# Patient Record
Sex: MALE | Race: WHITE | NOT HISPANIC OR LATINO
[De-identification: names, ages, dates, MRNs, and addresses within clinical notes are randomized per-mention and may not be internally consistent; named-entity substitution may affect disease eponyms.]

---

## 2014-07-01 RX ORDER — LISINOPRIL 2.5 MG/1
1 TABLET ORAL
Qty: 0 | Refills: 0 | COMMUNITY
Start: 2014-07-01

## 2014-07-01 RX ORDER — CARVEDILOL PHOSPHATE 80 MG/1
1 CAPSULE, EXTENDED RELEASE ORAL
Qty: 0 | Refills: 0 | DISCHARGE
Start: 2014-07-01

## 2014-07-01 RX ORDER — CARVEDILOL PHOSPHATE 80 MG/1
1.5 CAPSULE, EXTENDED RELEASE ORAL
Qty: 0 | Refills: 0 | DISCHARGE
Start: 2014-07-01

## 2014-07-01 RX ORDER — POTASSIUM CHLORIDE 20 MEQ
1 PACKET (EA) ORAL
Qty: 0 | Refills: 0 | COMMUNITY
Start: 2014-07-01

## 2017-01-09 ENCOUNTER — APPOINTMENT (OUTPATIENT)
Dept: VASCULAR SURGERY | Facility: CLINIC | Age: 82
End: 2017-01-09

## 2017-01-09 VITALS
RESPIRATION RATE: 16 BRPM | DIASTOLIC BLOOD PRESSURE: 63 MMHG | WEIGHT: 190 LBS | BODY MASS INDEX: 25.73 KG/M2 | HEIGHT: 72 IN | TEMPERATURE: 98.1 F | HEART RATE: 71 BPM | SYSTOLIC BLOOD PRESSURE: 138 MMHG

## 2017-01-09 DIAGNOSIS — I73.9 PERIPHERAL VASCULAR DISEASE, UNSPECIFIED: ICD-10-CM

## 2017-03-28 ENCOUNTER — APPOINTMENT (OUTPATIENT)
Dept: NUCLEAR MEDICINE | Facility: IMAGING CENTER | Age: 82
End: 2017-03-28

## 2017-03-28 ENCOUNTER — APPOINTMENT (OUTPATIENT)
Dept: CT IMAGING | Facility: IMAGING CENTER | Age: 82
End: 2017-03-28

## 2017-03-28 ENCOUNTER — OUTPATIENT (OUTPATIENT)
Dept: OUTPATIENT SERVICES | Facility: HOSPITAL | Age: 82
LOS: 1 days | End: 2017-03-28
Payer: COMMERCIAL

## 2017-03-28 DIAGNOSIS — Z90.49 ACQUIRED ABSENCE OF OTHER SPECIFIED PARTS OF DIGESTIVE TRACT: Chronic | ICD-10-CM

## 2017-03-28 DIAGNOSIS — C61 MALIGNANT NEOPLASM OF PROSTATE: ICD-10-CM

## 2017-03-28 DIAGNOSIS — Z98.89 OTHER SPECIFIED POSTPROCEDURAL STATES: Chronic | ICD-10-CM

## 2017-03-28 PROCEDURE — 78306 BONE IMAGING WHOLE BODY: CPT

## 2017-03-28 PROCEDURE — A9561: CPT

## 2017-03-28 PROCEDURE — 78999 UNLISTED MISC PX DX NUC MED: CPT

## 2017-03-28 PROCEDURE — 74176 CT ABD & PELVIS W/O CONTRAST: CPT

## 2017-04-10 ENCOUNTER — APPOINTMENT (OUTPATIENT)
Dept: VASCULAR SURGERY | Facility: CLINIC | Age: 82
End: 2017-04-10

## 2017-04-17 ENCOUNTER — APPOINTMENT (OUTPATIENT)
Dept: VASCULAR SURGERY | Facility: CLINIC | Age: 82
End: 2017-04-17

## 2017-05-15 ENCOUNTER — APPOINTMENT (OUTPATIENT)
Dept: VASCULAR SURGERY | Facility: CLINIC | Age: 82
End: 2017-05-15

## 2017-05-31 ENCOUNTER — INPATIENT (INPATIENT)
Facility: HOSPITAL | Age: 82
LOS: 1 days | Discharge: HOME | DRG: 690 | End: 2017-06-02
Attending: INTERNAL MEDICINE | Admitting: HOSPITALIST
Payer: COMMERCIAL

## 2017-05-31 VITALS
HEART RATE: 64 BPM | DIASTOLIC BLOOD PRESSURE: 63 MMHG | OXYGEN SATURATION: 99 % | TEMPERATURE: 98 F | WEIGHT: 195.11 LBS | SYSTOLIC BLOOD PRESSURE: 126 MMHG | RESPIRATION RATE: 16 BRPM

## 2017-05-31 DIAGNOSIS — I25.10 ATHEROSCLEROTIC HEART DISEASE OF NATIVE CORONARY ARTERY WITHOUT ANGINA PECTORIS: ICD-10-CM

## 2017-05-31 DIAGNOSIS — N39.0 URINARY TRACT INFECTION, SITE NOT SPECIFIED: ICD-10-CM

## 2017-05-31 DIAGNOSIS — R39.9 UNSPECIFIED SYMPTOMS AND SIGNS INVOLVING THE GENITOURINARY SYSTEM: ICD-10-CM

## 2017-05-31 DIAGNOSIS — I50.9 HEART FAILURE, UNSPECIFIED: ICD-10-CM

## 2017-05-31 DIAGNOSIS — G47.33 OBSTRUCTIVE SLEEP APNEA (ADULT) (PEDIATRIC): ICD-10-CM

## 2017-05-31 DIAGNOSIS — I73.9 PERIPHERAL VASCULAR DISEASE, UNSPECIFIED: ICD-10-CM

## 2017-05-31 DIAGNOSIS — I10 ESSENTIAL (PRIMARY) HYPERTENSION: ICD-10-CM

## 2017-05-31 DIAGNOSIS — R33.9 RETENTION OF URINE, UNSPECIFIED: ICD-10-CM

## 2017-05-31 DIAGNOSIS — Z98.89 OTHER SPECIFIED POSTPROCEDURAL STATES: Chronic | ICD-10-CM

## 2017-05-31 DIAGNOSIS — Z90.49 ACQUIRED ABSENCE OF OTHER SPECIFIED PARTS OF DIGESTIVE TRACT: Chronic | ICD-10-CM

## 2017-05-31 DIAGNOSIS — J44.9 CHRONIC OBSTRUCTIVE PULMONARY DISEASE, UNSPECIFIED: ICD-10-CM

## 2017-05-31 DIAGNOSIS — N13.30 UNSPECIFIED HYDRONEPHROSIS: ICD-10-CM

## 2017-05-31 DIAGNOSIS — I48.91 UNSPECIFIED ATRIAL FIBRILLATION: ICD-10-CM

## 2017-05-31 DIAGNOSIS — Z43.3 ENCOUNTER FOR ATTENTION TO COLOSTOMY: Chronic | ICD-10-CM

## 2017-05-31 DIAGNOSIS — N17.9 ACUTE KIDNEY FAILURE, UNSPECIFIED: ICD-10-CM

## 2017-05-31 DIAGNOSIS — C61 MALIGNANT NEOPLASM OF PROSTATE: ICD-10-CM

## 2017-05-31 DIAGNOSIS — Z29.9 ENCOUNTER FOR PROPHYLACTIC MEASURES, UNSPECIFIED: ICD-10-CM

## 2017-05-31 LAB
ALBUMIN SERPL ELPH-MCNC: 3.5 G/DL — SIGNIFICANT CHANGE UP (ref 3.3–5)
ALP SERPL-CCNC: 53 U/L — SIGNIFICANT CHANGE UP (ref 40–120)
ALT FLD-CCNC: 15 U/L — SIGNIFICANT CHANGE UP (ref 12–78)
ANION GAP SERPL CALC-SCNC: 9 MMOL/L — SIGNIFICANT CHANGE UP (ref 5–17)
APPEARANCE UR: CLEAR — SIGNIFICANT CHANGE UP
APTT BLD: 41.5 SEC — HIGH (ref 27.5–37.4)
AST SERPL-CCNC: 20 U/L — SIGNIFICANT CHANGE UP (ref 15–37)
BASOPHILS # BLD AUTO: 0.1 K/UL — SIGNIFICANT CHANGE UP (ref 0–0.2)
BASOPHILS NFR BLD AUTO: 1 % — SIGNIFICANT CHANGE UP (ref 0–2)
BILIRUB SERPL-MCNC: 0.5 MG/DL — SIGNIFICANT CHANGE UP (ref 0.2–1.2)
BILIRUB UR-MCNC: NEGATIVE — SIGNIFICANT CHANGE UP
BUN SERPL-MCNC: 18 MG/DL — SIGNIFICANT CHANGE UP (ref 7–23)
CALCIUM SERPL-MCNC: 9.7 MG/DL — SIGNIFICANT CHANGE UP (ref 8.5–10.1)
CHLORIDE SERPL-SCNC: 98 MMOL/L — SIGNIFICANT CHANGE UP (ref 96–108)
CO2 SERPL-SCNC: 28 MMOL/L — SIGNIFICANT CHANGE UP (ref 22–31)
COLOR SPEC: YELLOW — SIGNIFICANT CHANGE UP
CREAT SERPL-MCNC: 1.5 MG/DL — HIGH (ref 0.5–1.3)
DIFF PNL FLD: NEGATIVE — SIGNIFICANT CHANGE UP
EOSINOPHIL # BLD AUTO: 0.7 K/UL — HIGH (ref 0–0.5)
EOSINOPHIL NFR BLD AUTO: 7.5 % — HIGH (ref 0–6)
GLUCOSE SERPL-MCNC: 106 MG/DL — HIGH (ref 70–99)
GLUCOSE UR QL: NEGATIVE — SIGNIFICANT CHANGE UP
HCT VFR BLD CALC: 36.2 % — LOW (ref 39–50)
HGB BLD-MCNC: 11.5 G/DL — LOW (ref 13–17)
INR BLD: 1.87 RATIO — HIGH (ref 0.88–1.16)
KETONES UR-MCNC: NEGATIVE — SIGNIFICANT CHANGE UP
LACTATE SERPL-SCNC: 1.5 MMOL/L — SIGNIFICANT CHANGE UP (ref 0.7–2)
LEUKOCYTE ESTERASE UR-ACNC: NEGATIVE — SIGNIFICANT CHANGE UP
LIDOCAIN IGE QN: 147 U/L — SIGNIFICANT CHANGE UP (ref 73–393)
LYMPHOCYTES # BLD AUTO: 1.4 K/UL — SIGNIFICANT CHANGE UP (ref 1–3.3)
LYMPHOCYTES # BLD AUTO: 15.9 % — SIGNIFICANT CHANGE UP (ref 13–44)
MCHC RBC-ENTMCNC: 31.2 PG — SIGNIFICANT CHANGE UP (ref 27–34)
MCHC RBC-ENTMCNC: 31.8 GM/DL — LOW (ref 32–36)
MCV RBC AUTO: 98.1 FL — SIGNIFICANT CHANGE UP (ref 80–100)
MONOCYTES # BLD AUTO: 1 K/UL — HIGH (ref 0–0.9)
MONOCYTES NFR BLD AUTO: 11.2 % — HIGH (ref 1–9)
NEUTROPHILS # BLD AUTO: 5.8 K/UL — SIGNIFICANT CHANGE UP (ref 1.8–7.4)
NEUTROPHILS NFR BLD AUTO: 64.5 % — SIGNIFICANT CHANGE UP (ref 43–77)
NITRITE UR-MCNC: NEGATIVE — SIGNIFICANT CHANGE UP
PH UR: 5 — SIGNIFICANT CHANGE UP (ref 5–8)
PLATELET # BLD AUTO: 195 K/UL — SIGNIFICANT CHANGE UP (ref 150–400)
POTASSIUM SERPL-MCNC: 4.2 MMOL/L — SIGNIFICANT CHANGE UP (ref 3.5–5.3)
POTASSIUM SERPL-SCNC: 4.2 MMOL/L — SIGNIFICANT CHANGE UP (ref 3.5–5.3)
PROT SERPL-MCNC: 7 G/DL — SIGNIFICANT CHANGE UP (ref 6–8.3)
PROT UR-MCNC: NEGATIVE — SIGNIFICANT CHANGE UP
PROTHROM AB SERPL-ACNC: 20.6 SEC — HIGH (ref 9.8–12.7)
RBC # BLD: 3.69 M/UL — LOW (ref 4.2–5.8)
RBC # FLD: 14.9 % — HIGH (ref 10.3–14.5)
SODIUM SERPL-SCNC: 135 MMOL/L — SIGNIFICANT CHANGE UP (ref 135–145)
SP GR SPEC: 1 — LOW (ref 1.01–1.02)
UROBILINOGEN FLD QL: NEGATIVE — SIGNIFICANT CHANGE UP
WBC # BLD: 9 K/UL — SIGNIFICANT CHANGE UP (ref 3.8–10.5)
WBC # FLD AUTO: 9 K/UL — SIGNIFICANT CHANGE UP (ref 3.8–10.5)

## 2017-05-31 PROCEDURE — 99223 1ST HOSP IP/OBS HIGH 75: CPT | Mod: AI,GC

## 2017-05-31 PROCEDURE — 71010: CPT | Mod: 26

## 2017-05-31 PROCEDURE — 93010 ELECTROCARDIOGRAM REPORT: CPT

## 2017-05-31 PROCEDURE — 99285 EMERGENCY DEPT VISIT HI MDM: CPT

## 2017-05-31 RX ORDER — AZTREONAM 2 G
1000 VIAL (EA) INJECTION ONCE
Qty: 0 | Refills: 0 | Status: COMPLETED | OUTPATIENT
Start: 2017-05-31 | End: 2017-05-31

## 2017-05-31 RX ORDER — ALBUTEROL 90 UG/1
2 AEROSOL, METERED ORAL EVERY 6 HOURS
Qty: 0 | Refills: 0 | Status: DISCONTINUED | OUTPATIENT
Start: 2017-05-31 | End: 2017-06-02

## 2017-05-31 RX ORDER — SODIUM CHLORIDE 9 MG/ML
1000 INJECTION INTRAMUSCULAR; INTRAVENOUS; SUBCUTANEOUS
Qty: 0 | Refills: 0 | Status: DISCONTINUED | OUTPATIENT
Start: 2017-05-31 | End: 2017-06-01

## 2017-05-31 RX ORDER — TAMSULOSIN HYDROCHLORIDE 0.4 MG/1
0.4 CAPSULE ORAL AT BEDTIME
Qty: 0 | Refills: 0 | Status: DISCONTINUED | OUTPATIENT
Start: 2017-05-31 | End: 2017-06-02

## 2017-05-31 RX ORDER — MOMETASONE FUROATE 220 UG/1
1 INHALANT RESPIRATORY (INHALATION) DAILY
Qty: 0 | Refills: 0 | Status: DISCONTINUED | OUTPATIENT
Start: 2017-05-31 | End: 2017-06-02

## 2017-05-31 RX ORDER — GABAPENTIN 400 MG/1
100 CAPSULE ORAL THREE TIMES A DAY
Qty: 0 | Refills: 0 | Status: DISCONTINUED | OUTPATIENT
Start: 2017-05-31 | End: 2017-06-02

## 2017-05-31 RX ORDER — FENOFIBRATE,MICRONIZED 130 MG
12 CAPSULE ORAL DAILY
Qty: 0 | Refills: 0 | Status: DISCONTINUED | OUTPATIENT
Start: 2017-05-31 | End: 2017-06-02

## 2017-05-31 RX ORDER — POTASSIUM CHLORIDE 20 MEQ
10 PACKET (EA) ORAL DAILY
Qty: 0 | Refills: 0 | Status: DISCONTINUED | OUTPATIENT
Start: 2017-05-31 | End: 2017-06-01

## 2017-05-31 RX ORDER — TEMAZEPAM 15 MG/1
15 CAPSULE ORAL AT BEDTIME
Qty: 0 | Refills: 0 | Status: DISCONTINUED | OUTPATIENT
Start: 2017-05-31 | End: 2017-06-02

## 2017-05-31 RX ORDER — RIVAROXABAN 15 MG-20MG
15 KIT ORAL DAILY
Qty: 0 | Refills: 0 | Status: DISCONTINUED | OUTPATIENT
Start: 2017-05-31 | End: 2017-06-02

## 2017-05-31 RX ORDER — ASPIRIN/CALCIUM CARB/MAGNESIUM 324 MG
81 TABLET ORAL DAILY
Qty: 0 | Refills: 0 | Status: DISCONTINUED | OUTPATIENT
Start: 2017-05-31 | End: 2017-06-02

## 2017-05-31 RX ORDER — ATORVASTATIN CALCIUM 80 MG/1
20 TABLET, FILM COATED ORAL AT BEDTIME
Qty: 0 | Refills: 0 | Status: DISCONTINUED | OUTPATIENT
Start: 2017-05-31 | End: 2017-06-02

## 2017-05-31 RX ORDER — PANTOPRAZOLE SODIUM 20 MG/1
40 TABLET, DELAYED RELEASE ORAL
Qty: 0 | Refills: 0 | Status: DISCONTINUED | OUTPATIENT
Start: 2017-05-31 | End: 2017-06-02

## 2017-05-31 RX ORDER — LISINOPRIL 2.5 MG/1
10 TABLET ORAL DAILY
Qty: 0 | Refills: 0 | Status: DISCONTINUED | OUTPATIENT
Start: 2017-05-31 | End: 2017-06-02

## 2017-05-31 RX ORDER — CAPSAICIN 0.025 %
1 CREAM (GRAM) TOPICAL THREE TIMES A DAY
Qty: 0 | Refills: 0 | Status: DISCONTINUED | OUTPATIENT
Start: 2017-05-31 | End: 2017-06-02

## 2017-05-31 RX ORDER — TRAMADOL HYDROCHLORIDE 50 MG/1
50 TABLET ORAL DAILY
Qty: 0 | Refills: 0 | Status: DISCONTINUED | OUTPATIENT
Start: 2017-05-31 | End: 2017-06-02

## 2017-05-31 RX ORDER — CARVEDILOL PHOSPHATE 80 MG/1
12.5 CAPSULE, EXTENDED RELEASE ORAL EVERY 12 HOURS
Qty: 0 | Refills: 0 | Status: DISCONTINUED | OUTPATIENT
Start: 2017-05-31 | End: 2017-06-02

## 2017-05-31 RX ORDER — ISOSORBIDE MONONITRATE 60 MG/1
60 TABLET, EXTENDED RELEASE ORAL DAILY
Qty: 0 | Refills: 0 | Status: DISCONTINUED | OUTPATIENT
Start: 2017-05-31 | End: 2017-06-02

## 2017-05-31 RX ADMIN — TRAMADOL HYDROCHLORIDE 50 MILLIGRAM(S): 50 TABLET ORAL at 23:20

## 2017-05-31 RX ADMIN — ATORVASTATIN CALCIUM 20 MILLIGRAM(S): 80 TABLET, FILM COATED ORAL at 22:44

## 2017-05-31 RX ADMIN — GABAPENTIN 100 MILLIGRAM(S): 400 CAPSULE ORAL at 22:44

## 2017-05-31 RX ADMIN — TAMSULOSIN HYDROCHLORIDE 0.4 MILLIGRAM(S): 0.4 CAPSULE ORAL at 22:44

## 2017-05-31 RX ADMIN — SODIUM CHLORIDE 50 MILLILITER(S): 9 INJECTION INTRAMUSCULAR; INTRAVENOUS; SUBCUTANEOUS at 21:00

## 2017-05-31 RX ADMIN — Medication 50 MILLIGRAM(S): at 14:32

## 2017-05-31 RX ADMIN — TRAMADOL HYDROCHLORIDE 50 MILLIGRAM(S): 50 TABLET ORAL at 22:44

## 2017-05-31 RX ADMIN — CARVEDILOL PHOSPHATE 12.5 MILLIGRAM(S): 80 CAPSULE, EXTENDED RELEASE ORAL at 18:11

## 2017-05-31 NOTE — ED PROVIDER NOTE - PMH
Acute MI    Afib    Afib    Anemia, vitamin B12 deficiency    Automatic implantable cardioverter-defibrillator in situ    CAD (coronary artery disease)    CKD (chronic kidney disease)    Colon cancer    Colon cancer    Congestive heart failure    COPD (chronic obstructive pulmonary disease)    HTN (hypertension)    HTN (hypertension)    Hypertension    Insomnia    Ischemic colitis, enteritis, or enterocolitis    MI (myocardial infarction)    PAD (peripheral artery disease)    Peripheral Neuropathy    Prostate cancer    Vitamin B 12 deficiency

## 2017-05-31 NOTE — CONSULT NOTE ADULT - PROBLEM SELECTOR RECOMMENDATION 3
on self catheterization 4x/day.  Neville placed due to hydro and difficulty for patient to perform self cath in the hospital

## 2017-05-31 NOTE — H&P ADULT - PROBLEM SELECTOR PLAN 9
S/P radiation.   Continue with daily self-catheterization S/P radiation.   Continue with daily self-catheterization  Continue Tamsulosin

## 2017-05-31 NOTE — H&P ADULT - NSHPREVIEWOFSYSTEMS_GEN_ALL_CORE
REVIEW OF SYSTEMS:  CONSTITUTIONAL: No fever, weight loss, or fatigue  EYES: No eye pain, visual disturbances, or discharge  ENMT:  No difficulty hearing, tinnitus, vertigo; No sinus or throat pain  NECK: No pain or stiffness  RESPIRATORY: No cough, wheezing, chills or hemoptysis; No shortness of breath  CARDIOVASCULAR: No chest pain, palpitations, dizziness, or leg swelling  GASTROINTESTINAL: No abdominal or epigastric pain. No nausea, vomiting, or hematemesis; No diarrhea or constipation. No melena or hematochezia.  GENITOURINARY: No frequency, hematuria. Admits to dysuria and urinary retention  NEUROLOGICAL: No headaches, memory loss, loss of strength, numbness, or tremors  SKIN: No itching, burning, rashes, or lesions   LYMPH NODES: No enlarged glands  ENDOCRINE: No heat or cold intolerance; No hair loss  MUSCULOSKELETAL: No joint pain or swelling; chronic LE neuropathy  PSYCHIATRIC: No depression, anxiety, mood swings, or difficulty sleeping  HEME/LYMPH: No easy bruising, or bleeding gums  ALLERGY AND IMMUNOLOGIC: No hives or eczema

## 2017-05-31 NOTE — H&P ADULT - HISTORY OF PRESENT ILLNESS
82 yo M with PMHx of recurrent UTIs, CHF, MI s/p CABG, prostate CA s/p radiation, colon CA s/p chemo and resection with colostomy, HTN, HLD, A-fib on Xarelto, TIA, PVD, AICD presented to the ED with MDR UTI with culture performed outpatient with Dr. Remy. The patient has a history of recurrent UTIs and follows with urology, Dr. Remy. The patient had taken a course of Keflex and Cipro a few weeks ago. Yesterday, the patient was prescribed Bactrim, but he did not take any of the medication. Patient admits to burning with urination in the morning. Denies increased urinary frequency, gross hematuria, abdominal pain, N/V/D/C, fever, chills. The patient self catheterizes 4 times per day. 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 Remy. The patient has a history of recurrent UTIs and follows with urology, Dr. Remy. The patient had taken a course of Keflex and Cipro a few weeks ago. Yesterday, the patient was prescribed Bactrim, but he did not take any of the medication. Patient admits to burning with urination in the morning. Denies increased urinary frequency, gross hematuria, abdominal pain, N/V/D/C, fever, chills. The patient self catheterizes 4 times per day s/p prostate cancer tx.      In the ED, T 98.1, HR 64, /63, RR 16, SPO2 99% on RA. WBC 9, Hgb 11.5, Hct 36.2, platelets 195, PT 20.6, INR 1.87, PTT 41.5, Cr 1.5, Glucose 106, lactate 1.5, lipase 147, UA: neg  CXR: Cardiomegaly. No active infiltrates. Small right pleural effusion. 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 Remy. The patient has a history of recurrent UTIs and follows with urology, Dr. Remy. The patient had taken a course of Keflex and Cipro a few weeks ago. Yesterday, the patient was prescribed Bactrim, but he did not take any of the medication. Patient admits to burning with urination in the morning. Denies increased urinary frequency, gross hematuria, abdominal pain, N/V/D/C, fever, chills. The patient self catheterizes 4 times per day s/p prostate cancer tx.      In the ED, T 98.1, HR 64, /63, RR 16, SPO2 99% on RA. WBC 9, Hgb 11.5, Hct 36.2, platelets 195, PT 20.6, INR 1.87, PTT 41.5, Cr 1.5, Glucose 106, lactate 1.5, lipase 147, UA: neg  CXR: Cardiomegaly. No active infiltrates. Small right pleural effusion.  Outpatient UCx: Pseudomonas aeruginosa >100,000 cfu/ml, Elizabethkingia meningoseptica >100,000 cfu/ml

## 2017-05-31 NOTE — ED ADULT NURSE REASSESSMENT NOTE - NS ED NURSE REASSESS COMMENT FT1
Received patient from Noemi PERSON. Patient alert and oriented. Vital signs as documented. Patient denies pain. Awaiting to give report to floor and transport. Safety maintained.

## 2017-05-31 NOTE — ED ADULT NURSE REASSESSMENT NOTE - NS ED NURSE REASSESS COMMENT FT1
Pt states he has COPD and is oxygen dependent at home at hour of sleep. Call placed to resident to obtain orders for oxygen.  Spo2 here in ER 98% on RA.

## 2017-05-31 NOTE — H&P ADULT - NSHPOUTPATIENTPROVIDERS_GEN_ALL_CORE
PMD: Dr. Del Castillo  Urology: Dr. Remy  Cardiology: Dr. Moore  Podiatrist: Dr. Sumner  Vascular: Dr. Rojas

## 2017-05-31 NOTE — H&P ADULT - PROBLEM SELECTOR PLAN 4
S/P AICD  Continue home meds: torsemide and lisinopril with hold parameters  Strict I+Os  Daily weights

## 2017-05-31 NOTE — PROCEDURE NOTE - NSURITECHNIQUE_GU_A_CORE
The collection bag is below the level of the patient and urinary bladder/Sterile gloves were worn for the duration of the procedure/The urinary drainage system is closed at the end of the procedure/The site was cleaned with soap/water and sterile solution (betadine)/The catheter was secured with a securement device (e.g. StatLock)/All applicable medical record documentation is completed/A sterile drape was used to cover all adjacent areas/Proper hand hygiene was performed

## 2017-05-31 NOTE — H&P ADULT - FAMILY HISTORY
Father  Still living? No  Family history of MI (myocardial infarction), Age at diagnosis: Age Unknown  Family history of colon cancer, Age at diagnosis: Age Unknown

## 2017-05-31 NOTE — ED PROVIDER NOTE - OBJECTIVE STATEMENT
82 yo male c/o frequency/dysuria x 2 weeks, has been on cipro x 1 week and bactrim x 1 week.  No fever/chills.  No back pain.  Sent from [facility] center Dr. Remy (PMD) for +urine cultures showing multi drug resistance (>100K Pseudomonas and >100K Elizabethkingia meningoseptica).  Sent here for IV abx, PICC line/admission.

## 2017-05-31 NOTE — H&P ADULT - NSHPSOCIALHISTORY_GEN_ALL_CORE
Social History:    Marital Status:  ( X )    (   ) Single    (   )    (  )   Lives with: (  ) alone  (  ) children   ( X ) spouse   (  ) parents  (  ) other    Substance Use (street drugs): ( X ) never used  (  ) other:  Tobacco Usage:  ( X  ) never smoked   (   ) former smoker   (   ) current smoker; quit 50 years ago, smoked 1 ppd/15 years    Health Management  Ambulates with walker and cane.    Immunization Hx:   ( X ) flu shot                               (     ) date   ( X ) pneumonia shot               (     ) date  ( X ) tetanus                               (     ) date

## 2017-05-31 NOTE — H&P ADULT - ATTENDING COMMENTS
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 Remy. Plan as above discussed at length.

## 2017-05-31 NOTE — H&P ADULT - RS GEN PE MLT RESP DETAILS PC
no rhonchi/no rales/clear to auscultation bilaterally/breath sounds equal/good air movement/no chest wall tenderness/airway patent/respirations non-labored/no wheezes

## 2017-05-31 NOTE — PROCEDURE NOTE - NSSITEPREP_SKIN_A_CORE
Adherence to aseptic technique: hand hygiene prior to donning barriers (gown, gloves), don cap and mask, sterile drape over patient/povidone iodine (if allergic to chlorhexidine)

## 2017-05-31 NOTE — ED ADULT NURSE NOTE - PMH
Acute MI    Afib    Afib    Anemia, vitamin B12 deficiency    Automatic implantable cardioverter-defibrillator in situ    CAD (coronary artery disease)    CKD (chronic kidney disease)    Colon cancer    Colon cancer    Congestive heart failure    COPD (chronic obstructive pulmonary disease)    HTN (hypertension)    HTN (hypertension)    Hypertension    Insomnia    Ischemic colitis, enteritis, or enterocolitis    MI (myocardial infarction)    PAD (peripheral artery disease)    Peripheral Neuropathy    Prostate cancer    Vitamin B 12 deficiency Acute MI    Afib    Afib    Anemia, vitamin B12 deficiency    Automatic implantable cardioverter-defibrillator in situ    CAD (coronary artery disease)    CKD (chronic kidney disease)    Colon cancer    Colon cancer    Congestive heart failure    COPD (chronic obstructive pulmonary disease)    HTN (hypertension)    HTN (hypertension)    Hypertension    Insomnia    Ischemic bowel disease    Ischemic colitis, enteritis, or enterocolitis    MI (myocardial infarction)    PAD (peripheral artery disease)    Peripheral Neuropathy    Prostate cancer    Vitamin B 12 deficiency Acute MI    Afib    Anemia, vitamin B12 deficiency    Automatic implantable cardioverter-defibrillator in situ    CAD (coronary artery disease)    CKD (chronic kidney disease)    Colon cancer    Congestive heart failure    COPD (chronic obstructive pulmonary disease)    COPD (chronic obstructive pulmonary disease)    HTN (hypertension)    Hypertension    Insomnia    Ischemic bowel disease    Ischemic colitis, enteritis, or enterocolitis    MI (myocardial infarction)    Oxygen dependent  wears 2LNC at HS  PAD (peripheral artery disease)    Peripheral Neuropathy    Prostate cancer    Vitamin B 12 deficiency

## 2017-05-31 NOTE — H&P ADULT - PROBLEM SELECTOR PLAN 1
Admit to [facility]  Consult ID- Dr. Parrish  Consult urology- Dr. Everett  Start IV antibiotics as per ID  Continue urinary cath 4x/day  Continue Flomax  Monitor vitals and AM labs  Follow up repeat BCx, UA/UCx and sensitivity Admit to [facility]  Outpatient UCx: Pseudomonas aeruginosa >100,000 cfu/ml, Elizabethkingia meningoseptica >100,000 cfu/ml  Consult ID- Dr. Parrish  Consult urology- Dr. Everett  Start IV antibiotics as per ID  Continue urinary cath 4x/day  Continue Flomax  Monitor vitals and AM labs  Follow up repeat BCx, UA/UCx and sensitivity

## 2017-05-31 NOTE — ED ADULT TRIAGE NOTE - CHIEF COMPLAINT QUOTE
c/o persistent burning feeling during urination. s/e by Urologist diagnosed with UTI with positive urine culture.

## 2017-05-31 NOTE — H&P ADULT - ASSESSMENT
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 Remy. 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 Remy.

## 2017-05-31 NOTE — H&P ADULT - PMH
Acute MI    Afib    Anemia, vitamin B12 deficiency    Automatic implantable cardioverter-defibrillator in situ    CAD (coronary artery disease)    CKD (chronic kidney disease)    Colon cancer    Congestive heart failure    COPD (chronic obstructive pulmonary disease)    HTN (hypertension)    Hypertension    Insomnia    Ischemic bowel disease    Ischemic colitis, enteritis, or enterocolitis    MI (myocardial infarction)    PAD (peripheral artery disease)    Peripheral Neuropathy    Prostate cancer    Vitamin B 12 deficiency

## 2017-05-31 NOTE — ED ADULT NURSE NOTE - OBJECTIVE STATEMENT
Presents to ER w c/o burning urination.  Pt states he was on Cipro a couple of weeks ago for a UTI and states "antibiotics didn't work so my doctor put me on Bactrim yesterday, but I didn't take it b/c I just figured i'd come to the hospital today" Pt report straight cathing himself 4x's per day.  Stage 1 noted to sacrum.

## 2017-05-31 NOTE — ED PROVIDER NOTE - PSH
Cataract extraction status of eye, right    S/P amputation  1-3 digits of Right foot  S/P bypass graft of extremity  Left to right femoral-femoral artery bypass graft 10 years ago  S/P CABG x 3    S/P cardiac pacemaker procedure  AICD  S/P cholecystectomy    S/P cholecystectomy    S/P colon resection    S/P colon resection    S/P colon resection    S/P colostomy    S/P colostomy    S/P small bowel resection

## 2017-05-31 NOTE — H&P ADULT - PSH
Cataract extraction status of eye, right    Colostomy care    S/P amputation  1-3 digits of Right foot  S/P bypass graft of extremity  Left to right femoral-femoral artery bypass graft 10 years ago  S/P CABG x 3    S/P cardiac pacemaker procedure  AICD  S/P cholecystectomy    S/P colon resection    S/P colostomy    S/P small bowel resection

## 2017-06-01 LAB
ALBUMIN SERPL ELPH-MCNC: 3.2 G/DL — LOW (ref 3.3–5)
ALP SERPL-CCNC: 48 U/L — SIGNIFICANT CHANGE UP (ref 40–120)
ALT FLD-CCNC: 11 U/L — LOW (ref 12–78)
ANION GAP SERPL CALC-SCNC: 7 MMOL/L — SIGNIFICANT CHANGE UP (ref 5–17)
AST SERPL-CCNC: 16 U/L — SIGNIFICANT CHANGE UP (ref 15–37)
BASOPHILS # BLD AUTO: 0.1 K/UL — SIGNIFICANT CHANGE UP (ref 0–0.2)
BASOPHILS NFR BLD AUTO: 0.8 % — SIGNIFICANT CHANGE UP (ref 0–2)
BILIRUB SERPL-MCNC: 0.7 MG/DL — SIGNIFICANT CHANGE UP (ref 0.2–1.2)
BUN SERPL-MCNC: 17 MG/DL — SIGNIFICANT CHANGE UP (ref 7–23)
CALCIUM SERPL-MCNC: 9.6 MG/DL — SIGNIFICANT CHANGE UP (ref 8.5–10.1)
CHLORIDE SERPL-SCNC: 100 MMOL/L — SIGNIFICANT CHANGE UP (ref 96–108)
CO2 SERPL-SCNC: 30 MMOL/L — SIGNIFICANT CHANGE UP (ref 22–31)
CREAT SERPL-MCNC: 1.4 MG/DL — HIGH (ref 0.5–1.3)
CULTURE RESULTS: NO GROWTH — SIGNIFICANT CHANGE UP
EOSINOPHIL # BLD AUTO: 0.6 K/UL — HIGH (ref 0–0.5)
EOSINOPHIL NFR BLD AUTO: 6.9 % — HIGH (ref 0–6)
GLUCOSE SERPL-MCNC: 83 MG/DL — SIGNIFICANT CHANGE UP (ref 70–99)
HCT VFR BLD CALC: 34.4 % — LOW (ref 39–50)
HGB BLD-MCNC: 10.8 G/DL — LOW (ref 13–17)
LYMPHOCYTES # BLD AUTO: 1.4 K/UL — SIGNIFICANT CHANGE UP (ref 1–3.3)
LYMPHOCYTES # BLD AUTO: 15.1 % — SIGNIFICANT CHANGE UP (ref 13–44)
MCHC RBC-ENTMCNC: 30.7 PG — SIGNIFICANT CHANGE UP (ref 27–34)
MCHC RBC-ENTMCNC: 31.4 GM/DL — LOW (ref 32–36)
MCV RBC AUTO: 97.7 FL — SIGNIFICANT CHANGE UP (ref 80–100)
MONOCYTES # BLD AUTO: 1.2 K/UL — HIGH (ref 0–0.9)
MONOCYTES NFR BLD AUTO: 12.9 % — HIGH (ref 1–9)
NEUTROPHILS # BLD AUTO: 5.9 K/UL — SIGNIFICANT CHANGE UP (ref 1.8–7.4)
NEUTROPHILS NFR BLD AUTO: 64.3 % — SIGNIFICANT CHANGE UP (ref 43–77)
PLATELET # BLD AUTO: 194 K/UL — SIGNIFICANT CHANGE UP (ref 150–400)
POTASSIUM SERPL-MCNC: 4.4 MMOL/L — SIGNIFICANT CHANGE UP (ref 3.5–5.3)
POTASSIUM SERPL-SCNC: 4.4 MMOL/L — SIGNIFICANT CHANGE UP (ref 3.5–5.3)
PROT SERPL-MCNC: 6.4 G/DL — SIGNIFICANT CHANGE UP (ref 6–8.3)
RBC # BLD: 3.52 M/UL — LOW (ref 4.2–5.8)
RBC # FLD: 14.9 % — HIGH (ref 10.3–14.5)
SODIUM SERPL-SCNC: 137 MMOL/L — SIGNIFICANT CHANGE UP (ref 135–145)
SPECIMEN SOURCE: SIGNIFICANT CHANGE UP
WBC # BLD: 9.2 K/UL — SIGNIFICANT CHANGE UP (ref 3.8–10.5)
WBC # FLD AUTO: 9.2 K/UL — SIGNIFICANT CHANGE UP (ref 3.8–10.5)

## 2017-06-01 PROCEDURE — 99233 SBSQ HOSP IP/OBS HIGH 50: CPT | Mod: GC

## 2017-06-01 RX ORDER — ACETAMINOPHEN 500 MG
650 TABLET ORAL EVERY 6 HOURS
Qty: 0 | Refills: 0 | Status: DISCONTINUED | OUTPATIENT
Start: 2017-06-01 | End: 2017-06-02

## 2017-06-01 RX ORDER — GLYCERIN 1 %
1 DROPS OPHTHALMIC (EYE)
Qty: 0 | Refills: 0 | Status: DISCONTINUED | OUTPATIENT
Start: 2017-06-01 | End: 2017-06-01

## 2017-06-01 RX ADMIN — ISOSORBIDE MONONITRATE 60 MILLIGRAM(S): 60 TABLET, EXTENDED RELEASE ORAL at 13:25

## 2017-06-01 RX ADMIN — Medication 81 MILLIGRAM(S): at 13:25

## 2017-06-01 RX ADMIN — Medication 12 MILLIGRAM(S): at 13:25

## 2017-06-01 RX ADMIN — ATORVASTATIN CALCIUM 20 MILLIGRAM(S): 80 TABLET, FILM COATED ORAL at 22:02

## 2017-06-01 RX ADMIN — Medication 1 DROP(S): at 18:00

## 2017-06-01 RX ADMIN — PANTOPRAZOLE SODIUM 40 MILLIGRAM(S): 20 TABLET, DELAYED RELEASE ORAL at 06:36

## 2017-06-01 RX ADMIN — TAMSULOSIN HYDROCHLORIDE 0.4 MILLIGRAM(S): 0.4 CAPSULE ORAL at 22:02

## 2017-06-01 RX ADMIN — LISINOPRIL 10 MILLIGRAM(S): 2.5 TABLET ORAL at 06:36

## 2017-06-01 RX ADMIN — Medication 650 MILLIGRAM(S): at 10:54

## 2017-06-01 RX ADMIN — Medication 10 MILLIEQUIVALENT(S): at 13:24

## 2017-06-01 RX ADMIN — CARVEDILOL PHOSPHATE 12.5 MILLIGRAM(S): 80 CAPSULE, EXTENDED RELEASE ORAL at 19:52

## 2017-06-01 RX ADMIN — Medication 650 MILLIGRAM(S): at 11:00

## 2017-06-01 RX ADMIN — MOMETASONE FUROATE 1 PUFF(S): 220 INHALANT RESPIRATORY (INHALATION) at 18:00

## 2017-06-01 RX ADMIN — CARVEDILOL PHOSPHATE 12.5 MILLIGRAM(S): 80 CAPSULE, EXTENDED RELEASE ORAL at 06:36

## 2017-06-01 RX ADMIN — TEMAZEPAM 15 MILLIGRAM(S): 15 CAPSULE ORAL at 22:08

## 2017-06-01 RX ADMIN — GABAPENTIN 100 MILLIGRAM(S): 400 CAPSULE ORAL at 22:02

## 2017-06-01 RX ADMIN — GABAPENTIN 100 MILLIGRAM(S): 400 CAPSULE ORAL at 13:25

## 2017-06-01 RX ADMIN — RIVAROXABAN 15 MILLIGRAM(S): KIT at 19:52

## 2017-06-01 RX ADMIN — TRAMADOL HYDROCHLORIDE 50 MILLIGRAM(S): 50 TABLET ORAL at 22:02

## 2017-06-01 RX ADMIN — GABAPENTIN 100 MILLIGRAM(S): 400 CAPSULE ORAL at 06:36

## 2017-06-01 NOTE — PROGRESS NOTE ADULT - PROBLEM SELECTOR PLAN 2
likely CKD, creatinine slightly improved to 1.4 today  Start IVF hydration, NS 50 cc/hr for 12 hrs likely CKD, creatinine slightly improved to 1.4 today which appears to be the baseline based on records  hold off on further IVF

## 2017-06-01 NOTE — PROGRESS NOTE ADULT - SUBJECTIVE AND OBJECTIVE BOX
PAST MEDICAL & SURGICAL HISTORY:  Oxygen dependent: wears 2LNC at HS  COPD (chronic obstructive pulmonary disease)  Ischemic bowel disease  Colon cancer  Automatic implantable cardioverter-defibrillator in situ  Acute MI  Afib  Hypertension  Congestive heart failure  Insomnia  Anemia, vitamin B12 deficiency  CAD (coronary artery disease)  HTN (hypertension)  Ischemic colitis, enteritis, or enterocolitis  Vitamin B 12 deficiency  Prostate cancer  Colon cancer  Afib  CKD (chronic kidney disease)  MI (myocardial infarction)  PAD (peripheral artery disease)  CAD (coronary artery disease)  Peripheral Neuropathy  HTN (hypertension)  COPD (chronic obstructive pulmonary disease)  Colostomy care  Cataract extraction status of eye, right  S/P colostomy  S/P colon resection  S/P cholecystectomy  S/P colostomy  S/P colon resection  S/P cardiac pacemaker procedure: AICD  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  S/P small bowel resection  S/P colon resection  S/P cholecystectomy  S/P bypass graft of extremity: Left to right femoral-femoral artery bypass graft 10 years ago  S/P CABG x 3      REVIEW OF SYSTEMS:    MEDICATIONS  (STANDING):  aspirin enteric coated 81milliGRAM(s) Oral daily  traMADol 50milliGRAM(s) Oral daily  lisinopril 10milliGRAM(s) Oral daily  tamsulosin 0.4milliGRAM(s) Oral at bedtime  isosorbide   mononitrate ER Tablet (IMDUR) 60milliGRAM(s) Oral daily  rivaroxaban 15milliGRAM(s) Oral daily  gabapentin 100milliGRAM(s) Oral three times a day  atorvastatin 20milliGRAM(s) Oral at bedtime  fenofibrate Tablet 12milliGRAM(s) Oral daily  carvedilol 12.5milliGRAM(s) Oral every 12 hours  pantoprazole    Tablet 40milliGRAM(s) Oral before breakfast  mometasone 220 MICROgram(s) Inhaler 1Puff(s) Inhalation daily  artificial  tears Solution 1Drop(s) Both EYES every 12 hours  torsemide 20milliGRAM(s) Oral daily    MEDICATIONS  (PRN):  temazepam 15milliGRAM(s) Oral at bedtime PRN Insomnia  capsaicin 0.025% Cream 1Application(s) Topical three times a day PRN Pain  ALBUTerol    90 MICROgram(s) HFA Inhaler 2Puff(s) Inhalation every 6 hours PRN Shortness of Breath and/or Wheezing  acetaminophen   Tablet. 650milliGRAM(s) Oral every 6 hours PRN Mild Pain (1 - 3)      PE: Neville per urethra, urine grossly clear    Allergies    No Known Allergies    Intolerances        FAMILY HISTORY:  Family history of colon cancer (Father)  Family history of MI (myocardial infarction) (Father)  No pertinent family history in first degree relatives      Vital Signs Last 24 Hrs  T(C): 36.8, Max: 36.9 ( @ 19:30)  T(F): 98.2, Max: 98.4 ( @ 19:30)  HR: 59 (59 - 72)  BP: 111/65 (111/65 - 148/80)  BP(mean): --  RR: 59 (15 - 59)  SpO2: 95% (95% - 99%)    PHYSICAL EXAM:    LABS:                        10.8   9.2   )-----------( 194      ( 2017 09:00 )             34.4     06-01    137  |  100  |  17  ----------------------------<  83  4.4   |  30  |  1.40<H>    Ca    9.6      2017 09:00    TPro  6.4  /  Alb  3.2<L>  /  TBili  0.7  /  DBili  x   /  AST  16  /  ALT  11<L>  /  AlkPhos  48  06-01    PT/INR - ( 31 May 2017 13:29 )   PT: 20.6 sec;   INR: 1.87 ratio         PTT - ( 31 May 2017 13:29 )  PTT:41.5 sec  Urinalysis Basic - ( 31 May 2017 14:32 )    Color: Yellow / Appearance: Clear / S.005 / pH: x  Gluc: x / Ketone: Negative  / Bili: Negative / Urobili: Negative   Blood: x / Protein: Negative / Nitrite: Negative   Leuk Esterase: Negative / RBC: x / WBC x   Sq Epi: x / Non Sq Epi: x / Bacteria: x      Urine Culture:     RADIOLOGY & ADDITIONAL STUDIES:

## 2017-06-01 NOTE — DISCHARGE NOTE ADULT - PATIENT PORTAL LINK FT
“You can access the [facility] Patient Portal, offered by [facility], by registering with the following website: [email or web address]”

## 2017-06-01 NOTE — PROGRESS NOTE ADULT - PROBLEM SELECTOR PLAN 7
Continue lisinopril, carvedilol, torsemide and KCl  Monitor vitals Continue lisinopril, carvedilol, torsemide wit hold parameters   Monitor vitals

## 2017-06-01 NOTE — DISCHARGE NOTE ADULT - HOSPITAL COURSE
84 yo M with PMHx of recurrent UTIs, CHF (LVEF 30% in Aug 2016), MI s/p CABG, AICD, A-fib on Xarelto,  prostate CA s/p radiation (2004), colon CA s/p chemo (2004) and resection with colostomy, HTN, HLD, TIA, PVD s/p right foot amputation of toes 1-3, COPD and left hip fx presented to the ED with MDR UTI with culture performed outpatient with Dr. Remy. The patient has a history of recurrent UTIs and follows with urology, Dr. Remy. The patient had taken a course of Keflex and Cipro a few weeks prior to admission. Day before admission, the patient was prescribed Bactrim, but he did not take any of the medication. Patient admitted to burning with urination in the morning. Denied increased urinary frequency, gross hematuria, abdominal pain, N/V/D/C, fever, chills. The patient self catheterizes 4 times per day s/p prostate cancer tx.      In the ED, T 98.1, HR 64, /63, RR 16, SPO2 99% on RA. WBC 9, Hgb 11.5, Hct 36.2, platelets 195, PT 20.6, INR 1.87, PTT 41.5, Cr 1.5, Glucose 106, lactate 1.5, lipase 147, UA: neg. CXR: Cardiomegaly. No active infiltrates. Small right pleural effusion. Outpatient UCx: Pseudomonas aeruginosa >100,000 cfu/ml, Elizabethkingia meningoseptica >100,000 cfu/ml.    Admitted to general medical floor with UTI, positive outpatient blood cultures, continued 82 yo M with PMHx of recurrent UTIs, CHF (LVEF 30% in Aug 2016), MI s/p CABG, AICD, A-fib on Xarelto,  prostate CA s/p radiation (2004), colon CA s/p chemo (2004) and resection with colostomy, HTN, HLD, TIA, PVD s/p right foot amputation of toes 1-3, COPD and left hip fx presented to the ED with MDR UTI with culture performed outpatient with Dr. Remy. The patient has a history of recurrent UTIs and follows with urology, Dr. Remy. The patient had taken a course of Keflex and Cipro a few weeks prior to admission. Day before admission, the patient was prescribed Bactrim, but he did not take any of the medication. Patient admitted to burning with urination in the morning. Denied increased urinary frequency, gross hematuria, abdominal pain, N/V/D/C, fever, chills. The patient self catheterizes 4 times per day s/p prostate cancer tx.      In the ED, T 98.1, HR 64, /63, RR 16, SPO2 99% on RA. WBC 9, Hgb 11.5, Hct 36.2, platelets 195, PT 20.6, INR 1.87, PTT 41.5, Cr 1.5, Glucose 106, lactate 1.5, lipase 147, UA: neg. CXR: Cardiomegaly. No active infiltrates. Small right pleural effusion. Outpatient UCx: Pseudomonas aeruginosa >100,000 cfu/ml, Elizabethkingia meningoseptica >100,000 cfu/ml.    Admitted to general medical floor with UTI, positive outpatient blood cultures, given one dose of azactam in ED. Evaluated by urology (Dr Be) who inserted alvarado catheter in ED. Per  and ID, likely not acute UTI, antibiotics not continued. Renal ultrasound done showing.... 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 Remy. The patient has a history of recurrent UTIs and follows with urology, Dr. Remy. The patient had taken a course of Keflex and Cipro a few weeks prior to admission. Day before admission, the patient was prescribed Bactrim, but he did not take any of the medication. Patient admitted to burning with urination in the morning. Denied increased urinary frequency, gross hematuria, abdominal pain, N/V/D/C, fever, chills. The patient self catheterizes 4 times per day s/p prostate cancer tx.      In the ED, T 98.1, HR 64, /63, RR 16, SPO2 99% on RA. WBC 9, Hgb 11.5, Hct 36.2, platelets 195, PT 20.6, INR 1.87, PTT 41.5, Cr 1.5, Glucose 106, lactate 1.5, lipase 147, UA: neg. CXR: Cardiomegaly. No active infiltrates. Small right pleural effusion. Outpatient UCx: Pseudomonas aeruginosa >100,000 cfu/ml, Elizabethkingia meningoseptica >100,000 cfu/ml.    Admitted to general medical floor with UTI, positive outpatient blood cultures, given one dose of azactam in ED. Evaluated by urology (Dr Be) who inserted alvarado catheter in ED. Per  and ID, likely not acute UTI, antibiotics not continued. Left eye erythematous with some blood pooling, patient states this happens to him intermittently at home and he suffers from dry eyes. He was given wetting eye drops, no acute changes in vision or extraocular movements. Renal ultrasound done showing no hydronephrosis. Per patient, he is at his baseline creatinine (ranges 1.4-1.6). Alvarado discontinued, patient may continue CIC at home. Patient stable for discharge home, follow up with urology (Dr Remy) in 2 weeks and PCP (Dr Del Castillo) upon discharge. 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 Remy. The patient has a history of recurrent UTIs and follows with urology, Dr. Remy. The patient had taken a course of Keflex and Cipro a few weeks prior to admission. Day before admission, the patient was prescribed Bactrim, but he did not take any of the medication. Patient admitted to burning with urination in the morning. Denied increased urinary frequency, gross hematuria, abdominal pain, N/V/D/C, fever, chills. The patient self catheterizes 4 times per day s/p prostate cancer tx.      In the ED, T 98.1, HR 64, /63, RR 16, SPO2 99% on RA. WBC 9, Hgb 11.5, Hct 36.2, platelets 195, PT 20.6, INR 1.87, PTT 41.5, Cr 1.5, Glucose 106, lactate 1.5, lipase 147, UA: neg. CXR: Cardiomegaly. No active infiltrates. Small right pleural effusion. Outpatient UCx: Pseudomonas aeruginosa >100,000 cfu/ml, Elizabethkingia meningoseptica >100,000 cfu/ml.    Admitted to general medical floor with UTI, positive outpatient blood cultures, given one dose of azactam in ED. Evaluated by urology (Dr Be) who inserted alvarado catheter in ED. Per  and ID, likely not acute UTI, antibiotics not continued. Left eye erythematous with some blood pooling, patient states this happens to him intermittently at home and he suffers from dry eyes. He was given wetting eye drops, no acute changes in vision or extraocular movements. Renal ultrasound done showing no hydronephrosis. Per patient, he is at his baseline creatinine (ranges 1.4-1.6). Alvarado discontinued, patient may continue CIC at home. Patient stable for discharge home, follow up with urology (Dr Remy) in 2 weeks and PCP (Dr Del Castillo) upon discharge.    PE on day of discharge:  Vitals- T: 36.4, HR: 60, BP: 121/63, RR: 17, SpO2: 97% on RA  Gen- no acute distress, resting comfortably in bed  HEENT- left eye erythematous with slight blood pooling, pupils reactive, EOMI, no surrounding tenderness  Neuro- awake alert and oriented x3, CN II-XII intact  Cardio- regular rate and rhythm, (+) S1/S2  Resp- clear to auscultation bilaterally, no wheezes/rales/rhonchi  Abd- soft, non tender, non distended, colostomy present  - alvarado in place draining clear yellow urine  Ext- no cyanosis, clubbing, edema 82 yo M with PMHx of recurrent UTIs, CHF (LVEF 30% in Aug 2016), MI s/p CABG, AICD, A-fib on Xarelto,  prostate CA s/p radiation (2004), colon CA s/p chemo (2004) and resection with colostomy, HTN, HLD, TIA, PVD s/p right foot amputation of toes 1-3, COPD and left hip fx presented to the ED with MDR UTI with culture performed outpatient with Dr. Remy. The patient has a history of recurrent UTIs and follows with urology, Dr. Remy. The patient had taken a course of Keflex and Cipro a few weeks prior to admission. Day before admission, the patient was prescribed Bactrim, but he did not take any of the medication. Patient admitted to burning with urination in the morning. Denied increased urinary frequency, gross hematuria, abdominal pain, N/V/D/C, fever, chills. The patient self catheterizes 4 times per day s/p prostate cancer tx.      In the ED, T 98.1, HR 64, /63, RR 16, SPO2 99% on RA. WBC 9, Hgb 11.5, Hct 36.2, platelets 195, PT 20.6, INR 1.87, PTT 41.5, Cr 1.5, Glucose 106, lactate 1.5, lipase 147, UA: neg. CXR: Cardiomegaly. No active infiltrates. Small right pleural effusion. Outpatient UCx: Pseudomonas aeruginosa >100,000 cfu/ml, Elizabethkingia meningoseptica >100,000 cfu/ml.    Admitted to general medical floor with UTI, positive outpatient blood cultures, given one dose of azactam in ED. Evaluated by urology (Dr Be) who inserted alvarado catheter in ED. Per  and ID, likely not acute UTI, antibiotics not continued. Repeat blood cultures and urine culture were negative. Also pulm consulted for sleep apnea, but patient refused cpap. Left eye erythematous with some blood pooling, patient states this happens to him intermittently at home and he suffers from dry eyes. He was given wetting eye drops, no acute changes in vision or extraocular movements. He will follow up with opthamologist as outpatient. Renal ultrasound done showing no hydronephrosis. Per patient, he is at his baseline creatinine (ranges 1.4-1.6). Alvarado discontinued, patient may continue CIC at home. Cleared for discharge by .  Patient stable for discharge home, follow up with urology (Dr Remy) and PCP (Dr Del Castillo) upon discharge.    PE on day of discharge:  Vitals- T: 36.4, HR: 60, BP: 121/63, RR: 17, SpO2: 97% on RA  Gen- no acute distress, resting comfortably in bed  HEENT- left eye erythematous with slight blood pooling, pupils reactive, EOMI, no surrounding tenderness, no changes in vision   Neuro- awake alert and oriented x3, CN II-XII intact  Cardio- (+) S1/S2, no murmur appreciated   Resp- clear to auscultation bilaterally, no wheezes/rales/rhonchi  Abd- soft, non tender, non distended, colostomy present  Ext- no cyanosis, clubbing, edema

## 2017-06-01 NOTE — CONSULT NOTE ADULT - SUBJECTIVE AND OBJECTIVE BOX
[facility], Division of Infectious Diseases  CASTRO Wolfe A. Lee  [phone]    LEANDRO BEAL  83y, Male  [id number]    HPI:  84 yo M with PMHx of recurrent UTIs, CHF (LVEF 30% in Aug 2016), MI s/p CABG, AICD, A-fib on Xarelto,  prostate CA s/p radiation (2004), colon CA s/p chemo (2004) and resection with colostomy, HTN, HLD, TIA, PVD s/p right foot amputation of toes 1-3, COPD uses oxygen at night and left hip fx presented to the ED with MDR UTI with culture performed outpatient with Dr. Remy. The patient has a history of recurrent UTIs and follows with urology, Dr. Remy. The patient had taken a course of Keflex and Cipro a few weeks ago. Yesterday, the patient was prescribed Bactrim, but he did not take any of the medication. Patient admits to burning in the morning.  He is not able to empty bladder fully and has to straight cath himself 4 times daily.  Denies associated fever or chills.  No nausea, vomiting, diarrhea or abdominal pain.  No sick contacts and no recent travel.    Outpatient UCx: Pseudomonas aeruginosa >100,000 cfu/ml, Elizabethkingia meningoseptica >100,000 cfu/ml (31 May 2017 14:43)      PMH/PSH--  Oxygen dependent  COPD (chronic obstructive pulmonary disease)  Ischemic bowel disease  Colon cancer  Automatic implantable cardioverter-defibrillator in situ  Acute MI  Afib  Hypertension  Congestive heart failure  Insomnia  Anemia, vitamin B12 deficiency  CAD (coronary artery disease)  HTN (hypertension)  Ischemic colitis, enteritis, or enterocolitis  Vitamin B 12 deficiency  Prostate cancer  Colon cancer  Afib  CKD (chronic kidney disease)  MI (myocardial infarction)  PAD (peripheral artery disease)  CAD (coronary artery disease)  Peripheral Neuropathy  HTN (hypertension)  COPD (chronic obstructive pulmonary disease)  Colostomy care  Cataract extraction status of eye, right  S/P colostomy  S/P colon resection  S/P cholecystectomy  S/P colostomy  S/P colon resection  S/P cardiac pacemaker procedure  S/P amputation  S/P small bowel resection  S/P colon resection  S/P cholecystectomy  S/P bypass graft of extremity  S/P CABG x 3      Allergies--NKDA      Medications--  Antibiotics:   Immunologic:   Other: aspirin enteric coated  traMADol  lisinopril  tamsulosin  isosorbide   mononitrate ER Tablet (IMDUR)  rivaroxaban  gabapentin  atorvastatin  fenofibrate Tablet  temazepam PRN  carvedilol  capsaicin 0.025% Cream PRN  potassium chloride    Tablet ER  pantoprazole    Tablet  mometasone 220 MICROgram(s) Inhaler  sodium chloride 0.9%.  ALBUTerol    90 MICROgram(s) HFA Inhaler PRN      Social History--  EtOH: denies ***  Tobacco:former  Drug Use: denies ***    Family/Marital History--  Family history of colon cancer (Father)  Family history of MI (myocardial infarction) (Father)  No pertinent family history in first degree relatives  Lives with wife    Travel/Environmental/Occupational History:  retired    Review of Systems:  A >=10-point review of systems was obtained.     Pertinent positives and negatives--  Constitutional: No fevers. No Chills. No Rigors.   Eyes:  ENMT:  Cardiovascular: No chest pain. No palpitations.  Respiratory: No shortness of breath. No cough.  Gastrointestinal: No nausea or vomiting. No diarrhea or constipation.   Genitourinary: dysuruia, no frequency  Musculoskeletal: leg pains  Skin:no rash  Neurologic:no headache  Psychiatric: Pleasant. Appropriate affect.    Review of systems otherwise negative except as previously noted.    Physical Exam--  General: Nontoxic-appearing Male in no acute distress.  Vital Signs: T(F): 97.7, Max: 98.4 (05-31 @ 19:30)  HR: 62  BP: 125/68  RR: 17  SpO2: 98%  Wt(kg): --  HEENT: AT/NC. left eye conjuctival hemorrhage . Oropharynx clear. Dentition fair.  Neck: Not rigid. No sense of mass.  Nodes: None palpable.  Lungs: Clear bilaterally without rales, wheezing or rhonchi  decreased bs  Heart: Regular rate and rhythm. No Murmur. No rub. + scar  Abdomen: Bowel sounds present and normoactive. Soft. Nondistended. Nontender.   Extremities: No cyanosis or clubbing. No edema. fungal nails.  right lower ext metatarsal amputation  Skin: Warm. Dry. Good turgor. No rash. No vasculitic stigmata.  Psychiatric: Appropriate affect and mood for situation.     Laboratory & Imaging Data--  CBC                        11.5   9.0   )-----------( 195      ( 31 May 2017 13:29 )             36.2       Chemistries  05-31    135  |  98  |  18  ----------------------------<  106<H>  4.2   |  28  |  1.50<H>    Ca    9.7      31 May 2017 13:29    TPro  7.0  /  Alb  3.5  /  TBili  0.5  /  DBili  x   /  AST  20  /  ALT  15  /  AlkPhos  53  05-31      Culture Data    Urinalysis (05.31.17 @ 14:32)    Glucose Qualitative, Urine: Negative    pH Urine: 5.0    Blood, Urine: Negative    Color: Yellow    Urine Appearance: Clear    Bilirubin: Negative    Ketone - Urine: Negative    Specific Gravity: 1.005    Protein, Urine: Negative    Urobilinogen: Negative    Nitrite: Negative    Leukocyte Esterase Concentration: Negative
CHIEF COMPLAINT:Recurrent UTI    HISTORY OF PRESENT ILLNESS:    The patient is an 83 year old male with a recurrent UTI.  He is followed by a urologist, Dr. Remy from [facility].  He was told that he had a UTI secondary to a multidrug resistant organism and told to go to the ER.  He has no fever or chills.  He is on self catheterization 4x/day but voids small amounts and has noted dysuria.  He has a h/o recurrent prostate ca s/p RT in .  His last PSA 1 month ago was 12.    PAST MEDICAL & SURGICAL HISTORY:  Oxygen dependent: wears 2LNC at HS  COPD (chronic obstructive pulmonary disease)  Ischemic bowel disease  Colon cancer  Automatic implantable cardioverter-defibrillator in situ  Acute MI  Afib  Hypertension  Congestive heart failure  Insomnia  Anemia, vitamin B12 deficiency  CAD (coronary artery disease)  HTN (hypertension)  Ischemic colitis, enteritis, or enterocolitis  Vitamin B 12 deficiency  Prostate cancer  Colon cancer  Afib  CKD (chronic kidney disease)  MI (myocardial infarction)  PAD (peripheral artery disease)  CAD (coronary artery disease)  Peripheral Neuropathy  HTN (hypertension)  COPD (chronic obstructive pulmonary disease)  Colostomy care  Cataract extraction status of eye, right  S/P colostomy  S/P colon resection  S/P cholecystectomy  S/P colostomy  S/P colon resection  S/P cardiac pacemaker procedure: AICD  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  S/P small bowel resection  S/P colon resection  S/P cholecystectomy  S/P bypass graft of extremity: Left to right femoral-femoral artery bypass graft 10 years ago  S/P CABG x 3      REVIEW OF SYSTEMS:    CONSTITUTIONAL: No weakness, fevers or chills  EYES/ENT: No visual changes;  No vertigo or throat pain   NECK: No pain or stiffness  RESPIRATORY: No cough, wheezing, hemoptysis; No shortness of breath  CARDIOVASCULAR: No chest pain or palpitations  GASTROINTESTINAL: No abdominal or epigastric pain. No nausea, vomiting, or hematemesis; No diarrhea or constipation. No melena or hematochezia.  NEUROLOGICAL: No numbness or weakness  SKIN: No itching, burning, rashes, or lesions   All other review of systems is negative unless indicated above.    MEDICATIONS  (STANDING):  aspirin enteric coated 81milliGRAM(s) Oral daily  traMADol 50milliGRAM(s) Oral daily  lisinopril 10milliGRAM(s) Oral daily  tamsulosin 0.4milliGRAM(s) Oral at bedtime  isosorbide   mononitrate ER Tablet (IMDUR) 60milliGRAM(s) Oral daily  rivaroxaban 15milliGRAM(s) Oral daily  gabapentin 100milliGRAM(s) Oral three times a day  atorvastatin 20milliGRAM(s) Oral at bedtime  fenofibrate Tablet 12milliGRAM(s) Oral daily  carvedilol 12.5milliGRAM(s) Oral every 12 hours  torsemide 20milliGRAM(s) Oral daily  potassium chloride    Tablet ER 10milliEquivalent(s) Oral daily  pantoprazole    Tablet 40milliGRAM(s) Oral before breakfast  mometasone 220 MICROgram(s) Inhaler 1Puff(s) Inhalation daily  sodium chloride 0.9%. 1000milliLiter(s) IV Continuous <Continuous>    MEDICATIONS  (PRN):  temazepam 15milliGRAM(s) Oral at bedtime PRN Insomnia  capsaicin 0.025% Cream 1Application(s) Topical three times a day PRN Pain      Allergies    No Known Allergies            SOCIAL HISTORY:    FAMILY HISTORY:  Family history of colon cancer (Father)  Family history of MI (myocardial infarction) (Father)  No pertinent family history in first degree relatives      Vital Signs Last 24 Hrs  T(C): 36.9, Max: 36.9 (05-31 @ 19:30)  T(F): 98.4, Max: 98.4 (05-31 @ 19:30)  HR: 72 (64 - 72)  BP: 127/72 (126/63 - 136/69)  BP(mean): --  RR: 15 (15 - 16)  SpO2: 99% (98% - 99%)    PHYSICAL EXAM:    Constitutional: NAD, well-developed  HEENT: RAMBO, EOMI, Normal Hearing, MMM  Neck: No LAD, No JVD  Back: Normal spine flexure, No CVA tenderness  Respiratory: CTAB   Cardiovascular: S1 and S2, RRR, no M/G/R  Abd:soft, NT/ND, No CVAT.  Colostomy noted  : Normal phallus,open meatus,penile prosthesis in place bilateral descended testes, no masses  Extremities: No peripheral edema  Vascular: 2+ peripheral pulses  Neurological: A/O x 3, no focal deficits  Psychiatric: Normal mood, normal affect  Musculoskeletal: 5/5 strength b/l upper and lower extremities  Skin: No rashes    LABS:                        11.5   9.0   )-----------( 195      ( 31 May 2017 13:29 )             36.2         135  |  98  |  18  ----------------------------<  106<H>  4.2   |  28  |  1.50<H>    Ca    9.7      31 May 2017 13:29    TPro  7.0  /  Alb  3.5  /  TBili  0.5  /  DBili  x   /  AST  20  /  ALT  15  /  AlkPhos  53      PT/INR - ( 31 May 2017 13:29 )   PT: 20.6 sec;   INR: 1.87 ratio         PTT - ( 31 May 2017 13:29 )  PTT:41.5 sec  Urinalysis Basic - ( 31 May 2017 14:32 )    Color: Yellow / Appearance: Clear / S.005 / pH: x  Gluc: x / Ketone: Negative  / Bili: Negative / Urobili: Negative   Blood: x / Protein: Negative / Nitrite: Negative   Leuk Esterase: Negative / RBC: x / WBC x   Sq Epi: x / Non Sq Epi: x / Bacteria: x      Urine Culture:     RADIOLOGY & ADDITIONAL STUDIES:      CT Abd/Pelvis 17    IMPRESSION: Small bilateral pleural effusions, unchanged.    Mild bilateral hydroureteronephrosis with dilatation of the ureters to   the level of the urinary bladder.    No significant lymphadenopathy.    Parastomal hernia containing nonobstructed bowel.IMPRESSION: Small bilateral pleural effusions, unchanged.    Mild bilateral hydroureteronephrosis with dilatation of the ureters to   the level of the urinary bladder.    No significant lymphadenopathy.    Parastomal hernia containing nonobstructed bowel.
Date/Time Patient Seen:  		  Referring MD:   Data Reviewed	       82 yo M with PMHx of recurrent UTIs, CHF (LVEF 30% in Aug 2016), MI s/p CABG, AICD, A-fib on Xarelto,  prostate CA s/p radiation (2004), colon CA s/p chemo (2004) and resection with colostomy, HTN, HLD, TIA, PVD s/p right foot amputation of toes 1-3, COPD and left hip fx presented to the ED with MDR UTI with culture performed outpatient with Dr. Remy. The patient has a history of recurrent UTIs and follows with urology, Dr. Remy. The patient had taken a course of Keflex and Cipro a few weeks ago. Yesterday, the patient was prescribed Bactrim, but he did not take any of the medication. Patient admits to burning with urination in the morning. Denies increased urinary frequency, gross hematuria, abdominal pain, N/V/D/C, fever, chills. The patient self catheterizes 4 times per day s/p prostate cancer tx.      In the ED, T 98.1, HR 64, /63, RR 16, SPO2 99% on RA. WBC 9, Hgb 11.5, Hct 36.2, platelets 195, PT 20.6, INR 1.87, PTT 41.5, Cr 1.5, Glucose 106, lactate 1.5, lipase 147, UA: neg  CXR: Cardiomegaly. No active infiltrates. Small right pleural effusion.  Outpatient UCx: Pseudomonas aeruginosa >100,000 cfu/ml, Elizabethkingia meningoseptica >100,000 cfu/ml    pulm eval for SHALA and pleural eff        Subjective/HPI     PAST MEDICAL & SURGICAL HISTORY:  Oxygen dependent: wears 2LNC at HS  COPD (chronic obstructive pulmonary disease)  Ischemic bowel disease  Colon cancer  Automatic implantable cardioverter-defibrillator in situ  Acute MI  Afib  Hypertension  Congestive heart failure  Insomnia  Anemia, vitamin B12 deficiency  CAD (coronary artery disease)  HTN (hypertension)  Ischemic colitis, enteritis, or enterocolitis  Vitamin B 12 deficiency  Prostate cancer  Colon cancer  Afib  CKD (chronic kidney disease)  MI (myocardial infarction)  PAD (peripheral artery disease)  CAD (coronary artery disease)  Peripheral Neuropathy  HTN (hypertension)  COPD (chronic obstructive pulmonary disease)  Colostomy care  Cataract extraction status of eye, right  S/P colostomy  S/P colon resection  S/P cholecystectomy  S/P colostomy  S/P colon resection  S/P cardiac pacemaker procedure: AICD  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  S/P small bowel resection  S/P colon resection  S/P cholecystectomy  S/P bypass graft of extremity: Left to right femoral-femoral artery bypass graft 10 years ago  S/P CABG x 3        Medication list         MEDICATIONS  (STANDING):  aspirin enteric coated 81milliGRAM(s) Oral daily  traMADol 50milliGRAM(s) Oral daily  lisinopril 10milliGRAM(s) Oral daily  tamsulosin 0.4milliGRAM(s) Oral at bedtime  isosorbide   mononitrate ER Tablet (IMDUR) 60milliGRAM(s) Oral daily  rivaroxaban 15milliGRAM(s) Oral daily  gabapentin 100milliGRAM(s) Oral three times a day  atorvastatin 20milliGRAM(s) Oral at bedtime  fenofibrate Tablet 12milliGRAM(s) Oral daily  carvedilol 12.5milliGRAM(s) Oral every 12 hours  torsemide 20milliGRAM(s) Oral daily  potassium chloride    Tablet ER 10milliEquivalent(s) Oral daily  pantoprazole    Tablet 40milliGRAM(s) Oral before breakfast  mometasone 220 MICROgram(s) Inhaler 1Puff(s) Inhalation daily  sodium chloride 0.9%. 1000milliLiter(s) IV Continuous <Continuous>    MEDICATIONS  (PRN):  temazepam 15milliGRAM(s) Oral at bedtime PRN Insomnia  capsaicin 0.025% Cream 1Application(s) Topical three times a day PRN Pain         Vitals log        ICU Vital Signs Last 24 Hrs  T(C): 36.8, Max: 36.9 (05-31 @ 19:30)  T(F): 98.2, Max: 98.4 (05-31 @ 19:30)  HR: 62 (62 - 72)  BP: 148/80 (126/63 - 148/80)  BP(mean): --  ABP: --  ABP(mean): --  RR: 17 (15 - 17)  SpO2: 97% (97% - 99%)           Input and Output:  I&O's Detail      Lab Data                        11.5   9.0   )-----------( 195      ( 31 May 2017 13:29 )             36.2     05-31    135  |  98  |  18  ----------------------------<  106<H>  4.2   |  28  |  1.50<H>    Ca    9.7      31 May 2017 13:29    TPro  7.0  /  Alb  3.5  /  TBili  0.5  /  DBili  x   /  AST  20  /  ALT  15  /  AlkPhos  53  05-31      Family History:  Father  Still living? No  Family history of MI (myocardial infarction), Age at diagnosis: Age Unknown  Family history of colon cancer, Age at diagnosis: Age Unknown.    Social History:  Social History (marital status, living situation, occupation, tobacco use, alcohol and drug use, and sexual history): Social History:    Marital Status:  ( X )    (   ) Single    (   )    (  )   Lives with: (  ) alone  (  ) children   ( X ) spouse   (  ) parents  (  ) other    Substance Use (street drugs): ( X ) never used  (  ) other:  Tobacco Usage:  ( X  ) never smoked   (   ) former smoker   (   ) current smoker; quit 50 years ago, smoked 1 ppd/15 years    Health Management  Ambulates with walker and cane.    Immunization Hx:   ( X ) flu shot                               (     ) date   ( X ) pneumonia shot               (     ) date  ( X ) tetanus                               (     ) date        Review of Systems	      Objective     Physical Examination    heart - s1s2  lungs - dec BS  abd - soft      Pertinent Lab findings & Imaging      [place]:  NO   Adequate UO     I&O's Detail           Discussed with:     Cultures:	        Radiology      LOWER CHEST: Small bilateral pleural effusions, unchanged. Cardiomegaly.   A pacemaker is seen. A 9 mm right lower lobe nodule is unchanged as   compared to prior CTs dating back to 6/30/2014.

## 2017-06-01 NOTE — DISCHARGE NOTE ADULT - SECONDARY DIAGNOSIS.
BRIGETTE (acute kidney injury) Afib CAD (coronary artery disease) COPD (chronic obstructive pulmonary disease) Congestive heart failure HTN (hypertension) CKD (chronic kidney disease)

## 2017-06-01 NOTE — DISCHARGE NOTE ADULT - CARE PLAN
Principal Discharge DX:	Urinary tract infection, recurrent  Secondary Diagnosis:	BRIGETTE (acute kidney injury)  Secondary Diagnosis:	Afib  Secondary Diagnosis:	CAD (coronary artery disease)  Secondary Diagnosis:	COPD (chronic obstructive pulmonary disease)  Secondary Diagnosis:	Congestive heart failure  Secondary Diagnosis:	HTN (hypertension) Principal Discharge DX:	Urinary tract infection, recurrent  Goal:	resolution  Secondary Diagnosis:	BRIGETTE (acute kidney injury)  Secondary Diagnosis:	Afib  Instructions for follow-up, activity and diet:	rate controlled  continue anticoagulation with xarelto  Secondary Diagnosis:	CAD (coronary artery disease)  Secondary Diagnosis:	COPD (chronic obstructive pulmonary disease)  Secondary Diagnosis:	Congestive heart failure  Secondary Diagnosis:	HTN (hypertension) Principal Discharge DX:	UTI (urinary tract infection)  Goal:	resolution  Instructions for follow-up, activity and diet:	Likely not acute UTI, no need for antibiotics  -maintain adequate oral hydration   -follow up with  (Dr Remy) upon dishcarge  Secondary Diagnosis:	BRIGETTE (acute kidney injury)  Instructions for follow-up, activity and diet:	Likely secondary to  Secondary Diagnosis:	Afib  Instructions for follow-up, activity and diet:	rate controlled  continue anticoagulation with xarelto  Secondary Diagnosis:	CAD (coronary artery disease)  Secondary Diagnosis:	COPD (chronic obstructive pulmonary disease)  Secondary Diagnosis:	Congestive heart failure  Secondary Diagnosis:	HTN (hypertension) Principal Discharge DX:	UTI (urinary tract infection)  Goal:	resolution  Instructions for follow-up, activity and diet:	Likely not acute UTI, no need for antibiotics  -maintain adequate oral hydration   -follow up with  (Dr Remy) in 2 weeks  Secondary Diagnosis:	CKD (chronic kidney disease)  Instructions for follow-up, activity and diet:	stable, at baseline creatinine   -follow up with PCP (Dr Del Castillo) upon discharge  Secondary Diagnosis:	Afib  Instructions for follow-up, activity and diet:	rate controlled  continue anticoagulation with xarelto and carvedilol  Secondary Diagnosis:	CAD (coronary artery disease)  Instructions for follow-up, activity and diet:	continue aspirin, imdur  Secondary Diagnosis:	COPD (chronic obstructive pulmonary disease)  Instructions for follow-up, activity and diet:	continue mometasone  Secondary Diagnosis:	Congestive heart failure  Instructions for follow-up, activity and diet:	continue torsemide and lisinopril  Secondary Diagnosis:	HTN (hypertension)  Instructions for follow-up, activity and diet:	continue lisinopril and carvedilol Principal Discharge DX:	UTI (urinary tract infection)  Goal:	resolution  Instructions for follow-up, activity and diet:	urine culture negative,  no need for antibiotics  -maintain adequate oral hydration   -follow up with  (Dr Remy) in 3-5 days  Secondary Diagnosis:	CKD (chronic kidney disease)  Instructions for follow-up, activity and diet:	stable, at baseline creatinine   -follow up with PCP (Dr Del Castillo) upon discharge within 3-5 days  Secondary Diagnosis:	Afib  Instructions for follow-up, activity and diet:	rate controlled  continue home medications  Secondary Diagnosis:	CAD (coronary artery disease)  Instructions for follow-up, activity and diet:	continue home medications  Secondary Diagnosis:	COPD (chronic obstructive pulmonary disease)  Instructions for follow-up, activity and diet:	continue home medications  Secondary Diagnosis:	Congestive heart failure  Instructions for follow-up, activity and diet:	continue home medications  Secondary Diagnosis:	HTN (hypertension)  Instructions for follow-up, activity and diet:	continue home medications

## 2017-06-01 NOTE — DISCHARGE NOTE ADULT - CARE PROVIDERS DIRECT ADDRESSES
[email or web address]

## 2017-06-01 NOTE — PROGRESS NOTE ADULT - PROBLEM SELECTOR PLAN 9
S/P radiation.   Continue with daily self-catheterization  Continue Tamsulosin S/P radiation.   Continue alvarado catheter  Continue Tamsulosin  f/u with -

## 2017-06-01 NOTE — PROGRESS NOTE ADULT - ATTENDING COMMENTS
Anemia- h/h is stable, chronic    Patient was seen and examined with team. Agree with above. Continue current management. Follow up cultures. Case discussed with patient and .

## 2017-06-01 NOTE — DISCHARGE NOTE ADULT - MEDICATION SUMMARY - MEDICATIONS TO TAKE
I will START or STAY ON the medications listed below when I get home from the hospital:    Aspirin Enteric Coated 81 mg oral delayed release tablet  -- 1 tab(s) by mouth once a day  -- Indication: For CAD (coronary artery disease)    tramadol 50 mg oral tablet  -- 1 tab(s) by mouth once a day  -- Indication: For PAin    lisinopril 10 mg oral tablet  -- 1 tab(s) by mouth once a day  -- Indication: For HTN (hypertension)    tamsulosin 0.4 mg oral capsule  -- 1 cap(s) by mouth once a day  -- Indication: For Prostate cancer    isosorbide mononitrate 60 mg oral tablet, extended release  -- 1 tab(s) by mouth once a day  -- Indication: For CAD (coronary artery disease)    rivaroxaban 15 mg oral tablet  -- 1 tab(s) by mouth once a day (at bedtime)  -- Indication: For Afib    gabapentin 100 mg oral capsule  -- 1 cap(s) by mouth 3 times a day  -- Indication: For Neuropathy    fenofibrate 48 mg oral tablet  -- 0.25 tab(s) by mouth once a day  -- Indication: For HLD    atorvastatin 20 mg oral tablet  -- 1 tab(s) by mouth once a day  -- Indication: For HLD    temazepam 15 mg oral capsule  -- 1 cap(s) by mouth once a day (at bedtime), As needed, Insomnia  -- Indication: For Insomnia    carvedilol 12.5 mg oral tablet  -- 1 tab(s) by mouth every 12 hours  -- Indication: For HTN (hypertension)    capsaicin 0.025% topical cream  -- Apply on skin to affected area 3 times a day, As Needed  -- Indication: For topical    potassium chloride 10 mEq oral tablet, extended release  -- 1 tab(s) by mouth once a day  -- Indication: For vitamin    omeprazole 20 mg oral delayed release capsule  -- 1 cap(s) by mouth once a day  -- Indication: For gerd    mometasone 220 mcg/inh inhalation aerosol powder  -- 1 puff(s) inhaled once a day (in the evening)  -- Indication: For COPD (chronic obstructive pulmonary disease) I will START or STAY ON the medications listed below when I get home from the hospital:    Aspirin Enteric Coated 81 mg oral delayed release tablet  -- 1 tab(s) by mouth once a day  -- Indication: For CAD (coronary artery disease)    tramadol 50 mg oral tablet  -- 1 tab(s) by mouth once a day  -- Indication: For PAin    lisinopril 10 mg oral tablet  -- 1 tab(s) by mouth once a day  -- Indication: For HTN (hypertension)    tamsulosin 0.4 mg oral capsule  -- 1 cap(s) by mouth once a day  -- Indication: For Prostate cancer    isosorbide mononitrate 60 mg oral tablet, extended release  -- 1 tab(s) by mouth once a day  -- Indication: For CAD (coronary artery disease)    rivaroxaban 15 mg oral tablet  -- 1 tab(s) by mouth once a day (at bedtime)  -- Indication: For Afib    gabapentin 100 mg oral capsule  -- 1 cap(s) by mouth 3 times a day  -- Indication: For Neuropathy    fenofibrate 48 mg oral tablet  -- 0.25 tab(s) by mouth once a day  -- Indication: For HLD    atorvastatin 20 mg oral tablet  -- 1 tab(s) by mouth once a day  -- Indication: For HLD    temazepam 15 mg oral capsule  -- 1 cap(s) by mouth once a day (at bedtime), As needed, Insomnia  -- Indication: For Insomnia    carvedilol 12.5 mg oral tablet  -- 1 tab(s) by mouth every 12 hours  -- Indication: For HTN (hypertension)    capsaicin 0.025% topical cream  -- Apply on skin to affected area 3 times a day, As Needed  -- Indication: For topical    urea 20% topical cream  -- Apply on skin to affected area 2 times a day, As Needed  -- Indication: For topical     torsemide 20 mg oral tablet  -- 1 tab(s) by mouth once a day  -- Indication: For Congestive heart failure    potassium chloride 10 mEq oral tablet, extended release  -- 1 tab(s) by mouth once a day  -- Indication: For vitamin    omeprazole 20 mg oral delayed release capsule  -- 1 cap(s) by mouth once a day  -- Indication: For gerd    mometasone 220 mcg/inh inhalation aerosol powder  -- 1 puff(s) inhaled once a day (in the evening)  -- Indication: For COPD (chronic obstructive pulmonary disease)

## 2017-06-01 NOTE — DISCHARGE NOTE ADULT - ADDITIONAL INSTRUCTIONS
Follow up with urology (Dr Remy) upon discharge Follow up with urology (Dr Remy) in 2 weeks and PCP (Dr Del Castillo) upon discharge -Prostate cancer- continue self catherization, continue home meds. follow up with  within 3-5 days  -peripheral neuropathy- continue home medications  -L eye redness- please follow up with opthamologist within 1-2 days, return to ER if you have any changes in vision or pain or worsening of any symptoms  -Obstructive sleep apnea- recommend cpap use, follow up with primary care physician  -Peripheral vascular disease - continue home medications   -Anemia-chronic, monitor hemoglobin  -return to ER if you develop chest pain, shortness of breath, palpitations, abdominal pain, urinary retention, changes in vision or pain in the eye, weakness or worsening of any symptoms  -Follow up with primary care physician and urologist within 3-5 days

## 2017-06-01 NOTE — PROGRESS NOTE ADULT - SUBJECTIVE AND OBJECTIVE BOX
Date/Time Patient Seen:  		  Referring MD:   Data Reviewed	       Patient is a 83y old  Male who presents with a chief complaint of MDR UTI (31 May 2017 14:43)  in bed  seen and examined  vs and meds reviewed        Subjective/HPI     PAST MEDICAL & SURGICAL HISTORY:  Oxygen dependent: wears 2LNC at HS  COPD (chronic obstructive pulmonary disease)  Ischemic bowel disease  Colon cancer  Automatic implantable cardioverter-defibrillator in situ  Acute MI  Afib  Hypertension  Congestive heart failure  Insomnia  Anemia, vitamin B12 deficiency  CAD (coronary artery disease)  HTN (hypertension)  Ischemic colitis, enteritis, or enterocolitis  Vitamin B 12 deficiency  Prostate cancer  Colon cancer  Afib  CKD (chronic kidney disease)  MI (myocardial infarction)  PAD (peripheral artery disease)  CAD (coronary artery disease)  Peripheral Neuropathy  HTN (hypertension)  COPD (chronic obstructive pulmonary disease)  Colostomy care  Cataract extraction status of eye, right  S/P colostomy  S/P colon resection  S/P cholecystectomy  S/P colostomy  S/P colon resection  S/P cardiac pacemaker procedure: AICD  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  S/P small bowel resection  S/P colon resection  S/P cholecystectomy  S/P bypass graft of extremity: Left to right femoral-femoral artery bypass graft 10 years ago  S/P CABG x 3        Medication list         MEDICATIONS  (STANDING):  aspirin enteric coated 81milliGRAM(s) Oral daily  traMADol 50milliGRAM(s) Oral daily  lisinopril 10milliGRAM(s) Oral daily  tamsulosin 0.4milliGRAM(s) Oral at bedtime  isosorbide   mononitrate ER Tablet (IMDUR) 60milliGRAM(s) Oral daily  rivaroxaban 15milliGRAM(s) Oral daily  gabapentin 100milliGRAM(s) Oral three times a day  atorvastatin 20milliGRAM(s) Oral at bedtime  fenofibrate Tablet 12milliGRAM(s) Oral daily  carvedilol 12.5milliGRAM(s) Oral every 12 hours  potassium chloride    Tablet ER 10milliEquivalent(s) Oral daily  pantoprazole    Tablet 40milliGRAM(s) Oral before breakfast  mometasone 220 MICROgram(s) Inhaler 1Puff(s) Inhalation daily  sodium chloride 0.9%. 1000milliLiter(s) IV Continuous <Continuous>    MEDICATIONS  (PRN):  temazepam 15milliGRAM(s) Oral at bedtime PRN Insomnia  capsaicin 0.025% Cream 1Application(s) Topical three times a day PRN Pain  ALBUTerol    90 MICROgram(s) HFA Inhaler 2Puff(s) Inhalation every 6 hours PRN Shortness of Breath and/or Wheezing         Vitals log        ICU Vital Signs Last 24 Hrs  T(C): 36.5, Max: 36.9 (05-31 @ 19:30)  T(F): 97.7, Max: 98.4 (05-31 @ 19:30)  HR: 62 (62 - 72)  BP: 125/68 (125/68 - 148/80)  BP(mean): --  ABP: --  ABP(mean): --  RR: 17 (15 - 17)  SpO2: 98% (97% - 99%)           Input and Output:  I&O's Detail    I & Os for current day (as of 01 Jun 2017 06:18)  =============================================  IN:    sodium chloride 0.9%.: 350 ml    Total IN: 350 ml  ---------------------------------------------  OUT:    Indwelling Catheter - Urethral: 2000 ml    Colostomy: 50 ml    Total OUT: 2050 ml  ---------------------------------------------  Total NET: -1700 ml      Lab Data                        11.5   9.0   )-----------( 195      ( 31 May 2017 13:29 )             36.2     05-31    135  |  98  |  18  ----------------------------<  106<H>  4.2   |  28  |  1.50<H>    Ca    9.7      31 May 2017 13:29    TPro  7.0  /  Alb  3.5  /  TBili  0.5  /  DBili  x   /  AST  20  /  ALT  15  /  AlkPhos  53  05-31            Review of Systems	      Objective     Physical Examination  head at  heart - s1s2  lungs - dec BS  abd - soft  neck obese        Pertinent Lab findings & Imaging      Kelin:  NO   Adequate UO     I&O's Detail    I & Os for current day (as of 01 Jun 2017 06:18)  =============================================  IN:    sodium chloride 0.9%.: 350 ml    Total IN: 350 ml  ---------------------------------------------  OUT:    Indwelling Catheter - Urethral: 2000 ml    Colostomy: 50 ml    Total OUT: 2050 ml  ---------------------------------------------  Total NET: -1700 ml           Discussed with:     Cultures:	        Radiology

## 2017-06-01 NOTE — DISCHARGE NOTE ADULT - PROVIDER TOKENS
JUAN:'[id number]' TOKEN:'[id number]',FREE:[LAST:[Dr Del Castillo],PHONE:[(   )    -],FAX:[(   )    -]]

## 2017-06-01 NOTE — PROGRESS NOTE ADULT - PROBLEM SELECTOR PLAN 1
-UA done in ED negative, outpatient UCx: Pseudomonas aeruginosa >100,000 cfu/ml, Elizabethkingia meningoseptica >100,000 cfu/ml  -per  and ID, likely not active infection no need for antibiotics at this time  -follow up repeat urine cultures -UA done in ED negative, outpatient UCx: Pseudomonas aeruginosa >100,000 cfu/ml, Elizabethkingia meningoseptica >100,000 cfu/ml  -per  and ID, likely not active infection no need for antibiotics at this time  -repeat renal sono tomorrow and maintain alvarado catheter for now  -follow up repeat urine cultures -UA done in ED negative, outpatient UCx: Pseudomonas aeruginosa >100,000 cfu/ml, Elizabethkingia meningoseptica >100,000 cfu/ml  -per  and ID, likely not active infection no need for antibiotics at this time  -repeat renal sono tomorrow and maintain alvarado catheter for now  -follow up repeat urine cultures  ID-Dr.Lee MUÑOZ-

## 2017-06-01 NOTE — DISCHARGE NOTE ADULT - CARE PROVIDER_API CALL
Bran Remy), Urology  [address] [place] [address]  Phone: [phone]  Fax: [phone] Bran Remy), Urology  [address] [place] [address]  Phone: [phone]  Fax: [phone]    Dr Del Castillo,   Phone: (   )    -  Fax: (   )    -

## 2017-06-01 NOTE — CONSULT NOTE ADULT - PROBLEM SELECTOR RECOMMENDATION 9
Urinalysis is not showing active infection.  Could be suppressed given outpt antibx  He could also be colonized with resistant bacteria.  He does not have any other signs or symptoms of active infection.  i.e. fever, leukocytosis and urine is clear.  The dysuria is in the morning when he does his cath.  It could be technique.   Follow up cx and sensitivities from outside and can use a imipenem Urinalysis is not showing active infection.  Could be suppressed given outpt antibx  He could also be colonized with resistant bacteria.  He does not have any other signs or symptoms of active infection.  i.e. fever, leukocytosis and urine is clear.  The dysuria is in the morning when he does his cath.  It could be technique.   Defer antibiotics for now.

## 2017-06-01 NOTE — DISCHARGE NOTE ADULT - PLAN OF CARE
rate controlled  continue anticoagulation with xarelto resolution Likely not acute UTI, no need for antibiotics  -maintain adequate oral hydration   -follow up with  (Dr Remy) upon dishcarge Likely secondary to continue aspirin, imdur continue torsemide and lisinopril continue mometasone continue lisinopril and carvedilol rate controlled  continue anticoagulation with xarelto and carvedilol Likely not acute UTI, no need for antibiotics  -maintain adequate oral hydration   -follow up with  (Dr Remy) in 2 weeks stable, at baseline creatinine   -follow up with PCP (Dr Del Castillo) upon discharge continue home medications rate controlled  continue home medications urine culture negative,  no need for antibiotics  -maintain adequate oral hydration   -follow up with  (Dr Remy) in 3-5 days stable, at baseline creatinine   -follow up with PCP (Dr Del Castillo) upon discharge within 3-5 days

## 2017-06-01 NOTE — PROGRESS NOTE ADULT - SUBJECTIVE AND OBJECTIVE BOX
CHIEF COMPLAINT/INTERVAL HISTORY: Patient was admitted yesterday with MDR resistant UTI after failed outpatient antibiotics. Patient seen and examined at bedside. This AM he admits to feeling well, he did have some discomfort earlier today due to alvarado catheter but this has now improved. States that he self catheterizes at home was experiencing some discomfort lately prior to admission. He admits to having dry eyes chronically and he scratched them this morning which caused irritation in left eye.    REVIEW OF SYSTEMS: Denies fevers, chills, headaches, dizziness, chest pain, shortness of breath, abdominal pain, N/V/D.    Vital Signs Last 24 Hrs  T(C): 36.5, Max: 36.9 ( @ 19:30)  T(F): 97.7, Max: 98.4 ( @ 19:30)  HR: 62 (62 - 72)  BP: 125/68 (125/68 - 148/80)  BP(mean): --  RR: 17 (15 - 17)  SpO2: 98% (97% - 99%)    PHYSICAL EXAM:  GENERAL: no acute distress, awake and alert  HEENT: left eye erythema with some blood pooling, no tenderness, EOMI, hearing normal  Chest: clear to auscultation bilaterally, no wheezing  CV: regular rate and rhythm, (+) S1S2    GI: soft, +BS, non tender, non distended, no suprapubic tenderness  : alvarado catheter in place draining clear yellow urine  Musculoskeletal: no edema  Psychiatric: affect nL, mood nL  Skin: warm and dry      I & Os for current day (as of  @ 09:42)  =============================================  IN: 500 ml / OUT: 2050 ml / NET: -1550 ml      LABS:                        10.8   9.2   )-----------( 194      ( 2017 09:00 )             34.4     06-    137  |  100  |  17  ----------------------------<  83  4.4   |  30  |  1.40<H>    Ca    9.6      2017 09:00    TPro  6.4  /  Alb  3.2<L>  /  TBili  0.7  /  DBili  x   /  AST  16  /  ALT  11<L>  /  AlkPhos  48  06-01    PT/INR - ( 31 May 2017 13:29 )   PT: 20.6 sec;   INR: 1.87 ratio         PTT - ( 31 May 2017 13:29 )  PTT:41.5 sec  Urinalysis Basic - ( 31 May 2017 14:32 )    Color: Yellow / Appearance: Clear / S.005 / pH: x  Gluc: x / Ketone: Negative  / Bili: Negative / Urobili: Negative   Blood: x / Protein: Negative / Nitrite: Negative   Leuk Esterase: Negative / RBC: x / WBC x   Sq Epi: x / Non Sq Epi: x / Bacteria: x              RECENT CULTURES:          Radiology              aspirin enteric coated 81milliGRAM(s) Oral daily  traMADol 50milliGRAM(s) Oral daily  lisinopril 10milliGRAM(s) Oral daily  tamsulosin 0.4milliGRAM(s) Oral at bedtime  isosorbide   mononitrate ER Tablet (IMDUR) 60milliGRAM(s) Oral daily  rivaroxaban 15milliGRAM(s) Oral daily  gabapentin 100milliGRAM(s) Oral three times a day  atorvastatin 20milliGRAM(s) Oral at bedtime  fenofibrate Tablet 12milliGRAM(s) Oral daily  temazepam 15milliGRAM(s) Oral at bedtime PRN  carvedilol 12.5milliGRAM(s) Oral every 12 hours  capsaicin 0.025% Cream 1Application(s) Topical three times a day PRN  potassium chloride    Tablet ER 10milliEquivalent(s) Oral daily  pantoprazole    Tablet 40milliGRAM(s) Oral before breakfast  mometasone 220 MICROgram(s) Inhaler 1Puff(s) Inhalation daily  sodium chloride 0.9%. 1000milliLiter(s) IV Continuous <Continuous>  ALBUTerol    90 MICROgram(s) HFA Inhaler 2Puff(s) Inhalation every 6 hours PRN CHIEF COMPLAINT/INTERVAL HISTORY: Patient was admitted yesterday with MDR resistant UTI after failed outpatient antibiotics. Patient seen and examined at bedside. This AM he admits to feeling well, he did have some discomfort earlier today due to alvarado catheter but this has now improved. States that he self catheterizes at home was experiencing some discomfort lately prior to admission. He admits to having dry eyes chronically and he scratched them this morning which caused irritation in left eye.    REVIEW OF SYSTEMS: Denies fevers, chills, headaches, dizziness, chest pain, shortness of breath, abdominal pain, nausea, vomiting, or diarrhea.     Vital Signs Last 24 Hrs  T(C): 36.5, Max: 36.9 ( @ 19:30)  T(F): 97.7, Max: 98.4 ( @ 19:30)  HR: 62 (62 - 72)  BP: 125/68 (125/68 - 148/80)  BP(mean): --  RR: 17 (15 - 17)  SpO2: 98% (97% - 99%)    PHYSICAL EXAM:  GENERAL: no acute distress, awake and alert  HEENT: left eye erythema with some blood pooling (per patient it is not new), no tenderness, EOMI, hearing normal, PERRLA   Lungs: clear to auscultation bilaterally, no wheezing, rales or ronchi  CV: (+) S1S2 , no murmur appreciated    GI: soft, +BS, non tender, non distended, no suprapubic tenderness  : alvarado catheter in place draining clear yellow urine  Musculoskeletal: no edema or cyanosis  Neuro:aaox3     I & Os for current day (as of  @ 09:42)  =============================================  IN: 500 ml / OUT: 2050 ml / NET: -1550 ml      LABS:                        10.8   9.2   )-----------( 194      ( 2017 09:00 )             34.4     06    137  |  100  |  17  ----------------------------<  83  4.4   |  30  |  1.40<H>    Ca    9.6      2017 09:00    TPro  6.4  /  Alb  3.2<L>  /  TBili  0.7  /  DBili  x   /  AST  16  /  ALT  11<L>  /  AlkPhos  48  06-01    PT/INR - ( 31 May 2017 13:29 )   PT: 20.6 sec;   INR: 1.87 ratio         PTT - ( 31 May 2017 13:29 )  PTT:41.5 sec  Urinalysis Basic - ( 31 May 2017 14:32 )    Color: Yellow / Appearance: Clear / S.005 / pH: x  Gluc: x / Ketone: Negative  / Bili: Negative / Urobili: Negative   Blood: x / Protein: Negative / Nitrite: Negative   Leuk Esterase: Negative / RBC: x / WBC x   Sq Epi: x / Non Sq Epi: x / Bacteria: x              RECENT CULTURES:          Radiology              aspirin enteric coated 81milliGRAM(s) Oral daily  traMADol 50milliGRAM(s) Oral daily  lisinopril 10milliGRAM(s) Oral daily  tamsulosin 0.4milliGRAM(s) Oral at bedtime  isosorbide   mononitrate ER Tablet (IMDUR) 60milliGRAM(s) Oral daily  rivaroxaban 15milliGRAM(s) Oral daily  gabapentin 100milliGRAM(s) Oral three times a day  atorvastatin 20milliGRAM(s) Oral at bedtime  fenofibrate Tablet 12milliGRAM(s) Oral daily  temazepam 15milliGRAM(s) Oral at bedtime PRN  carvedilol 12.5milliGRAM(s) Oral every 12 hours  capsaicin 0.025% Cream 1Application(s) Topical three times a day PRN  potassium chloride    Tablet ER 10milliEquivalent(s) Oral daily  pantoprazole    Tablet 40milliGRAM(s) Oral before breakfast  mometasone 220 MICROgram(s) Inhaler 1Puff(s) Inhalation daily  sodium chloride 0.9%. 1000milliLiter(s) IV Continuous <Continuous>  ALBUTerol    90 MICROgram(s) HFA Inhaler 2Puff(s) Inhalation every 6 hours PRN

## 2017-06-01 NOTE — PROGRESS NOTE ADULT - ASSESSMENT
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 Remy.

## 2017-06-01 NOTE — PROGRESS NOTE ADULT - ASSESSMENT
Pt was seen at approx 0845 today:  Creat stable at 1.2  Afeb  For renal sono tomorrow, consider TOV if hydro resolved and then can resume CIC

## 2017-06-02 VITALS
DIASTOLIC BLOOD PRESSURE: 75 MMHG | SYSTOLIC BLOOD PRESSURE: 152 MMHG | HEART RATE: 64 BPM | RESPIRATION RATE: 16 BRPM | OXYGEN SATURATION: 100 % | TEMPERATURE: 98 F

## 2017-06-02 LAB
ANION GAP SERPL CALC-SCNC: 6 MMOL/L — SIGNIFICANT CHANGE UP (ref 5–17)
BUN SERPL-MCNC: 20 MG/DL — SIGNIFICANT CHANGE UP (ref 7–23)
CALCIUM SERPL-MCNC: 9.6 MG/DL — SIGNIFICANT CHANGE UP (ref 8.5–10.1)
CHLORIDE SERPL-SCNC: 102 MMOL/L — SIGNIFICANT CHANGE UP (ref 96–108)
CO2 SERPL-SCNC: 31 MMOL/L — SIGNIFICANT CHANGE UP (ref 22–31)
CREAT SERPL-MCNC: 1.5 MG/DL — HIGH (ref 0.5–1.3)
GLUCOSE SERPL-MCNC: 97 MG/DL — SIGNIFICANT CHANGE UP (ref 70–99)
HCT VFR BLD CALC: 33.8 % — LOW (ref 39–50)
HGB BLD-MCNC: 11 G/DL — LOW (ref 13–17)
MCHC RBC-ENTMCNC: 31.7 PG — SIGNIFICANT CHANGE UP (ref 27–34)
MCHC RBC-ENTMCNC: 32.5 GM/DL — SIGNIFICANT CHANGE UP (ref 32–36)
MCV RBC AUTO: 97.6 FL — SIGNIFICANT CHANGE UP (ref 80–100)
PLATELET # BLD AUTO: 174 K/UL — SIGNIFICANT CHANGE UP (ref 150–400)
POTASSIUM SERPL-MCNC: 4.6 MMOL/L — SIGNIFICANT CHANGE UP (ref 3.5–5.3)
POTASSIUM SERPL-SCNC: 4.6 MMOL/L — SIGNIFICANT CHANGE UP (ref 3.5–5.3)
RBC # BLD: 3.46 M/UL — LOW (ref 4.2–5.8)
RBC # FLD: 15.3 % — HIGH (ref 10.3–14.5)
SODIUM SERPL-SCNC: 139 MMOL/L — SIGNIFICANT CHANGE UP (ref 135–145)
WBC # BLD: 9.7 K/UL — SIGNIFICANT CHANGE UP (ref 3.8–10.5)
WBC # FLD AUTO: 9.7 K/UL — SIGNIFICANT CHANGE UP (ref 3.8–10.5)

## 2017-06-02 PROCEDURE — 76775 US EXAM ABDO BACK WALL LIM: CPT

## 2017-06-02 PROCEDURE — 87086 URINE CULTURE/COLONY COUNT: CPT

## 2017-06-02 PROCEDURE — 71045 X-RAY EXAM CHEST 1 VIEW: CPT

## 2017-06-02 PROCEDURE — 99285 EMERGENCY DEPT VISIT HI MDM: CPT | Mod: 25

## 2017-06-02 PROCEDURE — 85610 PROTHROMBIN TIME: CPT

## 2017-06-02 PROCEDURE — 85027 COMPLETE CBC AUTOMATED: CPT

## 2017-06-02 PROCEDURE — 76775 US EXAM ABDO BACK WALL LIM: CPT | Mod: 26

## 2017-06-02 PROCEDURE — 83690 ASSAY OF LIPASE: CPT

## 2017-06-02 PROCEDURE — 83605 ASSAY OF LACTIC ACID: CPT

## 2017-06-02 PROCEDURE — 94640 AIRWAY INHALATION TREATMENT: CPT

## 2017-06-02 PROCEDURE — 85730 THROMBOPLASTIN TIME PARTIAL: CPT

## 2017-06-02 PROCEDURE — 99239 HOSP IP/OBS DSCHRG MGMT >30: CPT

## 2017-06-02 PROCEDURE — 93005 ELECTROCARDIOGRAM TRACING: CPT

## 2017-06-02 PROCEDURE — 80053 COMPREHEN METABOLIC PANEL: CPT

## 2017-06-02 PROCEDURE — 87040 BLOOD CULTURE FOR BACTERIA: CPT

## 2017-06-02 PROCEDURE — 96365 THER/PROPH/DIAG IV INF INIT: CPT

## 2017-06-02 PROCEDURE — 84145 PROCALCITONIN (PCT): CPT

## 2017-06-02 PROCEDURE — 81003 URINALYSIS AUTO W/O SCOPE: CPT

## 2017-06-02 PROCEDURE — 80048 BASIC METABOLIC PNL TOTAL CA: CPT

## 2017-06-02 RX ORDER — UREA 40 %
1 CREAM (GRAM) TOPICAL
Qty: 1 | Refills: 0
Start: 2017-06-02

## 2017-06-02 RX ORDER — UREA 40 %
1 CREAM (GRAM) TOPICAL
Qty: 0 | Refills: 0 | COMMUNITY

## 2017-06-02 RX ADMIN — GABAPENTIN 100 MILLIGRAM(S): 400 CAPSULE ORAL at 14:10

## 2017-06-02 RX ADMIN — GABAPENTIN 100 MILLIGRAM(S): 400 CAPSULE ORAL at 05:57

## 2017-06-02 RX ADMIN — Medication 20 MILLIGRAM(S): at 05:57

## 2017-06-02 RX ADMIN — Medication 1 DROP(S): at 05:59

## 2017-06-02 RX ADMIN — Medication 12 MILLIGRAM(S): at 11:49

## 2017-06-02 RX ADMIN — Medication 1 APPLICATION(S): at 08:30

## 2017-06-02 RX ADMIN — PANTOPRAZOLE SODIUM 40 MILLIGRAM(S): 20 TABLET, DELAYED RELEASE ORAL at 06:01

## 2017-06-02 RX ADMIN — ISOSORBIDE MONONITRATE 60 MILLIGRAM(S): 60 TABLET, EXTENDED RELEASE ORAL at 11:49

## 2017-06-02 RX ADMIN — Medication 650 MILLIGRAM(S): at 09:40

## 2017-06-02 RX ADMIN — MOMETASONE FUROATE 1 PUFF(S): 220 INHALANT RESPIRATORY (INHALATION) at 08:25

## 2017-06-02 RX ADMIN — Medication 81 MILLIGRAM(S): at 11:49

## 2017-06-02 RX ADMIN — LISINOPRIL 10 MILLIGRAM(S): 2.5 TABLET ORAL at 05:57

## 2017-06-02 RX ADMIN — TRAMADOL HYDROCHLORIDE 50 MILLIGRAM(S): 50 TABLET ORAL at 11:50

## 2017-06-02 RX ADMIN — Medication 650 MILLIGRAM(S): at 08:29

## 2017-06-02 RX ADMIN — CARVEDILOL PHOSPHATE 12.5 MILLIGRAM(S): 80 CAPSULE, EXTENDED RELEASE ORAL at 05:57

## 2017-06-02 NOTE — PROGRESS NOTE ADULT - PROBLEM SELECTOR PROBLEM 2
BRIGETTE (acute kidney injury)
COPD (chronic obstructive pulmonary disease)
Congestive heart failure

## 2017-06-02 NOTE — PROGRESS NOTE ADULT - SUBJECTIVE AND OBJECTIVE BOX
Date/Time Patient Seen:  		  Referring MD:   Data Reviewed	       Patient is a 83y old  Male who presents with a chief complaint of MDR UTI (01 Jun 2017 14:02)  in bed  seen and examined  vs and meds reviewed  on oxygen       Subjective/HPI     PAST MEDICAL & SURGICAL HISTORY:  Oxygen dependent: wears 2LNC at HS  COPD (chronic obstructive pulmonary disease)  Ischemic bowel disease  Colon cancer  Automatic implantable cardioverter-defibrillator in situ  Acute MI  Afib  Hypertension  Congestive heart failure  Insomnia  Anemia, vitamin B12 deficiency  CAD (coronary artery disease)  HTN (hypertension)  Ischemic colitis, enteritis, or enterocolitis  Vitamin B 12 deficiency  Prostate cancer  Colon cancer  Afib  CKD (chronic kidney disease)  MI (myocardial infarction)  PAD (peripheral artery disease)  CAD (coronary artery disease)  Peripheral Neuropathy  HTN (hypertension)  COPD (chronic obstructive pulmonary disease)  Colostomy care  Cataract extraction status of eye, right  S/P colostomy  S/P colon resection  S/P cholecystectomy  S/P colostomy  S/P colon resection  S/P cardiac pacemaker procedure: AICD  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  S/P small bowel resection  S/P colon resection  S/P cholecystectomy  S/P bypass graft of extremity: Left to right femoral-femoral artery bypass graft 10 years ago  S/P CABG x 3        Medication list         MEDICATIONS  (STANDING):  aspirin enteric coated 81milliGRAM(s) Oral daily  traMADol 50milliGRAM(s) Oral daily  lisinopril 10milliGRAM(s) Oral daily  tamsulosin 0.4milliGRAM(s) Oral at bedtime  isosorbide   mononitrate ER Tablet (IMDUR) 60milliGRAM(s) Oral daily  rivaroxaban 15milliGRAM(s) Oral daily  gabapentin 100milliGRAM(s) Oral three times a day  atorvastatin 20milliGRAM(s) Oral at bedtime  fenofibrate Tablet 12milliGRAM(s) Oral daily  carvedilol 12.5milliGRAM(s) Oral every 12 hours  pantoprazole    Tablet 40milliGRAM(s) Oral before breakfast  mometasone 220 MICROgram(s) Inhaler 1Puff(s) Inhalation daily  artificial  tears Solution 1Drop(s) Both EYES every 12 hours  torsemide 20milliGRAM(s) Oral daily    MEDICATIONS  (PRN):  temazepam 15milliGRAM(s) Oral at bedtime PRN Insomnia  capsaicin 0.025% Cream 1Application(s) Topical three times a day PRN Pain  ALBUTerol    90 MICROgram(s) HFA Inhaler 2Puff(s) Inhalation every 6 hours PRN Shortness of Breath and/or Wheezing  acetaminophen   Tablet. 650milliGRAM(s) Oral every 6 hours PRN Mild Pain (1 - 3)         Vitals log        ICU Vital Signs Last 24 Hrs  T(C): 36.4, Max: 36.9 (06-01 @ 21:48)  T(F): 97.5, Max: 98.4 (06-01 @ 21:48)  HR: 60 (59 - 63)  BP: 121/63 (111/65 - 121/63)  BP(mean): --  ABP: --  ABP(mean): --  RR: 17 (17 - 59)  SpO2: 97% (95% - 97%)           Input and Output:  I&O's Detail  I & Os for 24h ending 01 Jun 2017 07:00  =============================================  IN:    sodium chloride 0.9%: 500 ml    Total IN: 500 ml  ---------------------------------------------  OUT:    Indwelling Catheter - Urethral: 2000 ml    Colostomy: 50 ml    Total OUT: 2050 ml  ---------------------------------------------  Total NET: -1550 ml    I & Os for current day (as of 02 Jun 2017 06:25)  =============================================  IN:    Oral Fluid: 240 ml    Total IN: 240 ml  ---------------------------------------------  OUT:    Indwelling Catheter - Urethral: 2700 ml    Colostomy: 600 ml    Total OUT: 3300 ml  ---------------------------------------------  Total NET: -3060 ml      Lab Data                        10.8   9.2   )-----------( 194      ( 01 Jun 2017 09:00 )             34.4     06-01    137  |  100  |  17  ----------------------------<  83  4.4   |  30  |  1.40<H>    Ca    9.6      01 Jun 2017 09:00    TPro  6.4  /  Alb  3.2<L>  /  TBili  0.7  /  DBili  x   /  AST  16  /  ALT  11<L>  /  AlkPhos  48  06-01            Review of Systems	      Objective     Physical Examination  head at  heart - s1s2  lungs - dec BS  abd - soft  cn grossly int        Pertinent Lab findings & Imaging      [place]:  NO   Adequate UO     I&O's Detail  I & Os for 24h ending 01 Jun 2017 07:00  =============================================  IN:    sodium chloride 0.9%: 500 ml    Total IN: 500 ml  ---------------------------------------------  OUT:    Indwelling Catheter - Urethral: 2000 ml    Colostomy: 50 ml    Total OUT: 2050 ml  ---------------------------------------------  Total NET: -1550 ml    I & Os for current day (as of 02 Jun 2017 06:25)  =============================================  IN:    Oral Fluid: 240 ml    Total IN: 240 ml  ---------------------------------------------  OUT:    Indwelling Catheter - Urethral: 2700 ml    Colostomy: 600 ml    Total OUT: 3300 ml  ---------------------------------------------  Total NET: -3060 ml           Discussed with:     Cultures:	        Radiology

## 2017-06-02 NOTE — PROGRESS NOTE ADULT - SUBJECTIVE AND OBJECTIVE BOX
PAST MEDICAL & SURGICAL HISTORY:  Oxygen dependent: wears 2LNC at HS  COPD (chronic obstructive pulmonary disease)  Ischemic bowel disease  Colon cancer  Automatic implantable cardioverter-defibrillator in situ  Acute MI  Afib  Hypertension  Congestive heart failure  Insomnia  Anemia, vitamin B12 deficiency  CAD (coronary artery disease)  HTN (hypertension)  Ischemic colitis, enteritis, or enterocolitis  Vitamin B 12 deficiency  Prostate cancer  Colon cancer  Afib  CKD (chronic kidney disease)  MI (myocardial infarction)  PAD (peripheral artery disease)  CAD (coronary artery disease)  Peripheral Neuropathy  HTN (hypertension)  COPD (chronic obstructive pulmonary disease)  Colostomy care  Cataract extraction status of eye, right  S/P colostomy  S/P colon resection  S/P cholecystectomy  S/P colostomy  S/P colon resection  S/P cardiac pacemaker procedure: AICD  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  S/P small bowel resection  S/P colon resection  S/P cholecystectomy  S/P bypass graft of extremity: Left to right femoral-femoral artery bypass graft 10 years ago  S/P CABG x 3      REVIEW OF SYSTEMS:    MEDICATIONS  (STANDING):  aspirin enteric coated 81milliGRAM(s) Oral daily  traMADol 50milliGRAM(s) Oral daily  lisinopril 10milliGRAM(s) Oral daily  tamsulosin 0.4milliGRAM(s) Oral at bedtime  isosorbide   mononitrate ER Tablet (IMDUR) 60milliGRAM(s) Oral daily  rivaroxaban 15milliGRAM(s) Oral daily  gabapentin 100milliGRAM(s) Oral three times a day  atorvastatin 20milliGRAM(s) Oral at bedtime  fenofibrate Tablet 12milliGRAM(s) Oral daily  carvedilol 12.5milliGRAM(s) Oral every 12 hours  pantoprazole    Tablet 40milliGRAM(s) Oral before breakfast  mometasone 220 MICROgram(s) Inhaler 1Puff(s) Inhalation daily  artificial  tears Solution 1Drop(s) Both EYES every 12 hours  torsemide 20milliGRAM(s) Oral daily    MEDICATIONS  (PRN):  temazepam 15milliGRAM(s) Oral at bedtime PRN Insomnia  capsaicin 0.025% Cream 1Application(s) Topical three times a day PRN Pain  ALBUTerol    90 MICROgram(s) HFA Inhaler 2Puff(s) Inhalation every 6 hours PRN Shortness of Breath and/or Wheezing  acetaminophen   Tablet. 650milliGRAM(s) Oral every 6 hours PRN Mild Pain (1 - 3)      PE:    Allergies    No Known Allergies    Intolerances        FAMILY HISTORY:  Family history of colon cancer (Father)  Family history of MI (myocardial infarction) (Father)  No pertinent family history in first degree relatives      Vital Signs Last 24 Hrs  T(C): 36.4, Max: 36.9 ( @ 21:48)  T(F): 97.5, Max: 98.4 ( @ 21:48)  HR: 60 (59 - 63)  BP: 121/63 (111/65 - 121/63)  BP(mean): --  RR: 17 (17 - 59)  SpO2: 97% (95% - 97%)    PHYSICAL EXAM:    LABS:                        11.0   9.7   )-----------( 174      ( 2017 06:12 )             33.8     06    139  |  102  |  20  ----------------------------<  97  4.6   |  31  |  1.50<H>    Ca    9.6      2017 06:12    TPro  6.4  /  Alb  3.2<L>  /  TBili  0.7  /  DBili  x   /  AST  16  /  ALT  11<L>  /  AlkPhos  48  06-01    PT/INR - ( 31 May 2017 13:29 )   PT: 20.6 sec;   INR: 1.87 ratio         PTT - ( 31 May 2017 13:29 )  PTT:41.5 sec  Urinalysis Basic - ( 31 May 2017 14:32 )    Color: Yellow / Appearance: Clear / S.005 / pH: x  Gluc: x / Ketone: Negative  / Bili: Negative / Urobili: Negative   Blood: x / Protein: Negative / Nitrite: Negative   Leuk Esterase: Negative / RBC: x / WBC x   Sq Epi: x / Non Sq Epi: x / Bacteria: x      Urine Culture:  @ 18:31  Urine Culure Resuls   No growth  Organism --      RADIOLOGY & ADDITIONAL STUDIES: most recent renal sono normal

## 2017-06-02 NOTE — PROGRESS NOTE ADULT - ASSESSMENT
Based on renal sono showing resolution of bilat hydro, ok to d/c alvarado and pt may resume CIC after discharge.  thank you, needs f/u our office in 2 wks

## 2017-06-02 NOTE — PROGRESS NOTE ADULT - PROBLEM SELECTOR PROBLEM 1
Urinary tract infection, recurrent
COPD (chronic obstructive pulmonary disease)
SHALA (obstructive sleep apnea)

## 2017-06-02 NOTE — PROGRESS NOTE ADULT - ASSESSMENT
83M with multiple medical problems admitted with dysuria  negative ua and urine cx makes it less likely that this is an active infection  Antibx stopped yesterday  no change in clinical status  continue off antibx  and follow  will see as needed  call with questions

## 2017-06-02 NOTE — PROGRESS NOTE ADULT - SUBJECTIVE AND OBJECTIVE BOX
[facility], Division of Infectious Diseases  CASTRO Wolfe A. Lee  [phone]    Name: LEANDRO BEAL  Age: 83y  Gender: Male  MRN: [id number]    Interval History--  Notes reviewed better, no dysuria. has alvarado catheter     Past Medical History--  Oxygen dependent  COPD (chronic obstructive pulmonary disease)  Ischemic bowel disease  Colon cancer  Automatic implantable cardioverter-defibrillator in situ  Acute MI  Afib  Hypertension  Congestive heart failure  Insomnia  Anemia, vitamin B12 deficiency  CAD (coronary artery disease)  HTN (hypertension)  Ischemic colitis, enteritis, or enterocolitis  Vitamin B 12 deficiency  Prostate cancer  Colon cancer  Afib  CKD (chronic kidney disease)  MI (myocardial infarction)  PAD (peripheral artery disease)  CAD (coronary artery disease)  Peripheral Neuropathy  HTN (hypertension)  COPD (chronic obstructive pulmonary disease)  Colostomy care  Cataract extraction status of eye, right  S/P colostomy  S/P colon resection  S/P cholecystectomy  S/P colostomy  S/P colon resection  S/P cardiac pacemaker procedure  S/P amputation  S/P small bowel resection  S/P colon resection  S/P cholecystectomy  S/P bypass graft of extremity  S/P CABG x 3      For details regarding the patient's social history, family history, and other miscellaneous elements, please refer the initial infectious diseases consultation and/or the admitting history and physical examination for this admission.    Allergies    No Known Allergies    Intolerances        Medications--  Antibiotics:    Other:  aspirin enteric coated  traMADol  lisinopril  tamsulosin  isosorbide   mononitrate ER Tablet (IMDUR)  rivaroxaban  gabapentin  atorvastatin  fenofibrate Tablet  temazepam PRN  carvedilol  capsaicin 0.025% Cream PRN  pantoprazole    Tablet  mometasone 220 MICROgram(s) Inhaler  ALBUTerol    90 MICROgram(s) HFA Inhaler PRN  acetaminophen   Tablet. PRN  artificial  tears Solution  torsemide      Review of Systems--  A 10-point review of systems was obtained.     Pertinent positives and negatives--  Constitutional: No fevers. No Chills. No Rigors.   Cardiovascular: No chest pain. No palpitations.  Respiratory: No shortness of breath. No cough.  Gastrointestinal: No nausea or vomiting. No diarrhea or constipation.   Psychiatric: Pleasant. Appropriate affect.    Review of systems otherwise negative except as previously noted.    Physical Examination--  General: Nontoxic-appearing Male in no acute distress.  Vital Signs: T(F): 97.5, Max: 98.4 (06-01 @ 21:48)  HR: 60  BP: 121/63  RR: 17  SpO2: 97%  Wt(kg): --  HEENT: AT/NC. left eye conjunctival effusion Oropharynx clear. Dentition fair.  Neck: Not rigid. No sense of mass.  Nodes: None palpable.  Lungs: Clear bilaterally without rales, wheezing or rhonchi decreased bs  Heart: Regular rate and rhythm. No Murmur. No rub. No gallop. No palpable thrill.  Abdomen: Bowel sounds present and normoactive. Soft. Nondistended. Nontender.   Back: No spinal tenderness. No costovertebral angle tenderness.   Extremities: No cyanosis or clubbing. No edema. .  Psychiatric: Appropriate affect and mood for situation.   [facility]        Laboratory Studies--  CBC                        11.0   9.7   )-----------( 174      ( 02 Jun 2017 06:12 )             33.8       Chemistries  06-02    139  |  102  |  20  ----------------------------<  97  4.6   |  31  |  1.50<H>    Ca    9.6      02 Jun 2017 06:12    TPro  6.4  /  Alb  3.2<L>  /  TBili  0.7  /  DBili  x   /  AST  16  /  ALT  11<L>  /  AlkPhos  48  06-01      Culture Data  .Urine Clean Catch (Midstream)  05-31 @ 18:31   No growth  --  --      .Blood Blood  05-31 @ 18:17   No growth to date.  --  --

## 2017-06-05 LAB
CULTURE RESULTS: SIGNIFICANT CHANGE UP
CULTURE RESULTS: SIGNIFICANT CHANGE UP
SPECIMEN SOURCE: SIGNIFICANT CHANGE UP
SPECIMEN SOURCE: SIGNIFICANT CHANGE UP

## 2017-06-06 DIAGNOSIS — N17.9 ACUTE KIDNEY FAILURE, UNSPECIFIED: ICD-10-CM

## 2017-06-06 DIAGNOSIS — N39.0 URINARY TRACT INFECTION, SITE NOT SPECIFIED: ICD-10-CM

## 2017-06-06 DIAGNOSIS — I10 ESSENTIAL (PRIMARY) HYPERTENSION: ICD-10-CM

## 2017-06-06 DIAGNOSIS — I25.10 ATHEROSCLEROTIC HEART DISEASE OF NATIVE CORONARY ARTERY WITHOUT ANGINA PECTORIS: ICD-10-CM

## 2017-06-06 DIAGNOSIS — I50.9 HEART FAILURE, UNSPECIFIED: ICD-10-CM

## 2017-06-06 DIAGNOSIS — Z85.46 PERSONAL HISTORY OF MALIGNANT NEOPLASM OF PROSTATE: ICD-10-CM

## 2017-06-06 DIAGNOSIS — J44.9 CHRONIC OBSTRUCTIVE PULMONARY DISEASE, UNSPECIFIED: ICD-10-CM

## 2017-06-06 DIAGNOSIS — I48.91 UNSPECIFIED ATRIAL FIBRILLATION: ICD-10-CM

## 2017-06-06 DIAGNOSIS — I73.9 PERIPHERAL VASCULAR DISEASE, UNSPECIFIED: ICD-10-CM

## 2017-06-06 DIAGNOSIS — G47.33 OBSTRUCTIVE SLEEP APNEA (ADULT) (PEDIATRIC): ICD-10-CM

## 2017-06-06 DIAGNOSIS — Z85.038 PERSONAL HISTORY OF OTHER MALIGNANT NEOPLASM OF LARGE INTESTINE: ICD-10-CM

## 2017-08-21 ENCOUNTER — APPOINTMENT (OUTPATIENT)
Dept: VASCULAR SURGERY | Facility: CLINIC | Age: 82
End: 2017-08-21
Payer: MEDICARE

## 2017-08-21 PROCEDURE — 93926 LOWER EXTREMITY STUDY: CPT

## 2017-09-21 ENCOUNTER — EMERGENCY (EMERGENCY)
Facility: HOSPITAL | Age: 82
LOS: 1 days | Discharge: HOME | End: 2017-09-21
Attending: EMERGENCY MEDICINE | Admitting: EMERGENCY MEDICINE
Payer: COMMERCIAL

## 2017-09-21 VITALS
HEART RATE: 82 BPM | OXYGEN SATURATION: 97 % | HEIGHT: 72 IN | DIASTOLIC BLOOD PRESSURE: 94 MMHG | WEIGHT: 190.04 LBS | SYSTOLIC BLOOD PRESSURE: 147 MMHG | TEMPERATURE: 98 F | RESPIRATION RATE: 16 BRPM

## 2017-09-21 VITALS
RESPIRATION RATE: 16 BRPM | TEMPERATURE: 98 F | DIASTOLIC BLOOD PRESSURE: 65 MMHG | SYSTOLIC BLOOD PRESSURE: 135 MMHG | HEART RATE: 60 BPM | OXYGEN SATURATION: 95 %

## 2017-09-21 DIAGNOSIS — M79.605 PAIN IN LEFT LEG: ICD-10-CM

## 2017-09-21 DIAGNOSIS — I48.91 UNSPECIFIED ATRIAL FIBRILLATION: ICD-10-CM

## 2017-09-21 DIAGNOSIS — I13.0 HYPERTENSIVE HEART AND CHRONIC KIDNEY DISEASE WITH HEART FAILURE AND STAGE 1 THROUGH STAGE 4 CHRONIC KIDNEY DISEASE, OR UNSPECIFIED CHRONIC KIDNEY DISEASE: ICD-10-CM

## 2017-09-21 DIAGNOSIS — J44.9 CHRONIC OBSTRUCTIVE PULMONARY DISEASE, UNSPECIFIED: ICD-10-CM

## 2017-09-21 DIAGNOSIS — I25.10 ATHEROSCLEROTIC HEART DISEASE OF NATIVE CORONARY ARTERY WITHOUT ANGINA PECTORIS: ICD-10-CM

## 2017-09-21 DIAGNOSIS — I12.9 HYPERTENSIVE CHRONIC KIDNEY DISEASE WITH STAGE 1 THROUGH STAGE 4 CHRONIC KIDNEY DISEASE, OR UNSPECIFIED CHRONIC KIDNEY DISEASE: ICD-10-CM

## 2017-09-21 DIAGNOSIS — N18.9 CHRONIC KIDNEY DISEASE, UNSPECIFIED: ICD-10-CM

## 2017-09-21 DIAGNOSIS — Z85.038 PERSONAL HISTORY OF OTHER MALIGNANT NEOPLASM OF LARGE INTESTINE: ICD-10-CM

## 2017-09-21 DIAGNOSIS — Z43.3 ENCOUNTER FOR ATTENTION TO COLOSTOMY: Chronic | ICD-10-CM

## 2017-09-21 LAB
ALBUMIN SERPL ELPH-MCNC: 3.4 G/DL — SIGNIFICANT CHANGE UP (ref 3.3–5)
ALP SERPL-CCNC: 50 U/L — SIGNIFICANT CHANGE UP (ref 40–120)
ALT FLD-CCNC: 11 U/L — LOW (ref 12–78)
ANION GAP SERPL CALC-SCNC: 8 MMOL/L — SIGNIFICANT CHANGE UP (ref 5–17)
APPEARANCE UR: CLEAR — SIGNIFICANT CHANGE UP
APTT BLD: 38.8 SEC — HIGH (ref 27.5–37.4)
AST SERPL-CCNC: 17 U/L — SIGNIFICANT CHANGE UP (ref 15–37)
BASOPHILS # BLD AUTO: 0.1 K/UL — SIGNIFICANT CHANGE UP (ref 0–0.2)
BASOPHILS NFR BLD AUTO: 0.8 % — SIGNIFICANT CHANGE UP (ref 0–2)
BILIRUB SERPL-MCNC: 0.5 MG/DL — SIGNIFICANT CHANGE UP (ref 0.2–1.2)
BILIRUB UR-MCNC: NEGATIVE — SIGNIFICANT CHANGE UP
BUN SERPL-MCNC: 20 MG/DL — SIGNIFICANT CHANGE UP (ref 7–23)
CALCIUM SERPL-MCNC: 9.5 MG/DL — SIGNIFICANT CHANGE UP (ref 8.5–10.1)
CHLORIDE SERPL-SCNC: 100 MMOL/L — SIGNIFICANT CHANGE UP (ref 96–108)
CO2 SERPL-SCNC: 28 MMOL/L — SIGNIFICANT CHANGE UP (ref 22–31)
COLOR SPEC: YELLOW — SIGNIFICANT CHANGE UP
CREAT SERPL-MCNC: 1.3 MG/DL — SIGNIFICANT CHANGE UP (ref 0.5–1.3)
DIFF PNL FLD: ABNORMAL
EOSINOPHIL # BLD AUTO: 0.5 K/UL — SIGNIFICANT CHANGE UP (ref 0–0.5)
EOSINOPHIL NFR BLD AUTO: 6.2 % — HIGH (ref 0–6)
GLUCOSE SERPL-MCNC: 96 MG/DL — SIGNIFICANT CHANGE UP (ref 70–99)
GLUCOSE UR QL: NEGATIVE — SIGNIFICANT CHANGE UP
HCT VFR BLD CALC: 33.7 % — LOW (ref 39–50)
HGB BLD-MCNC: 11.2 G/DL — LOW (ref 13–17)
INR BLD: 1.64 RATIO — HIGH (ref 0.88–1.16)
KETONES UR-MCNC: NEGATIVE — SIGNIFICANT CHANGE UP
LEUKOCYTE ESTERASE UR-ACNC: ABNORMAL
LYMPHOCYTES # BLD AUTO: 1.2 K/UL — SIGNIFICANT CHANGE UP (ref 1–3.3)
LYMPHOCYTES # BLD AUTO: 13.7 % — SIGNIFICANT CHANGE UP (ref 13–44)
MCHC RBC-ENTMCNC: 31.9 PG — SIGNIFICANT CHANGE UP (ref 27–34)
MCHC RBC-ENTMCNC: 33.3 GM/DL — SIGNIFICANT CHANGE UP (ref 32–36)
MCV RBC AUTO: 95.9 FL — SIGNIFICANT CHANGE UP (ref 80–100)
MONOCYTES # BLD AUTO: 1.1 K/UL — HIGH (ref 0–0.9)
MONOCYTES NFR BLD AUTO: 12.2 % — HIGH (ref 1–9)
NEUTROPHILS # BLD AUTO: 5.8 K/UL — SIGNIFICANT CHANGE UP (ref 1.8–7.4)
NEUTROPHILS NFR BLD AUTO: 67.2 % — SIGNIFICANT CHANGE UP (ref 43–77)
NITRITE UR-MCNC: NEGATIVE — SIGNIFICANT CHANGE UP
PH UR: 7 — SIGNIFICANT CHANGE UP (ref 5–8)
PLATELET # BLD AUTO: 212 K/UL — SIGNIFICANT CHANGE UP (ref 150–400)
POTASSIUM SERPL-MCNC: 4.4 MMOL/L — SIGNIFICANT CHANGE UP (ref 3.5–5.3)
POTASSIUM SERPL-SCNC: 4.4 MMOL/L — SIGNIFICANT CHANGE UP (ref 3.5–5.3)
PROT SERPL-MCNC: 7 G/DL — SIGNIFICANT CHANGE UP (ref 6–8.3)
PROT UR-MCNC: NEGATIVE — SIGNIFICANT CHANGE UP
PROTHROM AB SERPL-ACNC: 18.1 SEC — HIGH (ref 9.8–12.7)
RBC # BLD: 3.51 M/UL — LOW (ref 4.2–5.8)
RBC # FLD: 15.6 % — HIGH (ref 10.3–14.5)
SODIUM SERPL-SCNC: 136 MMOL/L — SIGNIFICANT CHANGE UP (ref 135–145)
SP GR SPEC: 1 — LOW (ref 1.01–1.02)
UROBILINOGEN FLD QL: NEGATIVE — SIGNIFICANT CHANGE UP
WBC # BLD: 8.7 K/UL — SIGNIFICANT CHANGE UP (ref 3.8–10.5)
WBC # FLD AUTO: 8.7 K/UL — SIGNIFICANT CHANGE UP (ref 3.8–10.5)

## 2017-09-21 PROCEDURE — 90471 IMMUNIZATION ADMIN: CPT

## 2017-09-21 PROCEDURE — 87086 URINE CULTURE/COLONY COUNT: CPT

## 2017-09-21 PROCEDURE — 99284 EMERGENCY DEPT VISIT MOD MDM: CPT | Mod: 25

## 2017-09-21 PROCEDURE — 93925 LOWER EXTREMITY STUDY: CPT | Mod: 26

## 2017-09-21 PROCEDURE — 85730 THROMBOPLASTIN TIME PARTIAL: CPT

## 2017-09-21 PROCEDURE — 80053 COMPREHEN METABOLIC PANEL: CPT

## 2017-09-21 PROCEDURE — 36000 PLACE NEEDLE IN VEIN: CPT

## 2017-09-21 PROCEDURE — 93970 EXTREMITY STUDY: CPT

## 2017-09-21 PROCEDURE — 85610 PROTHROMBIN TIME: CPT

## 2017-09-21 PROCEDURE — 90715 TDAP VACCINE 7 YRS/> IM: CPT

## 2017-09-21 PROCEDURE — 93925 LOWER EXTREMITY STUDY: CPT

## 2017-09-21 PROCEDURE — 81001 URINALYSIS AUTO W/SCOPE: CPT

## 2017-09-21 PROCEDURE — 99285 EMERGENCY DEPT VISIT HI MDM: CPT

## 2017-09-21 PROCEDURE — 93970 EXTREMITY STUDY: CPT | Mod: 26

## 2017-09-21 PROCEDURE — 85027 COMPLETE CBC AUTOMATED: CPT

## 2017-09-21 RX ORDER — TETANUS TOXOID, REDUCED DIPHTHERIA TOXOID AND ACELLULAR PERTUSSIS VACCINE, ADSORBED 5; 2.5; 8; 8; 2.5 [IU]/.5ML; [IU]/.5ML; UG/.5ML; UG/.5ML; UG/.5ML
0.5 SUSPENSION INTRAMUSCULAR ONCE
Qty: 0 | Refills: 0 | Status: COMPLETED | OUTPATIENT
Start: 2017-09-21 | End: 2017-09-21

## 2017-09-21 RX ADMIN — TETANUS TOXOID, REDUCED DIPHTHERIA TOXOID AND ACELLULAR PERTUSSIS VACCINE, ADSORBED 0.5 MILLILITER(S): 5; 2.5; 8; 8; 2.5 SUSPENSION INTRAMUSCULAR at 14:28

## 2017-09-21 NOTE — CONSULT NOTE ADULT - ASSESSMENT
83 y.o. male with chronic PAD in left> right legs had discomfort in his left calf that has resolved. There is no evidence for limb threatening ischemia. Venous doppler is negative for DVT. From a vascular standpoint the patient is stable for discharge. No vascular interventions are needed. The PATIENT WILL FOLLOW UP WITH HIS PRIMARY VASCULAR SURGEON (DR. ramos) AS A OUTPATIENT.

## 2017-09-21 NOTE — ED ADULT NURSE NOTE - OBJECTIVE STATEMENT
pt reporting left ankle and left calf pain for 1 week now, hx of PVD. This pain also started when he developed laceration on LL leg. scab noted with some redness surrounding. denies fever, chills, heat. pedal pulses weak, skin warm to the touch, cap refill wnl

## 2017-09-21 NOTE — ED PROVIDER NOTE - OBJECTIVE STATEMENT
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  No fever/chills.  Wife mentioned an abrasion a few days ago to the left lateral side of his calf. Tetanus not up to date.

## 2017-09-21 NOTE — CONSULT NOTE ADULT - SUBJECTIVE AND OBJECTIVE BOX
History of Present Illness:  83y Male with a history of PAD and multiple medical issues presents with sudden onset of pain along inner left calf and heel today. He has a hx. of PAD and had a femoral-femoral bypass 10 years ago and prior stents in his right leg. He denies ischemic pedal rest pain ,digital ulcers or disabling claudication in either leg. I was requested to evaluate his LE circulation. Venous doppler is negative for DVT and arterial doppler showed chronic PAD of LEFT > RIGHT LEGS.    PAST MEDICAL & SURGICAL HISTORY:  Oxygen dependent: wears 2LNC at HS  COPD (chronic obstructive pulmonary disease)  Ischemic bowel disease  Colon cancer  Automatic implantable cardioverter-defibrillator in situ  Acute MI  Hypertension  Congestive heart failure  Insomnia  Anemia, vitamin B12 deficiency  HTN (hypertension)  Ischemic colitis, enteritis, or enterocolitis  Vitamin B 12 deficiency  Prostate cancer  Afib  CKD (chronic kidney disease)  MI (myocardial infarction)  PAD (peripheral artery disease)  CAD (coronary artery disease)  Peripheral Neuropathy  COPD (chronic obstructive pulmonary disease)  Colostomy care  Cataract extraction status of eye, right  S/P colostomy  S/P cardiac pacemaker procedure: AICD  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  S/P small bowel resection  S/P colon resection  S/P cholecystectomy  S/P bypass graft of extremity: Left to right femoral-femoral artery bypass graft 10 years ago  S/P CABG x 3      Allergies    No Known Allergies    Intolerances        MEDICATIONS  (STANDING):    MEDICATIONS  (PRN):      Social History:  Smoking History: denies  Alcohol Use: social    REVIEW OF SYSTEMS:  CONSTITUTIONAL: No weakness, fevers or chills  EYES/ENT: No visual changes;  No vertigo or throat pain   NECK: No pain or stiffness  RESPIRATORY: No cough, wheezing, hemoptysis; +++ shortness of breath  CARDIOVASCULAR: No chest pain or palpitations  GASTROINTESTINAL: No abdominal or epigastric pain. No nausea, vomiting, or hematemesis; No diarrhea or constipation. No melena or hematochezia.  GENITOURINARY: No dysuria, frequency or hematuria  NEUROLOGICAL: No numbness or weakness  SKIN: No itching, burning, rashes, or lesions   Vascular:  No lower extremity claudication, pedal rest pain or digital ulcers  All other review of systems is negative unless indicated above.    PHYSICAL EXAM:  General:  On exam, the patient is alert nontoxic appearing Male in no acute distress.  Vital Signs Last 24 Hrs  T(C): 36.7 (21 Sep 2017 10:26), Max: 36.7 (21 Sep 2017 10:26)  T(F): 98 (21 Sep 2017 10:26), Max: 98 (21 Sep 2017 10:26)  HR: 82 (21 Sep 2017 10:26) (82 - 82)  BP: 147/94 (21 Sep 2017 10:26) (147/94 - 147/94)  BP(mean): --  RR: 16 (21 Sep 2017 10:26) (16 - 16)  SpO2: 97% (21 Sep 2017 10:26) (97% - 97%)    Neck:  4+/4+ bilateral carotid pulses; no carotid bruit, no palpable cervical masses.  Heart:  Regular, no murmurs, rubs or gallops.    Lungs:  Decreased BS at both bases  Chest:  No chest wall deformities  Symmetrical chest expansion.   Abdomen: Soft and nontender.  No palpable masses.  No rebound, guarding or rigidity.  Extremities:  Feet are warm, pink with normal capillary refill times.  There are no digital ulcers or heel decubiti.  The calf and thigh muscles are soft and nontender.  There are no palpable cords or limb cellulitis.  Lata's sign  is negative bilaterally.  There is no lower extremity edema, cyanosis, or rubor.  On examination of the peripheral pulses:  Left leg femoral pulse is  3/4  , popliteal pulse is  2/4  ,PT Pulse is 0   , DP Pulse is 2/4   Right leg femoral pulse is  4/4  ,popliteal pulse is  3/4  , PT Pulse is 2/4   , DP Pulse is2/4   excellent doppler signal over b/l DP vessels  Neurological:  There are no motor  deficits in either lower extremity. There is decreased vibratory sensation in both feet c/w neuropathy                          11.2   8.7   )-----------( 212      ( 21 Sep 2017 11:21 )             33.7     09-21    136  |  100  |  20  ----------------------------<  96  4.4   |  28  |  1.30    Ca    9.5      21 Sep 2017 11:21    TPro  7.0  /  Alb  3.4  /  TBili  0.5  /  DBili  x   /  AST  17  /  ALT  11<L>  /  AlkPhos  50  09-21        PT/INR - ( 21 Sep 2017 11:21 )   PT: 18.1 sec;   INR: 1.64 ratio         PTT - ( 21 Sep 2017 11:21 )  PTT:38.8 sec    Radiology:

## 2017-09-21 NOTE — ED ADULT NURSE NOTE - PMH
Acute MI    Afib    Anemia, vitamin B12 deficiency    Automatic implantable cardioverter-defibrillator in situ    CAD (coronary artery disease)    CKD (chronic kidney disease)    Colon cancer    Congestive heart failure    COPD (chronic obstructive pulmonary disease)    COPD (chronic obstructive pulmonary disease)    HTN (hypertension)    Hypertension    Insomnia    Ischemic bowel disease    Ischemic colitis, enteritis, or enterocolitis    MI (myocardial infarction)    Oxygen dependent  wears 2LNC at HS  PAD (peripheral artery disease)    Peripheral Neuropathy    Prostate cancer    Vitamin B 12 deficiency

## 2017-09-21 NOTE — ED PROVIDER NOTE - PROGRESS NOTE DETAILS
seen by Dr. Hui who reviewed US arterial duplex, +dopplerable dp pulses, recommend d/c f/u with outpatient vascular surgeon Dr. Reddy

## 2017-09-22 LAB
CULTURE RESULTS: SIGNIFICANT CHANGE UP
SPECIMEN SOURCE: SIGNIFICANT CHANGE UP

## 2017-11-29 ENCOUNTER — APPOINTMENT (OUTPATIENT)
Dept: ORTHOPEDIC SURGERY | Facility: CLINIC | Age: 82
End: 2017-11-29
Payer: MEDICARE

## 2017-11-29 PROCEDURE — 99202 OFFICE O/P NEW SF 15 MIN: CPT

## 2017-12-02 ENCOUNTER — OUTPATIENT (OUTPATIENT)
Dept: OUTPATIENT SERVICES | Facility: HOSPITAL | Age: 82
LOS: 1 days | End: 2017-12-02
Payer: COMMERCIAL

## 2017-12-02 VITALS
HEART RATE: 67 BPM | HEIGHT: 72 IN | DIASTOLIC BLOOD PRESSURE: 65 MMHG | WEIGHT: 195.99 LBS | TEMPERATURE: 98 F | OXYGEN SATURATION: 98 % | RESPIRATION RATE: 18 BRPM | SYSTOLIC BLOOD PRESSURE: 135 MMHG

## 2017-12-02 DIAGNOSIS — R07.9 CHEST PAIN, UNSPECIFIED: ICD-10-CM

## 2017-12-02 DIAGNOSIS — I50.20 UNSPECIFIED SYSTOLIC (CONGESTIVE) HEART FAILURE: ICD-10-CM

## 2017-12-02 DIAGNOSIS — Z43.3 ENCOUNTER FOR ATTENTION TO COLOSTOMY: Chronic | ICD-10-CM

## 2017-12-02 LAB
ALBUMIN SERPL ELPH-MCNC: 4 G/DL — SIGNIFICANT CHANGE UP (ref 3.3–5)
ALP SERPL-CCNC: 53 U/L — SIGNIFICANT CHANGE UP (ref 40–120)
ALT FLD-CCNC: 14 U/L RC — SIGNIFICANT CHANGE UP (ref 10–45)
ANION GAP SERPL CALC-SCNC: 14 MMOL/L — SIGNIFICANT CHANGE UP (ref 5–17)
AST SERPL-CCNC: 42 U/L — HIGH (ref 10–40)
BILIRUB SERPL-MCNC: 0.5 MG/DL — SIGNIFICANT CHANGE UP (ref 0.2–1.2)
BUN SERPL-MCNC: 28 MG/DL — HIGH (ref 7–23)
CALCIUM SERPL-MCNC: 10.1 MG/DL — SIGNIFICANT CHANGE UP (ref 8.4–10.5)
CHLORIDE SERPL-SCNC: 101 MMOL/L — SIGNIFICANT CHANGE UP (ref 96–108)
CO2 SERPL-SCNC: 23 MMOL/L — SIGNIFICANT CHANGE UP (ref 22–31)
CREAT SERPL-MCNC: 1.58 MG/DL — HIGH (ref 0.5–1.3)
GLUCOSE SERPL-MCNC: 98 MG/DL — SIGNIFICANT CHANGE UP (ref 70–99)
HCT VFR BLD CALC: 33.9 % — LOW (ref 39–50)
HGB BLD-MCNC: 10.5 G/DL — LOW (ref 13–17)
MCHC RBC-ENTMCNC: 30.6 PG — SIGNIFICANT CHANGE UP (ref 27–34)
MCHC RBC-ENTMCNC: 30.8 GM/DL — LOW (ref 32–36)
MCV RBC AUTO: 99.1 FL — SIGNIFICANT CHANGE UP (ref 80–100)
PLATELET # BLD AUTO: 184 K/UL — SIGNIFICANT CHANGE UP (ref 150–400)
POTASSIUM SERPL-MCNC: 4.6 MMOL/L — SIGNIFICANT CHANGE UP (ref 3.5–5.3)
POTASSIUM SERPL-SCNC: 4.6 MMOL/L — SIGNIFICANT CHANGE UP (ref 3.5–5.3)
PROT SERPL-MCNC: 7.4 G/DL — SIGNIFICANT CHANGE UP (ref 6–8.3)
RBC # BLD: 3.42 M/UL — LOW (ref 4.2–5.8)
RBC # FLD: 14.5 % — SIGNIFICANT CHANGE UP (ref 10.3–14.5)
SODIUM SERPL-SCNC: 138 MMOL/L — SIGNIFICANT CHANGE UP (ref 135–145)
WBC # BLD: 10 K/UL — SIGNIFICANT CHANGE UP (ref 3.8–10.5)
WBC # FLD AUTO: 10 K/UL — SIGNIFICANT CHANGE UP (ref 3.8–10.5)

## 2017-12-02 PROCEDURE — C1887: CPT

## 2017-12-02 PROCEDURE — C1894: CPT

## 2017-12-02 PROCEDURE — C1769: CPT

## 2017-12-02 PROCEDURE — 93010 ELECTROCARDIOGRAM REPORT: CPT

## 2017-12-02 PROCEDURE — 93459 L HRT ART/GRFT ANGIO: CPT | Mod: 26

## 2017-12-02 PROCEDURE — 85027 COMPLETE CBC AUTOMATED: CPT

## 2017-12-02 PROCEDURE — 93459 L HRT ART/GRFT ANGIO: CPT

## 2017-12-02 PROCEDURE — 80053 COMPREHEN METABOLIC PANEL: CPT

## 2017-12-02 PROCEDURE — 93005 ELECTROCARDIOGRAM TRACING: CPT

## 2017-12-02 RX ORDER — FENOFIBRATE,MICRONIZED 130 MG
0.25 CAPSULE ORAL
Qty: 0 | Refills: 0 | COMMUNITY

## 2017-12-02 RX ORDER — ATORVASTATIN CALCIUM 80 MG/1
1 TABLET, FILM COATED ORAL
Qty: 0 | Refills: 0 | COMMUNITY

## 2017-12-02 RX ORDER — MOMETASONE FUROATE 220 UG/1
1 INHALANT RESPIRATORY (INHALATION)
Qty: 0 | Refills: 0 | COMMUNITY

## 2017-12-02 NOTE — H&P CARDIOLOGY - ADDITIONAL PE
Bo[place] 2  Left anterior chest wall s/p AICD replacement 11/2017 site with soft swelling over site.  Pt states he had NST and raised arm over head for scan and subsequently site became swollen.  Site without pain, denies fever.

## 2017-12-02 NOTE — H&P CARDIOLOGY - HISTORY OF PRESENT ILLNESS
84 yo  Male with PMHx of recurrent UTIs and retention with self catheterization 3-4 times/day, CHF (LVEF 30% in Aug 2016) s/p AICD 2012 replaced at [facility] 11/2017 NYHA III, ventricular tachycardia, syncope with collapse, HTN, HLD, PVD right iliac disease, MI s/p CABG 3V 2003,  A-fib on Xarelto last dose 11/29/17,  prostate CA s/p radiation (2004), colon CA s/p chemo (2004) and resection with colostomy, HTN, HLD, TIA, PVD s/p right foot amputation of toes 1-3, COPD/ former smoker (40 pack years) and [place]  well managed on Mometasone,  left hip fx repair, CKD  who presented to cardiology. Dr. Moore, with episode of nonradiating chest pain that woke him from sleep relieved with belching after approx 5 minutes.  Pt. denied dizziness, diaphoresis, palpitations, SOB/TRUJILLO, nausea, vomiting, weight gain, or syncope (no syncope since ICD placement).  Pt. does endorse peripheral edema and 2 pound weight gain over past 2 days. NST approx 1 week ago results unavailable.  Pt. presents today asymptomatic for further evaluation and cardiac cath.

## 2017-12-11 ENCOUNTER — APPOINTMENT (OUTPATIENT)
Dept: VASCULAR SURGERY | Facility: CLINIC | Age: 82
End: 2017-12-11

## 2018-01-24 ENCOUNTER — INPATIENT (INPATIENT)
Facility: HOSPITAL | Age: 83
LOS: 1 days | Discharge: HOME | DRG: 191 | End: 2018-01-26
Attending: INTERNAL MEDICINE | Admitting: HOSPITALIST
Payer: COMMERCIAL

## 2018-01-24 VITALS
RESPIRATION RATE: 16 BRPM | HEART RATE: 84 BPM | SYSTOLIC BLOOD PRESSURE: 147 MMHG | TEMPERATURE: 100 F | OXYGEN SATURATION: 96 % | DIASTOLIC BLOOD PRESSURE: 83 MMHG | WEIGHT: 195.99 LBS

## 2018-01-24 DIAGNOSIS — Z43.3 ENCOUNTER FOR ATTENTION TO COLOSTOMY: Chronic | ICD-10-CM

## 2018-01-24 NOTE — ED ADULT TRIAGE NOTE - ARRIVAL INFO ADDITIONAL COMMENTS
patient took 1 regular dose aspirin at home, Patient has an IV #18 on the left abntecubital inserted by ems

## 2018-01-24 NOTE — ED ADULT NURSE NOTE - OBJECTIVE STATEMENT
received pt with thang/jh padilla pt evaluated and ekg done and reviewed by MD blood work sent to lab xray done

## 2018-01-25 DIAGNOSIS — D72.829 ELEVATED WHITE BLOOD CELL COUNT, UNSPECIFIED: ICD-10-CM

## 2018-01-25 DIAGNOSIS — C61 MALIGNANT NEOPLASM OF PROSTATE: ICD-10-CM

## 2018-01-25 DIAGNOSIS — R00.2 PALPITATIONS: ICD-10-CM

## 2018-01-25 DIAGNOSIS — N18.9 CHRONIC KIDNEY DISEASE, UNSPECIFIED: ICD-10-CM

## 2018-01-25 DIAGNOSIS — I48.91 UNSPECIFIED ATRIAL FIBRILLATION: ICD-10-CM

## 2018-01-25 DIAGNOSIS — I10 ESSENTIAL (PRIMARY) HYPERTENSION: ICD-10-CM

## 2018-01-25 DIAGNOSIS — Z29.9 ENCOUNTER FOR PROPHYLACTIC MEASURES, UNSPECIFIED: ICD-10-CM

## 2018-01-25 DIAGNOSIS — J44.9 CHRONIC OBSTRUCTIVE PULMONARY DISEASE, UNSPECIFIED: ICD-10-CM

## 2018-01-25 DIAGNOSIS — R06.02 SHORTNESS OF BREATH: ICD-10-CM

## 2018-01-25 DIAGNOSIS — J18.9 PNEUMONIA, UNSPECIFIED ORGANISM: ICD-10-CM

## 2018-01-25 LAB
ALBUMIN SERPL ELPH-MCNC: 3.3 G/DL — SIGNIFICANT CHANGE UP (ref 3.3–5)
ALP SERPL-CCNC: 56 U/L — SIGNIFICANT CHANGE UP (ref 40–120)
ALT FLD-CCNC: 15 U/L — SIGNIFICANT CHANGE UP (ref 12–78)
ANION GAP SERPL CALC-SCNC: 9 MMOL/L — SIGNIFICANT CHANGE UP (ref 5–17)
APPEARANCE UR: ABNORMAL
APTT BLD: 32.2 SEC — SIGNIFICANT CHANGE UP (ref 27.5–37.4)
AST SERPL-CCNC: 20 U/L — SIGNIFICANT CHANGE UP (ref 15–37)
BACTERIA # UR AUTO: ABNORMAL
BASOPHILS # BLD AUTO: 0.1 K/UL — SIGNIFICANT CHANGE UP (ref 0–0.2)
BASOPHILS NFR BLD AUTO: 0.5 % — SIGNIFICANT CHANGE UP (ref 0–2)
BILIRUB SERPL-MCNC: 0.5 MG/DL — SIGNIFICANT CHANGE UP (ref 0.2–1.2)
BILIRUB UR-MCNC: NEGATIVE — SIGNIFICANT CHANGE UP
BUN SERPL-MCNC: 24 MG/DL — HIGH (ref 7–23)
CALCIUM SERPL-MCNC: 9.3 MG/DL — SIGNIFICANT CHANGE UP (ref 8.5–10.1)
CHLORIDE SERPL-SCNC: 105 MMOL/L — SIGNIFICANT CHANGE UP (ref 96–108)
CK MB BLD-MCNC: 2.3 % — SIGNIFICANT CHANGE UP (ref 0–3.5)
CK MB BLD-MCNC: 3.3 % — SIGNIFICANT CHANGE UP (ref 0–3.5)
CK MB CFR SERPL CALC: 1.4 NG/ML — SIGNIFICANT CHANGE UP (ref 0–3.6)
CK MB CFR SERPL CALC: 1.8 NG/ML — SIGNIFICANT CHANGE UP (ref 0–3.6)
CK SERPL-CCNC: 55 U/L — SIGNIFICANT CHANGE UP (ref 26–308)
CK SERPL-CCNC: 61 U/L — SIGNIFICANT CHANGE UP (ref 26–308)
CO2 SERPL-SCNC: 26 MMOL/L — SIGNIFICANT CHANGE UP (ref 22–31)
COLOR SPEC: YELLOW — SIGNIFICANT CHANGE UP
CREAT SERPL-MCNC: 1.7 MG/DL — HIGH (ref 0.5–1.3)
DIFF PNL FLD: ABNORMAL
EOSINOPHIL # BLD AUTO: 0.5 K/UL — SIGNIFICANT CHANGE UP (ref 0–0.5)
EOSINOPHIL NFR BLD AUTO: 2.7 % — SIGNIFICANT CHANGE UP (ref 0–6)
GLUCOSE SERPL-MCNC: 126 MG/DL — HIGH (ref 70–99)
GLUCOSE UR QL: NEGATIVE — SIGNIFICANT CHANGE UP
HCT VFR BLD CALC: 29.6 % — LOW (ref 39–50)
HCT VFR BLD CALC: 35.4 % — LOW (ref 39–50)
HGB BLD-MCNC: 11.3 G/DL — LOW (ref 13–17)
HGB BLD-MCNC: 9.5 G/DL — LOW (ref 13–17)
INR BLD: 1.52 RATIO — HIGH (ref 0.88–1.16)
KETONES UR-MCNC: NEGATIVE — SIGNIFICANT CHANGE UP
LACTATE SERPL-SCNC: 1.9 MMOL/L — SIGNIFICANT CHANGE UP (ref 0.7–2)
LACTATE SERPL-SCNC: 3.4 MMOL/L — HIGH (ref 0.7–2)
LEUKOCYTE ESTERASE UR-ACNC: ABNORMAL
LYMPHOCYTES # BLD AUTO: 0.8 K/UL — LOW (ref 1–3.3)
LYMPHOCYTES # BLD AUTO: 4.7 % — LOW (ref 13–44)
MCHC RBC-ENTMCNC: 31.6 PG — SIGNIFICANT CHANGE UP (ref 27–34)
MCHC RBC-ENTMCNC: 31.8 PG — SIGNIFICANT CHANGE UP (ref 27–34)
MCHC RBC-ENTMCNC: 31.9 GM/DL — LOW (ref 32–36)
MCHC RBC-ENTMCNC: 32.2 GM/DL — SIGNIFICANT CHANGE UP (ref 32–36)
MCV RBC AUTO: 98.2 FL — SIGNIFICANT CHANGE UP (ref 80–100)
MCV RBC AUTO: 99.6 FL — SIGNIFICANT CHANGE UP (ref 80–100)
MONOCYTES # BLD AUTO: 1.3 K/UL — HIGH (ref 0–0.9)
MONOCYTES NFR BLD AUTO: 7.8 % — SIGNIFICANT CHANGE UP (ref 1–9)
NEUTROPHILS # BLD AUTO: 14.4 K/UL — HIGH (ref 1.8–7.4)
NEUTROPHILS NFR BLD AUTO: 84.3 % — HIGH (ref 43–77)
NITRITE UR-MCNC: NEGATIVE — SIGNIFICANT CHANGE UP
NT-PROBNP SERPL-SCNC: 3377 PG/ML — HIGH (ref 0–450)
PH UR: 5 — SIGNIFICANT CHANGE UP (ref 5–8)
PLATELET # BLD AUTO: 178 K/UL — SIGNIFICANT CHANGE UP (ref 150–400)
PLATELET # BLD AUTO: 197 K/UL — SIGNIFICANT CHANGE UP (ref 150–400)
POTASSIUM SERPL-MCNC: 4.5 MMOL/L — SIGNIFICANT CHANGE UP (ref 3.5–5.3)
POTASSIUM SERPL-SCNC: 4.5 MMOL/L — SIGNIFICANT CHANGE UP (ref 3.5–5.3)
PROCALCITONIN SERPL-MCNC: <0.05 NG/ML — HIGH (ref 0–0.04)
PROT SERPL-MCNC: 7.5 G/DL — SIGNIFICANT CHANGE UP (ref 6–8.3)
PROT UR-MCNC: NEGATIVE — SIGNIFICANT CHANGE UP
PROTHROM AB SERPL-ACNC: 16.7 SEC — HIGH (ref 9.8–12.7)
RAPID RVP RESULT: SIGNIFICANT CHANGE UP
RBC # BLD: 3.01 M/UL — LOW (ref 4.2–5.8)
RBC # BLD: 3.56 M/UL — LOW (ref 4.2–5.8)
RBC # FLD: 15 % — HIGH (ref 10.3–14.5)
RBC # FLD: 15.6 % — HIGH (ref 10.3–14.5)
RBC CASTS # UR COMP ASSIST: SIGNIFICANT CHANGE UP /HPF (ref 0–4)
SODIUM SERPL-SCNC: 140 MMOL/L — SIGNIFICANT CHANGE UP (ref 135–145)
SP GR SPEC: 1.01 — SIGNIFICANT CHANGE UP (ref 1.01–1.02)
TROPONIN I SERPL-MCNC: 0.03 NG/ML — SIGNIFICANT CHANGE UP (ref 0.01–0.04)
TROPONIN I SERPL-MCNC: <.015 NG/ML — SIGNIFICANT CHANGE UP (ref 0.01–0.04)
UROBILINOGEN FLD QL: NEGATIVE — SIGNIFICANT CHANGE UP
WBC # BLD: 17 K/UL — HIGH (ref 3.8–10.5)
WBC # BLD: 22.7 K/UL — HIGH (ref 3.8–10.5)
WBC # FLD AUTO: 17 K/UL — HIGH (ref 3.8–10.5)
WBC # FLD AUTO: 22.7 K/UL — HIGH (ref 3.8–10.5)
WBC UR QL: ABNORMAL

## 2018-01-25 PROCEDURE — 99285 EMERGENCY DEPT VISIT HI MDM: CPT

## 2018-01-25 PROCEDURE — 12345: CPT | Mod: NC

## 2018-01-25 PROCEDURE — 93010 ELECTROCARDIOGRAM REPORT: CPT

## 2018-01-25 PROCEDURE — 71045 X-RAY EXAM CHEST 1 VIEW: CPT | Mod: 26

## 2018-01-25 PROCEDURE — 99223 1ST HOSP IP/OBS HIGH 75: CPT | Mod: AI,GC

## 2018-01-25 RX ORDER — IPRATROPIUM/ALBUTEROL SULFATE 18-103MCG
3 AEROSOL WITH ADAPTER (GRAM) INHALATION EVERY 6 HOURS
Qty: 0 | Refills: 0 | Status: DISCONTINUED | OUTPATIENT
Start: 2018-01-25 | End: 2018-01-26

## 2018-01-25 RX ORDER — IPRATROPIUM/ALBUTEROL SULFATE 18-103MCG
3 AEROSOL WITH ADAPTER (GRAM) INHALATION ONCE
Qty: 0 | Refills: 0 | Status: COMPLETED | OUTPATIENT
Start: 2018-01-25 | End: 2018-01-25

## 2018-01-25 RX ORDER — CEFTRIAXONE 500 MG/1
1 INJECTION, POWDER, FOR SOLUTION INTRAMUSCULAR; INTRAVENOUS EVERY 24 HOURS
Qty: 0 | Refills: 0 | Status: DISCONTINUED | OUTPATIENT
Start: 2018-01-26 | End: 2018-01-25

## 2018-01-25 RX ORDER — ACETAMINOPHEN 500 MG
650 TABLET ORAL ONCE
Qty: 0 | Refills: 0 | Status: COMPLETED | OUTPATIENT
Start: 2018-01-25 | End: 2018-01-25

## 2018-01-25 RX ORDER — CAPSAICIN 0.025 %
1 CREAM (GRAM) TOPICAL
Qty: 0 | Refills: 0 | COMMUNITY

## 2018-01-25 RX ORDER — ONDANSETRON 8 MG/1
4 TABLET, FILM COATED ORAL EVERY 6 HOURS
Qty: 0 | Refills: 0 | Status: DISCONTINUED | OUTPATIENT
Start: 2018-01-25 | End: 2018-01-26

## 2018-01-25 RX ORDER — SODIUM CHLORIDE 9 MG/ML
3 INJECTION INTRAMUSCULAR; INTRAVENOUS; SUBCUTANEOUS ONCE
Qty: 0 | Refills: 0 | Status: COMPLETED | OUTPATIENT
Start: 2018-01-25 | End: 2018-01-25

## 2018-01-25 RX ORDER — CARVEDILOL PHOSPHATE 80 MG/1
12.5 CAPSULE, EXTENDED RELEASE ORAL EVERY 12 HOURS
Qty: 0 | Refills: 0 | Status: DISCONTINUED | OUTPATIENT
Start: 2018-01-25 | End: 2018-01-26

## 2018-01-25 RX ORDER — SODIUM CHLORIDE 9 MG/ML
1000 INJECTION INTRAMUSCULAR; INTRAVENOUS; SUBCUTANEOUS ONCE
Qty: 0 | Refills: 0 | Status: COMPLETED | OUTPATIENT
Start: 2018-01-25 | End: 2018-01-25

## 2018-01-25 RX ORDER — TRAMADOL HYDROCHLORIDE 50 MG/1
50 TABLET ORAL DAILY
Qty: 0 | Refills: 0 | Status: DISCONTINUED | OUTPATIENT
Start: 2018-01-25 | End: 2018-01-26

## 2018-01-25 RX ORDER — ISOSORBIDE MONONITRATE 60 MG/1
60 TABLET, EXTENDED RELEASE ORAL DAILY
Qty: 0 | Refills: 0 | Status: DISCONTINUED | OUTPATIENT
Start: 2018-01-25 | End: 2018-01-26

## 2018-01-25 RX ORDER — MOMETASONE FUROATE 220 UG/1
1 INHALANT RESPIRATORY (INHALATION) AT BEDTIME
Qty: 0 | Refills: 0 | Status: DISCONTINUED | OUTPATIENT
Start: 2018-01-25 | End: 2018-01-26

## 2018-01-25 RX ORDER — TAMSULOSIN HYDROCHLORIDE 0.4 MG/1
0.4 CAPSULE ORAL AT BEDTIME
Qty: 0 | Refills: 0 | Status: DISCONTINUED | OUTPATIENT
Start: 2018-01-25 | End: 2018-01-26

## 2018-01-25 RX ORDER — GABAPENTIN 400 MG/1
100 CAPSULE ORAL
Qty: 0 | Refills: 0 | Status: DISCONTINUED | OUTPATIENT
Start: 2018-01-25 | End: 2018-01-26

## 2018-01-25 RX ORDER — AZITHROMYCIN 500 MG/1
500 TABLET, FILM COATED ORAL EVERY 24 HOURS
Qty: 0 | Refills: 0 | Status: DISCONTINUED | OUTPATIENT
Start: 2018-01-26 | End: 2018-01-25

## 2018-01-25 RX ORDER — RIVAROXABAN 15 MG-20MG
15 KIT ORAL EVERY 24 HOURS
Qty: 0 | Refills: 0 | Status: DISCONTINUED | OUTPATIENT
Start: 2018-01-25 | End: 2018-01-26

## 2018-01-25 RX ORDER — ATORVASTATIN CALCIUM 80 MG/1
80 TABLET, FILM COATED ORAL AT BEDTIME
Qty: 0 | Refills: 0 | Status: DISCONTINUED | OUTPATIENT
Start: 2018-01-25 | End: 2018-01-26

## 2018-01-25 RX ORDER — AZITHROMYCIN 500 MG/1
500 TABLET, FILM COATED ORAL ONCE
Qty: 0 | Refills: 0 | Status: COMPLETED | OUTPATIENT
Start: 2018-01-25 | End: 2018-01-25

## 2018-01-25 RX ORDER — FENOFIBRATE,MICRONIZED 130 MG
145 CAPSULE ORAL DAILY
Qty: 0 | Refills: 0 | Status: DISCONTINUED | OUTPATIENT
Start: 2018-01-25 | End: 2018-01-26

## 2018-01-25 RX ORDER — CEFTRIAXONE 500 MG/1
1 INJECTION, POWDER, FOR SOLUTION INTRAMUSCULAR; INTRAVENOUS ONCE
Qty: 0 | Refills: 0 | Status: COMPLETED | OUTPATIENT
Start: 2018-01-25 | End: 2018-01-25

## 2018-01-25 RX ORDER — ZOLPIDEM TARTRATE 10 MG/1
5 TABLET ORAL AT BEDTIME
Qty: 0 | Refills: 0 | Status: DISCONTINUED | OUTPATIENT
Start: 2018-01-25 | End: 2018-01-26

## 2018-01-25 RX ORDER — RIVAROXABAN 15 MG-20MG
1 KIT ORAL
Qty: 0 | Refills: 0 | COMMUNITY

## 2018-01-25 RX ORDER — TEMAZEPAM 15 MG/1
15 CAPSULE ORAL AT BEDTIME
Qty: 0 | Refills: 0 | Status: DISCONTINUED | OUTPATIENT
Start: 2018-01-25 | End: 2018-01-25

## 2018-01-25 RX ORDER — ASPIRIN/CALCIUM CARB/MAGNESIUM 324 MG
81 TABLET ORAL DAILY
Qty: 0 | Refills: 0 | Status: DISCONTINUED | OUTPATIENT
Start: 2018-01-25 | End: 2018-01-26

## 2018-01-25 RX ADMIN — RIVAROXABAN 15 MILLIGRAM(S): KIT at 17:20

## 2018-01-25 RX ADMIN — ZOLPIDEM TARTRATE 5 MILLIGRAM(S): 10 TABLET ORAL at 23:31

## 2018-01-25 RX ADMIN — AZITHROMYCIN 255 MILLIGRAM(S): 500 TABLET, FILM COATED ORAL at 01:06

## 2018-01-25 RX ADMIN — CARVEDILOL PHOSPHATE 12.5 MILLIGRAM(S): 80 CAPSULE, EXTENDED RELEASE ORAL at 06:12

## 2018-01-25 RX ADMIN — GABAPENTIN 100 MILLIGRAM(S): 400 CAPSULE ORAL at 17:20

## 2018-01-25 RX ADMIN — CEFTRIAXONE 100 GRAM(S): 500 INJECTION, POWDER, FOR SOLUTION INTRAMUSCULAR; INTRAVENOUS at 01:06

## 2018-01-25 RX ADMIN — Medication 145 MILLIGRAM(S): at 12:20

## 2018-01-25 RX ADMIN — CARVEDILOL PHOSPHATE 12.5 MILLIGRAM(S): 80 CAPSULE, EXTENDED RELEASE ORAL at 17:20

## 2018-01-25 RX ADMIN — GABAPENTIN 100 MILLIGRAM(S): 400 CAPSULE ORAL at 20:09

## 2018-01-25 RX ADMIN — GABAPENTIN 100 MILLIGRAM(S): 400 CAPSULE ORAL at 09:03

## 2018-01-25 RX ADMIN — GABAPENTIN 100 MILLIGRAM(S): 400 CAPSULE ORAL at 23:30

## 2018-01-25 RX ADMIN — Medication 81 MILLIGRAM(S): at 12:20

## 2018-01-25 RX ADMIN — TRAMADOL HYDROCHLORIDE 50 MILLIGRAM(S): 50 TABLET ORAL at 23:31

## 2018-01-25 RX ADMIN — TAMSULOSIN HYDROCHLORIDE 0.4 MILLIGRAM(S): 0.4 CAPSULE ORAL at 21:43

## 2018-01-25 RX ADMIN — TRAMADOL HYDROCHLORIDE 50 MILLIGRAM(S): 50 TABLET ORAL at 22:30

## 2018-01-25 RX ADMIN — Medication 20 MILLIGRAM(S): at 06:12

## 2018-01-25 RX ADMIN — Medication 3 MILLILITER(S): at 20:07

## 2018-01-25 RX ADMIN — SODIUM CHLORIDE 1000 MILLILITER(S): 9 INJECTION INTRAMUSCULAR; INTRAVENOUS; SUBCUTANEOUS at 02:36

## 2018-01-25 RX ADMIN — Medication 650 MILLIGRAM(S): at 00:58

## 2018-01-25 RX ADMIN — ATORVASTATIN CALCIUM 80 MILLIGRAM(S): 80 TABLET, FILM COATED ORAL at 21:43

## 2018-01-25 RX ADMIN — Medication 3 MILLILITER(S): at 00:58

## 2018-01-25 RX ADMIN — SODIUM CHLORIDE 3 MILLILITER(S): 9 INJECTION INTRAMUSCULAR; INTRAVENOUS; SUBCUTANEOUS at 00:58

## 2018-01-25 NOTE — PROGRESS NOTE ADULT - ASSESSMENT
83M with PMH with CHF (LVEF 30% in Aug 2016) with AICD, MI s/p CABG, A-fib on Xarelto, prostate CA s/p radiation (2004), colon CA s/p chemo (2004) and resection with colostomy, HTN and COPD admitted for PNA.

## 2018-01-25 NOTE — PROGRESS NOTE ADULT - SUBJECTIVE AND OBJECTIVE BOX
Patient is a 83y old  Male who presents with a chief complaint of pt came to ER because he thought he was having a heart problem. (2018 10:11)      INTERVAL HPI: Pt seen and examined bedside, lying comfortably, with daughter bed side. c/w cough, no CP, urinary symptoms, N/V  OVERNIGHT EVENTS:  T(F): 97.1 (18 @ 15:51), Max: 103.1 (18 @ 00:32)  HR: 60 (18 @ 15:51) (59 - 84)  BP: 118/65 (18 @ 15:51) (88/44 - 147/83)  RR: 18 (18 @ 15:51) (16 - 20)  SpO2: 94% (18 @ 15:51) (94% - 96%)  Wt(kg): --  I&O's Summary      REVIEW OF SYSTEM:    Constitutional: No fever, chills, fatigue  Neuro: No headache, numbness, weakness  Resp: +cough, wheezing, shortness of breath  CVS: No chest pain, palpitations, leg swelling  GI: No abdominal pain, nausea, vomiting, diarrhea   : No dysuria, frequency, incontinence  Skin: No itching, burning, rashes, or lesions   Msk: No joint pain or swelling  Psych: No depression, anxiety, mood swings          PHYSICAL EXAM:  GENERAL: NAD, well-developed  HEAD:  Atraumatic, Normocephalic  EYES: EOMI, PERRLA, conjunctiva and sclera clear  NECK: Supple, No JVD, Normal thyroid  HEART: IRegular rate and rhythm; No murmurs, rubs, or gallops  RESPIRATORY: CTA B/L, +wheezing B/l   ABDOMEN: Soft, Nontender, Nondistended; Bowel sounds present, Lt side colostomy present.  NEUROLOGY: A&Ox3, nonfocal, moving all extremities.  EXTREMITIES:  2+ Peripheral Pulses, No clubbing, cyanosis, or edema  SKIN: warm, dry, normal color, no rash or abnormal lesions        LABS:                        9.5    22.7  )-----------( 178      ( 2018 11:46 )             29.6         140  |  105  |  24<H>  ----------------------------<  126<H>  4.5   |  26  |  1.70<H>    Ca    9.3      2018 00:51    TPro  7.5  /  Alb  3.3  /  TBili  0.5  /  DBili  x   /  AST  20  /  ALT  15  /  AlkPhos  56  -    PT/INR - ( 2018 00:51 )   PT: 16.7 sec;   INR: 1.52 ratio         PTT - ( 2018 00:51 )  PTT:32.2 sec  Urinalysis Basic - ( 2018 04:02 )    Color: Yellow / Appearance: Slightly Turbid / S.010 / pH: x  Gluc: x / Ketone: Negative  / Bili: Negative / Urobili: Negative   Blood: x / Protein: Negative / Nitrite: Negative   Leuk Esterase: Moderate / RBC: 0-2 /HPF / WBC 26-50   Sq Epi: x / Non Sq Epi: x / Bacteria: Few      CAPILLARY BLOOD GLUCOSE                  MEDICATIONS  (STANDING):  aspirin enteric coated 81 milliGRAM(s) Oral daily  atorvastatin 80 milliGRAM(s) Oral at bedtime  carvedilol 12.5 milliGRAM(s) Oral every 12 hours  fenofibrate Tablet 145 milliGRAM(s) Oral daily  gabapentin 100 milliGRAM(s) Oral five times a day  isosorbide   mononitrate ER Tablet (IMDUR) 60 milliGRAM(s) Oral daily  mometasone 220 MICROgram(s) Inhaler 1 Puff(s) Inhalation at bedtime  rivaroxaban 15 milliGRAM(s) Oral every 24 hours  tamsulosin 0.4 milliGRAM(s) Oral at bedtime  torsemide 20 milliGRAM(s) Oral daily    MEDICATIONS  (PRN):  ondansetron Injectable 4 milliGRAM(s) IV Push every 6 hours PRN Nausea  traMADol 50 milliGRAM(s) Oral daily PRN Moderate Pain (4 - 6)  zolpidem 5 milliGRAM(s) Oral at bedtime PRN Insomnia

## 2018-01-25 NOTE — H&P ADULT - NSHPPHYSICALEXAM_GEN_ALL_CORE
T(C): 39.5 (25 Jan 2018 00:32), Max: 39.5 (25 Jan 2018 00:32)  T(F): 103.1 (25 Jan 2018 00:32), Max: 103.1 (25 Jan 2018 00:32)  HR: 84 (24 Jan 2018 23:37) (84 - 84)  BP: 140/80 (25 Jan 2018 00:32) (140/80 - 147/83)  RR: 17 (25 Jan 2018 00:32) (16 - 17)  SpO2: 96% (25 Jan 2018 00:32) (96% - 96%)  [X] room air   [ ] 02    GENERAL:  elderly male in NAD on 3L NC   HEAD:  AT/NC  ENMT: EOMI, PERRL  NERVOUS SYSTEM:  Alert & Oriented X3, no focal deficits  CHEST/LUNG: Clear to auscultation bilaterally, diffuse rhonchi   HEART:  irregular rhythm, no murmurs, rubs, or gallops  ABDOMEN:  soft, nontender, nondistended, positive bowel sounds, +colostomy bag  EXTREMITIES: No clubbing, cyanosis or edema

## 2018-01-25 NOTE — ED PROVIDER NOTE - CARE PLAN
Principal Discharge DX:	SOB (shortness of breath)  Secondary Diagnosis:	Palpitations Principal Discharge DX:	SOB (shortness of breath)  Secondary Diagnosis:	Palpitations  Secondary Diagnosis:	CAP (community acquired pneumonia)

## 2018-01-25 NOTE — PROGRESS NOTE ADULT - PROBLEM SELECTOR PLAN 1
Leucocytosis, no fever now.  CXR neg, procal normal, doubt PNA.  Will d/c antibx. and monitor.  recurrent UTI, follow with urine cx.  ID consult requested.

## 2018-01-25 NOTE — H&P ADULT - PROBLEM SELECTOR PLAN 1
-Admit to medicine  -Start Azithromycin and Rocephin  -Continuous pulse ox  -  -follow up blood and urine cultures -Admit to medicine  -Start Azithromycin and Rocephin  -Continuous pulse ox  -follow up blood and urine cultures

## 2018-01-25 NOTE — H&P ADULT - ASSESSMENT
83M with PMH with CHF (LVEF 30% in Aug 2016) with AICD, MI s/p CABG, A-fib on Xarelto, prostate CA s/p radiation (2004), colon CA s/p chemo (2004) and resection with colostomy, HTN and COPD presents with "chest flutter" sinc 83M with PMH with CHF (LVEF 30% in Aug 2016) with AICD, MI s/p CABG, A-fib on Xarelto, prostate CA s/p radiation (2004), colon CA s/p chemo (2004) and resection with colostomy, HTN and COPD on 2L NC at night presents with "heart flutter" since 9pm tonight admitted for PNA. 83M with PMH with CHF (LVEF 30% in Aug 2016) with AICD, MI s/p CABG, A-fib on Xarelto, prostate CA s/p radiation (2004), colon CA s/p chemo (2004) and resection with colostomy, HTN and COPD admitted for PNA.

## 2018-01-25 NOTE — H&P ADULT - ATTENDING COMMENTS
Pt with pneumonia, on rocephin and zithromycin.    Cardio consulted 2/2 patient having palpitations.

## 2018-01-25 NOTE — H&P ADULT - HISTORY OF PRESENT ILLNESS
83M with PMH with CHF (LVEF 30% in Aug 2016) with AICD, MI s/p CABG, A-fib on Xarelto, prostate CA s/p radiation (2004), colon CA s/p chemo (2004) and resection with colostomy, HTN and COPD presents with "chest flutter" since .      In the ED: temp 103.1 rectal, /80, RR 17, SpO2 96% on L NC. WBC 17 H/H: 11.3/35.4, INR 1.52, BUN/Cr: 24/1.7, lactate 3.4, pro-BNP 3377. Chest Xray: increased markings/ ?consolidation in R lobe (pre-hernandez read). Received Azithromycin and Rocephin, 1L NS bolus, Tylenol, and Duoneb treatment. 83M with PMH with CHF (LVEF 30% in Aug 2016) with AICD, MI s/p CABG, A-fib on Xarelto, prostate CA s/p radiation (2004), colon CA s/p chemo (2004) and resection with colostomy, HTN and COPD on 2L NC at night presents with "heart flutter" since 9pm tonight. Patient reports productive cough, consisting of green/yellow sputum for several weeks. Went to an urgent care yesterday, had an x-ray done and was discharged without antibiotics was told he had two lung nodules which patient was aware of. Patient admits to nasal congestion, chills, diaphoresis, and heart palpitations. Denies chest pain, increased leg swelling, SOB, TRUJILLO, abd pain, n/v/c/d. Reports using nebulizer treatment and Tylenol which improved symptoms. States wife at home has the cold. Denies recent travel.     In the ED: temp 103.1 rectal, /80, RR 17, SpO2 96% on L NC. WBC 17 H/H: 11.3/35.4, INR 1.52, BUN/Cr: 24/1.7, lactate 3.4, pro-BNP 3377. Chest Xray: increased markings/ ?consolidation in R lobe (pre-hernandez read). EKG: a fib at 71. Received Azithromycin and Rocephin, 1L NS bolus, Tylenol, and Duoneb treatment.

## 2018-01-25 NOTE — H&P ADULT - PROBLEM SELECTOR PLAN 3
chronic, stable  -on 2L NC at home at bedtime  -Duonebs treatment PRN  -Asmanex inhalation at bedtime chronic, stable  -on 2L NC at home at bedtime  -Asmanex inhalation at bedtime

## 2018-01-25 NOTE — H&P ADULT - PROBLEM SELECTOR PLAN 8
IMPROVE VTE Individual Risk Assessment          RISK                                                          Points  [  ] Previous VTE                                                3  [  ] Thrombophilia                                             2  [  ] Lower limb paralysis                                   2        (unable to hold up >15 seconds)    [  ] Current Cancer                                             2         (within 6 months)  [  ] Immobilization > 24 hrs                              1  [  ] ICU/CCU stay > 24 hours                             1  [  X] Age > 60                                                         1    IMPROVE VTE Score: 1  DVT prophylaxis: On Xarelto  fall precautions

## 2018-01-25 NOTE — ED PROVIDER NOTE - OBJECTIVE STATEMENT
pt c/o sob, fluttering in chest since yest. no fever, ha, cp, abd pain, d/n/v. + occasional cough.  pmd - [facility]  juan carlos  erasmo

## 2018-01-25 NOTE — H&P ADULT - NSHPREVIEWOFSYSTEMS_GEN_ALL_CORE
CONSTITUTIONAL: positive fever, positive chills  ENMT:  No ear pain; No sinus or throat pain  RESPIRATORY: positive cough, no wheezing; No shortness of breath  CARDIOVASCULAR: No chest pain, positive palpitations, no leg swelling  GASTROINTESTINAL: No abdominal or epigastric pain. No nausea, No vomiting; No diarrhea or constipation.  GENITOURINARY: No dysuria, No frequency, No urgency, No hematuria, or incontinence  NEUROLOGICAL: alert and oriented x 3,  No headaches  SKIN:   No itching, burning, rashes, or lesions   MUSCULOSKELETAL: No joint pain or swelling; No muscle pain, No back pain, No extremity pain  PSYCHIATRIC: No depression, anxiety, mood swings, or difficulty sleeping CONSTITUTIONAL: positive fever, positive chills  ENMT:  No ear pain; No sinus or throat pain  RESPIRATORY: positive cough, no wheezing; No shortness of breath  CARDIOVASCULAR: No chest pain, positive palpitations, no leg swelling  GASTROINTESTINAL: No abdominal or epigastric pain. No nausea, No vomiting; No diarrhea or constipation.  GENITOURINARY: No dysuria, No frequency, No urgency, No hematuria, or incontinence  NEUROLOGICAL: alert and oriented x 3,  No headaches  SKIN:   No itching, burning, rashes, or lesions   MUSCULOSKELETAL: No joint pain or swelling; No muscle pain, No back pain, No extremity pain  PSYCHIATRIC: No depression, anxiety, mood swings, or difficulty sleeping*

## 2018-01-25 NOTE — H&P ADULT - PROBLEM SELECTOR PLAN 2
currently resolved  -monitor on telemetry  -continue Imdur  -Cardiology consulted,  currently resolved  -monitor on telemetry  -continue Imdur  -Cardiology consult, [facility]

## 2018-01-26 ENCOUNTER — TRANSCRIPTION ENCOUNTER (OUTPATIENT)
Age: 83
End: 2018-01-26

## 2018-01-26 VITALS
TEMPERATURE: 98 F | OXYGEN SATURATION: 99 % | RESPIRATION RATE: 17 BRPM | SYSTOLIC BLOOD PRESSURE: 126 MMHG | HEART RATE: 58 BPM | DIASTOLIC BLOOD PRESSURE: 69 MMHG

## 2018-01-26 DIAGNOSIS — R50.9 FEVER, UNSPECIFIED: ICD-10-CM

## 2018-01-26 LAB
ALBUMIN SERPL ELPH-MCNC: 2.7 G/DL — LOW (ref 3.3–5)
ALP SERPL-CCNC: 47 U/L — SIGNIFICANT CHANGE UP (ref 40–120)
ALT FLD-CCNC: 15 U/L — SIGNIFICANT CHANGE UP (ref 12–78)
ANION GAP SERPL CALC-SCNC: 9 MMOL/L — SIGNIFICANT CHANGE UP (ref 5–17)
AST SERPL-CCNC: 35 U/L — SIGNIFICANT CHANGE UP (ref 15–37)
BASOPHILS NFR BLD AUTO: 1 % — SIGNIFICANT CHANGE UP (ref 0–2)
BILIRUB SERPL-MCNC: 0.5 MG/DL — SIGNIFICANT CHANGE UP (ref 0.2–1.2)
BUN SERPL-MCNC: 32 MG/DL — HIGH (ref 7–23)
CALCIUM SERPL-MCNC: 9 MG/DL — SIGNIFICANT CHANGE UP (ref 8.5–10.1)
CHLORIDE SERPL-SCNC: 104 MMOL/L — SIGNIFICANT CHANGE UP (ref 96–108)
CO2 SERPL-SCNC: 27 MMOL/L — SIGNIFICANT CHANGE UP (ref 22–31)
CREAT SERPL-MCNC: 1.9 MG/DL — HIGH (ref 0.5–1.3)
CULTURE RESULTS: SIGNIFICANT CHANGE UP
GLUCOSE SERPL-MCNC: 96 MG/DL — SIGNIFICANT CHANGE UP (ref 70–99)
HCT VFR BLD CALC: 32.4 % — LOW (ref 39–50)
HGB BLD-MCNC: 10.3 G/DL — LOW (ref 13–17)
LYMPHOCYTES # BLD AUTO: 6 % — LOW (ref 13–44)
MCHC RBC-ENTMCNC: 31.1 PG — SIGNIFICANT CHANGE UP (ref 27–34)
MCHC RBC-ENTMCNC: 31.8 GM/DL — LOW (ref 32–36)
MCV RBC AUTO: 97.8 FL — SIGNIFICANT CHANGE UP (ref 80–100)
MONOCYTES NFR BLD AUTO: 10 % — HIGH (ref 1–9)
NEUTROPHILS NFR BLD AUTO: 81 % — HIGH (ref 43–77)
PLATELET # BLD AUTO: 182 K/UL — SIGNIFICANT CHANGE UP (ref 150–400)
POTASSIUM SERPL-MCNC: 4 MMOL/L — SIGNIFICANT CHANGE UP (ref 3.5–5.3)
POTASSIUM SERPL-SCNC: 4 MMOL/L — SIGNIFICANT CHANGE UP (ref 3.5–5.3)
PROT SERPL-MCNC: 6.8 G/DL — SIGNIFICANT CHANGE UP (ref 6–8.3)
RBC # BLD: 3.32 M/UL — LOW (ref 4.2–5.8)
RBC # FLD: 15.1 % — HIGH (ref 10.3–14.5)
SODIUM SERPL-SCNC: 140 MMOL/L — SIGNIFICANT CHANGE UP (ref 135–145)
SPECIMEN SOURCE: SIGNIFICANT CHANGE UP
WBC # BLD: 12.8 K/UL — HIGH (ref 3.8–10.5)
WBC # FLD AUTO: 12.8 K/UL — HIGH (ref 3.8–10.5)

## 2018-01-26 PROCEDURE — 87633 RESP VIRUS 12-25 TARGETS: CPT

## 2018-01-26 PROCEDURE — 85610 PROTHROMBIN TIME: CPT

## 2018-01-26 PROCEDURE — 96365 THER/PROPH/DIAG IV INF INIT: CPT

## 2018-01-26 PROCEDURE — 80053 COMPREHEN METABOLIC PANEL: CPT

## 2018-01-26 PROCEDURE — 94760 N-INVAS EAR/PLS OXIMETRY 1: CPT

## 2018-01-26 PROCEDURE — 96367 TX/PROPH/DG ADDL SEQ IV INF: CPT

## 2018-01-26 PROCEDURE — 85730 THROMBOPLASTIN TIME PARTIAL: CPT

## 2018-01-26 PROCEDURE — 97161 PT EVAL LOW COMPLEX 20 MIN: CPT

## 2018-01-26 PROCEDURE — 99285 EMERGENCY DEPT VISIT HI MDM: CPT | Mod: 25

## 2018-01-26 PROCEDURE — 87581 M.PNEUMON DNA AMP PROBE: CPT

## 2018-01-26 PROCEDURE — 84484 ASSAY OF TROPONIN QUANT: CPT

## 2018-01-26 PROCEDURE — 99239 HOSP IP/OBS DSCHRG MGMT >30: CPT

## 2018-01-26 PROCEDURE — 81001 URINALYSIS AUTO W/SCOPE: CPT

## 2018-01-26 PROCEDURE — 83880 ASSAY OF NATRIURETIC PEPTIDE: CPT

## 2018-01-26 PROCEDURE — 83605 ASSAY OF LACTIC ACID: CPT

## 2018-01-26 PROCEDURE — 82550 ASSAY OF CK (CPK): CPT

## 2018-01-26 PROCEDURE — 87040 BLOOD CULTURE FOR BACTERIA: CPT

## 2018-01-26 PROCEDURE — 82553 CREATINE MB FRACTION: CPT

## 2018-01-26 PROCEDURE — 87086 URINE CULTURE/COLONY COUNT: CPT

## 2018-01-26 PROCEDURE — 93005 ELECTROCARDIOGRAM TRACING: CPT

## 2018-01-26 PROCEDURE — 87798 DETECT AGENT NOS DNA AMP: CPT

## 2018-01-26 PROCEDURE — 84145 PROCALCITONIN (PCT): CPT

## 2018-01-26 PROCEDURE — 71045 X-RAY EXAM CHEST 1 VIEW: CPT

## 2018-01-26 PROCEDURE — 87486 CHLMYD PNEUM DNA AMP PROBE: CPT

## 2018-01-26 PROCEDURE — 94640 AIRWAY INHALATION TREATMENT: CPT

## 2018-01-26 PROCEDURE — 85027 COMPLETE CBC AUTOMATED: CPT

## 2018-01-26 RX ORDER — MUPIROCIN 20 MG/G
1 OINTMENT TOPICAL
Qty: 0 | Refills: 0 | COMMUNITY

## 2018-01-26 RX ADMIN — TRAMADOL HYDROCHLORIDE 50 MILLIGRAM(S): 50 TABLET ORAL at 13:30

## 2018-01-26 RX ADMIN — Medication 3 MILLILITER(S): at 13:36

## 2018-01-26 RX ADMIN — GABAPENTIN 100 MILLIGRAM(S): 400 CAPSULE ORAL at 12:52

## 2018-01-26 RX ADMIN — RIVAROXABAN 15 MILLIGRAM(S): KIT at 17:33

## 2018-01-26 RX ADMIN — Medication 3 MILLILITER(S): at 07:45

## 2018-01-26 RX ADMIN — Medication 20 MILLIGRAM(S): at 05:17

## 2018-01-26 RX ADMIN — ISOSORBIDE MONONITRATE 60 MILLIGRAM(S): 60 TABLET, EXTENDED RELEASE ORAL at 12:52

## 2018-01-26 RX ADMIN — Medication 145 MILLIGRAM(S): at 12:52

## 2018-01-26 RX ADMIN — CARVEDILOL PHOSPHATE 12.5 MILLIGRAM(S): 80 CAPSULE, EXTENDED RELEASE ORAL at 17:33

## 2018-01-26 RX ADMIN — Medication 81 MILLIGRAM(S): at 12:53

## 2018-01-26 RX ADMIN — TRAMADOL HYDROCHLORIDE 50 MILLIGRAM(S): 50 TABLET ORAL at 12:52

## 2018-01-26 RX ADMIN — GABAPENTIN 100 MILLIGRAM(S): 400 CAPSULE ORAL at 09:25

## 2018-01-26 RX ADMIN — CARVEDILOL PHOSPHATE 12.5 MILLIGRAM(S): 80 CAPSULE, EXTENDED RELEASE ORAL at 05:17

## 2018-01-26 RX ADMIN — Medication 3 MILLILITER(S): at 00:58

## 2018-01-26 NOTE — CONSULT NOTE ADULT - ASSESSMENT
Problem/Plan - 1:  ·  Problem: CAP (community acquired pneumonia) Vs bronchitis. Mild vascular congestion on CXR   Continue current meds. Recommend re-starting Entresto  ·    Problem/Plan - 2:  ·  Problem: COPD (chronic obstructive pulmonary disease).  Plan: chronic, stable  -on 2L NC at home at bedtime  -Asmanex inhalation at bedtime.     Problem/Plan - 3:  ·  Problem: HTN (hypertension).  Plan: chronic, well controlled  -continue current regimen      Problem/Plan - 4:  Problem: Afib. Plan: -rate controlled with Carvedilol, AC on Xarelto.    Problem/Plan - 5:  ·  Problem: CKD (chronic kidney disease).  Plan: BUN/Cr: 24/1.7  recommend re-starting Entresto Problem/Plan - 1:  ·  Problem: CAP (community acquired pneumonia) Vs bronchitis. Mild vascular congestion on CXR   Continue current meds. Recommend re-starting Entresto  ·    Problem/Plan - 2:  ·  Problem: COPD (chronic obstructive pulmonary disease).  Plan: chronic, stable  -on 2L NC at home at bedtime  -Asmanex inhalation at bedtime.     Problem/Plan - 3:  ·  Problem: HTN (hypertension).  Plan: chronic, well controlled  -continue current regimen      Problem/Plan - 4:  Problem: Afib. Plan: -rate controlled with Carvedilol, AC on Xarelto.    Problem/Plan - 5:  ·  Problem: CKD (chronic kidney disease).  Plan: BUN/Cr: 24/1.7  Recommend re-starting Entresto    Problem/Plan - 5  CAD. No anginal symptoms  Continue b blocker, anti-platelet agent        recommend re-starting Entresto

## 2018-01-26 NOTE — CONSULT NOTE ADULT - ASSESSMENT
82 yo male with COPD adm now with fevers, chills, cough productive of dark sputum, initial leukocytosis that quickly dropped, normal procalcitonin, no obv airspace disease on CXR

## 2018-01-26 NOTE — DISCHARGE NOTE ADULT - CARE PROVIDER_API CALL
Magdalene,   Phone: (   )    -  Fax: (   )    -    Stefan Moore (DO), Cardiovascular Disease; Internal Medicine; Interventional Cardiology  [address] [place] [address]  Phone: [phone]  Fax: [phone]

## 2018-01-26 NOTE — DISCHARGE NOTE ADULT - MEDICATION SUMMARY - MEDICATIONS TO TAKE
I will START or STAY ON the medications listed below when I get home from the hospital:    Aspirin Enteric Coated 81 mg oral delayed release tablet  -- 1 tab(s) by mouth once a day  -- Indication: For Afib    tramadol 50 mg oral tablet  -- 1 tab(s) by mouth once a day, As Needed  -- Indication: For Pain    Entresto 24 mg-26 mg oral tablet  -- 1 tab(s) by mouth 2 times a day  -- Indication: For HTN (hypertension)    tamsulosin 0.4 mg oral capsule  -- 1 cap(s) by mouth once a day  -- Indication: For Prostate cancer    isosorbide mononitrate 60 mg oral tablet, extended release  -- 1 tab(s) by mouth once a day  -- Indication: For CP    Xarelto 15 mg oral tablet  -- 1 tab(s) by mouth once a day (in the evening)  -- Indication: For Afib    gabapentin 100 mg oral capsule  -- 1 cap(s) by mouth 5 times a day  -- Indication: For neuropathy    fenofibrate 145 mg oral tablet  -- 1 tab(s) by mouth once a day  -- Indication: For HLD    atorvastatin 80 mg oral tablet  -- 1 tab(s) by mouth once a day  -- Indication: For Hld    temazepam 15 mg oral capsule  -- 1 cap(s) by mouth once a day (at bedtime), As needed, Insomnia  -- Indication: For Sleep    carvedilol 12.5 mg oral tablet  -- 1 tab(s) by mouth every 12 hours  -- Indication: For HTN (hypertension)    urea 20% topical cream  -- Apply on skin to affected area 2 times a day, As Needed  -- Indication: For rash    Aquaphor topical ointment  -- Apply on skin to affected area once a day  -- Indication: For Skin rash    torsemide 20 mg oral tablet  -- 1 tab(s) by mouth once a day  -- Indication: For Leg edema    potassium chloride 10 mEq oral tablet, extended release  -- 1 tab(s) by mouth once a day  -- Indication: For Chf    Levaquin 750 mg oral tablet  -- 1 tab(s) by mouth every 24 hours   -- Avoid prolonged or excessive exposure to direct and/or artificial sunlight while taking this medication.  Do not take dairy products, antacids, or iron preparations within one hour of this medication.  Finish all this medication unless otherwise directed by prescriber.  May cause drowsiness or dizziness.  Medication should be taken with plenty of water.    -- Indication: For COPD (chronic obstructive pulmonary disease)    mometasone 220 mcg/inh inhalation aerosol powder  -- 1 puff(s) inhaled once a day (in the evening)  -- Indication: For COPD (chronic obstructive pulmonary disease)

## 2018-01-26 NOTE — DISCHARGE NOTE ADULT - PLAN OF CARE
Resolved. Leucocytosis resolving, no fever now.  CXR neg, procal normal, doubt PNA.  Off antibx. and monitor.  recurrent UTI, follow with urine cx. self cath   As per ID consult c/w Levaquin and one more dose on 1/28/18 rate controlled with Carvedilol, AC on Xarelto. Resolving and  Entresto resumed stable on current meds  follow with cardio as per home care Stable on pamela meds.  follow with cardio chronic, stable  -on 2L NC at home at bedtime  -Asmanex inhalation at bedtime.

## 2018-01-26 NOTE — PROGRESS NOTE ADULT - PROBLEM SELECTOR PLAN 1
Leucocytosis resolving, no fever now.  CXR neg, procal normal, doubt PNA.  Off antibx. and monitor.  recurrent UTI, follow with urine cx. self cath   ID consult requested.

## 2018-01-26 NOTE — DISCHARGE NOTE ADULT - CARE PLAN
Principal Discharge DX:	Palpitations  Secondary Diagnosis:	Afib  Secondary Diagnosis:	CKD (chronic kidney disease)  Secondary Diagnosis:	CAD (coronary artery disease)  Secondary Diagnosis:	Colostomy care  Secondary Diagnosis:	Congestive heart failure  Secondary Diagnosis:	COPD (chronic obstructive pulmonary disease) Principal Discharge DX:	Leucocytosis  Goal:	Resolved.  Assessment and plan of treatment:	Leucocytosis resolving, no fever now.  CXR neg, procal normal, doubt PNA.  Off antibx. and monitor.  recurrent UTI, follow with urine cx. self cath   As per ID consult c/w Levaquin and one more dose on 1/28/18  Secondary Diagnosis:	Afib  Assessment and plan of treatment:	rate controlled with Carvedilol, AC on Xarelto.  Secondary Diagnosis:	CKD (chronic kidney disease)  Assessment and plan of treatment:	Resolving and  Entresto resumed  Secondary Diagnosis:	CAD (coronary artery disease)  Assessment and plan of treatment:	stable on current meds  follow with cardio  Secondary Diagnosis:	Colostomy care  Assessment and plan of treatment:	as per home care  Secondary Diagnosis:	Congestive heart failure  Assessment and plan of treatment:	Stable on pamela meds.  follow with cardio  Secondary Diagnosis:	COPD (chronic obstructive pulmonary disease)  Assessment and plan of treatment:	chronic, stable  -on 2L NC at home at bedtime  -Asmanex inhalation at bedtime.

## 2018-01-26 NOTE — CONSULT NOTE ADULT - SUBJECTIVE AND OBJECTIVE BOX
HPI:  83M with PMH with CHF (LVEF 30% in Aug 2016) with AICD, MI s/p CABG, A-fib on Xarelto, prostate CA s/p radiation (2004), colon CA s/p chemo (2004) and resection with colostomy, HTN and COPD on 2L NC at night presents with "heart flutter" since 9pm tonight. Patient reports productive cough, consisting of green/yellow/brown sputum for several weeks. Went to an urgent care, had an x-ray done and was discharged without antibiotics was told he had two lung nodules which patient was aware of. Patient admits to nasal congestion, chills, diaphoresis, and heart palpitations. Denies chest pain, increased leg swelling, SOB, TRUJILLO, abd pain, n/v/c/d. Reports using nebulizer treatment and Tylenol which improved symptoms. States wife at home has the cold. Denies recent travel.     In the ED: temp 103.1 rectal, /80, RR 17, SpO2 96% on L NC. WBC 17 H/H: 11.3/35.4, INR 1.52, BUN/Cr: 24/1.7, lactate 3.4, pro-BNP 3377. Chest Xray: increased markings/ ?consolidation in R lobe (pre-hernandez read). EKG: a fib at 71. Received Azithromycin and Rocephin, 1L NS bolus, Tylenol, and Duoneb treatment. (2018 02:19)      PAST MEDICAL & SURGICAL HISTORY:  Oxygen dependent: wears 2LNC at HS  COPD (chronic obstructive pulmonary disease)  Ischemic bowel disease  Colon cancer  Automatic implantable cardioverter-defibrillator in situ  Acute MI  Hypertension  Congestive heart failure  Insomnia  Anemia, vitamin B12 deficiency  HTN (hypertension)  Ischemic colitis, enteritis, or enterocolitis  Vitamin B 12 deficiency  Prostate cancer  Afib  CKD (chronic kidney disease)  MI (myocardial infarction)  PAD (peripheral artery disease)  CAD (coronary artery disease)  Peripheral Neuropathy  COPD (chronic obstructive pulmonary disease)  Colostomy care  Cataract extraction status of eye, right  S/P colostomy  S/P cardiac pacemaker procedure: AICD  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  S/P small bowel resection  S/P colon resection  S/P cholecystectomy  S/P bypass graft of extremity: Left to right femoral-femoral artery bypass graft 10 years ago  S/P CABG x 3      Antimicrobials      Immunological      Other  ALBUTerol/ipratropium for Nebulization 3 milliLiter(s) Nebulizer every 6 hours  aspirin enteric coated 81 milliGRAM(s) Oral daily  atorvastatin 80 milliGRAM(s) Oral at bedtime  carvedilol 12.5 milliGRAM(s) Oral every 12 hours  fenofibrate Tablet 145 milliGRAM(s) Oral daily  gabapentin 100 milliGRAM(s) Oral five times a day  isosorbide   mononitrate ER Tablet (IMDUR) 60 milliGRAM(s) Oral daily  mometasone 220 MICROgram(s) Inhaler 1 Puff(s) Inhalation at bedtime  ondansetron Injectable 4 milliGRAM(s) IV Push every 6 hours PRN  rivaroxaban 15 milliGRAM(s) Oral every 24 hours  tamsulosin 0.4 milliGRAM(s) Oral at bedtime  torsemide 20 milliGRAM(s) Oral daily  traMADol 50 milliGRAM(s) Oral daily PRN  zolpidem 5 milliGRAM(s) Oral at bedtime PRN      Allergies    No Known Allergies    Intolerances      SOCIAL HISTORY: no current tobacco use    FAMILY HISTORY:  Family history of colon cancer  Family history of MI (myocardial infarction)      ROS:    EYES:  Negative  blurry vision or double vision  GASTROINTESTINAL:  Negative for nausea, vomiting, diarrhea  -otherwise negative except for subjective    Vital Signs Last 24 Hrs  T(C): 37.2 (2018 08:59), Max: 37.2 (2018 08:59)  T(F): 98.9 (2018 08:59), Max: 98.9 (2018 08:59)  HR: 60 (2018 08:59) (59 - 66)  BP: 120/66 (2018 08:59) (90/47 - 121/69)  BP(mean): --  RR: 20 (2018 08:59) (17 - 20)  SpO2: 98% (2018 08:59) (94% - 98%)    PE:  WDWN in no distress  HEENT:  NC, PERRL, sclerae anicteric, conjunctivae inflamed on left, EOMI.  Sinuses nontender, no nasal exudate.  No buccal or pharyngeal lesions, erythema or exudate  Neck:  Supple, no adenopathy  Lungs:  No accessory muscle use, right lung base with crackles, decreased fremitus, no egophony, rare ronchi  Cor:  RRR, S1, S2, no murmur appreciated  Abd:  Symmetric, normoactive BS.  Soft, nontender, no masses, guarding or rebound.  Liver and spleen not enlarged  Extrem:  No cyanosis or edema  Skin:  chronic changes from sun damage and in right LE  Neuro: grossly intact  Musc: moving all limbs freely, no focal deficits    LABS:                        10.3   12.8  )-----------( 182      ( 2018 08:18 )             32.4   Procalcitonin, Serum: <0.05            140  |  104  |  32<H>  ----------------------------<  96  4.0   |  27  |  1.90<H>    Ca    9.0      2018 08:18    TPro  6.8  /  Alb  2.7<L>  /  TBili  0.5  /  DBili  x   /  AST  35  /  ALT  15  /  AlkPhos  47      Urinalysis Basic - ( 2018 04:02 )    Color: Yellow / Appearance: Slightly Turbid / S.010 / pH: x  Gluc: x / Ketone: Negative  / Bili: Negative / Urobili: Negative   Blood: x / Protein: Negative / Nitrite: Negative   Leuk Esterase: Moderate / RBC: 0-2 /HPF / WBC 26-50   Sq Epi: x / Non Sq Epi: x / Bacteria: Few        MICROBIOLOGY:    Rapid Respiratory Viral Panel (18 @ 00:51)    Rapid RVP Result: NotDetec: The [employer] RVP Rapid uses polymerase chain reaction (PCR) and melt  curve analysis to screen for adenovirus; coronavirus HKU1, NL63, 229E,  OC43; human metapneumovirus (hMPV); human enterovirus/rhinovirus  (Entero/RV); influenza A; influenza A/H1;influenza A/H3; influenza  A/H1-2009; influenza B; parainfluenza viruses 1, 2, 3, 4; respiratory  syncytial virus; Bordetella pertussis; Mycoplasma pneumoniae; and  Chlamydophila pneumoniae.          RADIOLOGY & ADDITIONAL STUDIES:    --< from: Xray Chest 1 View AP/PA (18 @ 01:12) >  EXAM:  XR CHEST AP OR PA 1V                            PROCEDURE DATE:  2018          INTERPRETATION:  Short of breath, palpitations.    AP chest. Prior 2017.    Left cardiac pacer reidentified. Status post CABG. No change heart   mediastinum. Mild interstitial prominence at the lung bases likely   congestive. No gross focal infiltrates. Possible small left pleural   effusion.
REQUESTING PHYSICIAN: Dr Pillai    REASON FOR CONSULT: Patient is a 83y old  Male who presents with a chief complaint of pt came to ER because he thought he was having a heart problem. (25 Jan 2018 10:11)      CHIEF COMPLAINT: Patient is a 83y old  Male who presents with a chief complaint of pt came to ER because he thought he was having a heart problem. (25 Jan 2018 10:11)      HPI:  83M with PMH with CHF (LVEF 30% in Aug 2016) with AICD, MI s/p CABG, A-fib on Xarelto, prostate CA s/p radiation (2004), colon CA s/p chemo (2004) and resection with colostomy, HTN and COPD on 2L NC at night presents with "heart flutter" since 9pm tonight. Patient reports productive cough, consisting of green/yellow sputum for several weeks. Went to an urgent care yesterday, had an x-ray done and was discharged without antibiotics was told he had two lung nodules which patient was aware of. Patient admits to nasal congestion, chills, diaphoresis, and heart palpitations. Denies chest pain, increased leg swelling, SOB, TRUJILLO, abd pain, n/v/c/d. Reports using nebulizer treatment and Tylenol which improved symptoms. States wife at home has the cold. Denies recent travel.         PAST MEDICAL / SURGICAL HISTORY:  PAST MEDICAL & SURGICAL HISTORY:  Oxygen dependent: wears 2LNC at HS  COPD (chronic obstructive pulmonary disease)  Ischemic bowel disease  Colon cancer  Automatic implantable cardioverter-defibrillator in situ  Acute MI  Hypertension  Congestive heart failure  Insomnia  Anemia, vitamin B12 deficiency  HTN (hypertension)  Ischemic colitis, enteritis, or enterocolitis  Vitamin B 12 deficiency  Prostate cancer  Afib  CKD (chronic kidney disease)  MI (myocardial infarction)  PAD (peripheral artery disease)  CAD (coronary artery disease)  Peripheral Neuropathy  COPD (chronic obstructive pulmonary disease)  Colostomy care  Cataract extraction status of eye, right  S/P colostomy  S/P cardiac pacemaker procedure: AICD  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  S/P small bowel resection  S/P colon resection  S/P cholecystectomy  S/P bypass graft of extremity: Left to right femoral-femoral artery bypass graft 10 years ago  S/P CABG x 3        FAMILY HISTORY: FAMILY HISTORY:  Family history of colon cancer  Family history of MI (myocardial infarction)      MEDICATIONS  (STANDING):  ALBUTerol/ipratropium for Nebulization 3 milliLiter(s) Nebulizer every 6 hours  aspirin enteric coated 81 milliGRAM(s) Oral daily  atorvastatin 80 milliGRAM(s) Oral at bedtime  carvedilol 12.5 milliGRAM(s) Oral every 12 hours  fenofibrate Tablet 145 milliGRAM(s) Oral daily  gabapentin 100 milliGRAM(s) Oral five times a day  isosorbide   mononitrate ER Tablet (IMDUR) 60 milliGRAM(s) Oral daily  mometasone 220 MICROgram(s) Inhaler 1 Puff(s) Inhalation at bedtime  rivaroxaban 15 milliGRAM(s) Oral every 24 hours  tamsulosin 0.4 milliGRAM(s) Oral at bedtime  torsemide 20 milliGRAM(s) Oral daily    MEDICATIONS  (PRN):  ondansetron Injectable 4 milliGRAM(s) IV Push every 6 hours PRN Nausea  traMADol 50 milliGRAM(s) Oral daily PRN Moderate Pain (4 - 6)  zolpidem 5 milliGRAM(s) Oral at bedtime PRN Insomnia      Allergies    No Known Allergies    Intolerances        REVIEW OF SYSTEMS:    CONSTITUTIONAL: No weakness,  +fevers/chills  EYES/ENT: No visual changes;  No vertigo or throat pain   NECK: No pain or stiffness  RESPIRATORY:  cough,   CARDIOVASCULAR: No chest pain or palpitations  GASTROINTESTINAL: No abdominal pain. No nausea, vomiting, or hematemesis; No diarrhea or constipation. No melena or hematochezia.  GENITOURINARY: No dysuria, frequency or hematuria  NEUROLOGICAL: No numbness or weakness  SKIN: No itching or rash  All other review of systems is negative unless indicated above    VITAL SIGNS:   Vital Signs Last 24 Hrs  T(C): 37.2 (26 Jan 2018 08:59), Max: 37.2 (26 Jan 2018 08:59)  T(F): 98.9 (26 Jan 2018 08:59), Max: 98.9 (26 Jan 2018 08:59)  HR: 60 (26 Jan 2018 08:59) (59 - 66)  BP: 120/66 (26 Jan 2018 08:59) (90/47 - 121/69)  BP(mean): --  RR: 20 (26 Jan 2018 08:59) (17 - 20)  SpO2: 98% (26 Jan 2018 08:59) (94% - 98%)    I&O's Summary    25 Jan 2018 07:01  -  26 Jan 2018 07:00  --------------------------------------------------------  IN: 700 mL / OUT: 950 mL / NET: -250 mL    26 Jan 2018 07:01  -  26 Jan 2018 11:19  --------------------------------------------------------  IN: 0 mL / OUT: 300 mL / NET: -300 mL        PHYSICAL EXAM:    Constitutional: NAD, awake and alert, well-developed  Eyes:  EOMI,  Pupils round, No oral cyanosis.  Pulmonary: Non-labored, breath sounds are clear bilaterally, No wheezing, rales or rhonchi  Cardiovascular: S1 and S2, irregular rate and rhythm, no Murmurs, gallops or rubs  Gastrointestinal: Bowel Sounds present, soft, nontender.   Lymph: No peripheral edema. No cervical lymphadenopathy.  Neurological: Alert, no focal deficits  Skin: No rashes.  Psych:  Mood & affect appropriate    LABS: All Labs Reviewed:                        10.3   12.8  )-----------( 182      ( 26 Jan 2018 08:18 )             32.4                         9.5    22.7  )-----------( 178      ( 25 Jan 2018 11:46 )             29.6                         11.3   17.0  )-----------( 197      ( 25 Jan 2018 00:51 )             35.4     26 Jan 2018 08:18    140    |  104    |  32     ----------------------------<  96     4.0     |  27     |  1.90   25 Jan 2018 00:51    140    |  105    |  24     ----------------------------<  126    4.5     |  26     |  1.70     Ca    9.0        26 Jan 2018 08:18  Ca    9.3        25 Jan 2018 00:51    TPro  6.8    /  Alb  2.7    /  TBili  0.5    /  DBili  x      /  AST  35     /  ALT  15     /  AlkPhos  47     26 Jan 2018 08:18  TPro  7.5    /  Alb  3.3    /  TBili  0.5    /  DBili  x      /  AST  20     /  ALT  15     /  AlkPhos  56     25 Jan 2018 00:51    PT/INR - ( 25 Jan 2018 00:51 )   PT: 16.7 sec;   INR: 1.52 ratio         PTT - ( 25 Jan 2018 00:51 )  PTT:32.2 sec  CARDIAC MARKERS ( 25 Jan 2018 05:46 )  .026 ng/mL / x     / 61 U/L / x     / 1.4 ng/mL  CARDIAC MARKERS ( 25 Jan 2018 00:51 )  <.015 ng/mL / x     / 55 U/L / x     / 1.8 ng/mL      Blood Culture: Organism --  Gram Stain Blood -- Gram Stain --  Specimen Source .Blood Blood  Culture-Blood --      01-25 @ 00:51  Pro Bnp 3377        ECG:  < from: 12 Lead ECG (01.25.18 @ 00:10) >  Ventricular Rate 71 BPM    QRS Duration 130 ms    Q-T Interval 420 ms    QTC Calculation(Bezet) 456 ms    R Axis -41 degrees    T Axis 107 degrees    Diagnosis Line Atrial fibrillation  Left axis deviation  Nonspecific intraventricular block  T wave abnormality, consider lateral ischemia or digitalis effect  Baseline artifact  Abnormal ECG    Confirmed by Moose Carlin ([id number]) on 1/25/2018 10:48:29 AM          Imaging:    EXAM:  XR CHEST AP OR PA 1V                            PROCEDURE DATE:  01/25/2018          INTERPRETATION:  Short of breath, palpitations.    AP chest. Prior 5/31/2017.    Left cardiac pacer reidentified. Status post CABG. No change heart   mediastinum. Mild interstitial prominence at the lung bases likely   congestive. No gross focal infiltrates. Possible small left pleural   effusion.    Impression: As above        IRIS MOREAU M.D., ATTENDING RADIOLOGIST  This document has been electronically signed. Jan 25 2018  8:15AM

## 2018-01-26 NOTE — PROGRESS NOTE ADULT - SUBJECTIVE AND OBJECTIVE BOX
Patient is a 83y old  Male who presents with a chief complaint of pt came to ER because he thought he was having a heart problem. (2018 10:11)      INTERVAL HPI: Pt seen and examined bedside, lying comfortably, with daughter bed side. c/o cough, no CP, urinary symptoms, N/V  OVERNIGHT EVENTS:  T(F): 98.9 (18 @ 08:59), Max: 98.9 (18 @ 08:59)  HR: 60 (18 @ 08:59) (59 - 65)  BP: 120/66 (18 @ 08:59) (90/47 - 121/69)  RR: 20 (18 @ 08:59) (17 - 20)  SpO2: 98% (18 @ 08:59) (94% - 98%)  Wt(kg): --  I&O's Summary    2018 07:  -  2018 07:00  --------------------------------------------------------  IN: 700 mL / OUT: 950 mL / NET: -250 mL    2018 07:  -  2018 09:51  --------------------------------------------------------  IN: 0 mL / OUT: 300 mL / NET: -300 mL        REVIEW OF SYSTEM:    Constitutional: No fever, chills, fatigue  Neuro: No headache, numbness, weakness  Resp: mild cough, wheezing, shortness of breath  CVS: No chest pain, palpitations, leg swelling  GI: No abdominal pain, nausea, vomiting, diarrhea   : No dysuria, frequency, incontinence  Skin: No itching, burning, rashes, or lesions   Msk: No joint pain or swelling  Psych: No depression, anxiety, mood swings          PHYSICAL EXAM:  GENERAL: NAD, well-developed  HEAD:  Atraumatic, Normocephalic  EYES: EOMI, PERRLA, conjunctiva and sclera clear  ENMT: No tonsillar erythema, exudates, or enlargement; Moist mucous membranes,   NECK: Supple, No JVD, Normal thyroid  HEART: Regular rate and rhythm; No murmurs, rubs, or gallops  RESPIRATORY: CTA B/L, No wheezing / rhonchi  ABDOMEN: Soft, Nontender, Nondistended; Bowel sounds present  NEUROLOGY: A&Ox3, nonfocal, moving all extremities.  EXTREMITIES:  2+ Peripheral Pulses, No clubbing, cyanosis, or edema  SKIN: warm, dry, normal color, no rash or abnormal lesions        LABS:                        10.3   12.8  )-----------( 182      ( 2018 08:18 )             32.4         140  |  104  |  32<H>  ----------------------------<  96  4.0   |  27  |  1.90<H>    Ca    9.0      2018 08:18    TPro  6.8  /  Alb  2.7<L>  /  TBili  0.5  /  DBili  x   /  AST  35  /  ALT  15  /  AlkPhos  47      PT/INR - ( 2018 00:51 )   PT: 16.7 sec;   INR: 1.52 ratio         PTT - ( 2018 00:51 )  PTT:32.2 sec  Urinalysis Basic - ( 2018 04:02 )    Color: Yellow / Appearance: Slightly Turbid / S.010 / pH: x  Gluc: x / Ketone: Negative  / Bili: Negative / Urobili: Negative   Blood: x / Protein: Negative / Nitrite: Negative   Leuk Esterase: Moderate / RBC: 0-2 /HPF / WBC 26-50   Sq Epi: x / Non Sq Epi: x / Bacteria: Few      CAPILLARY BLOOD GLUCOSE           @ 08:53   No growth to date.  --  --          MEDICATIONS  (STANDING):  ALBUTerol/ipratropium for Nebulization 3 milliLiter(s) Nebulizer every 6 hours  aspirin enteric coated 81 milliGRAM(s) Oral daily  atorvastatin 80 milliGRAM(s) Oral at bedtime  carvedilol 12.5 milliGRAM(s) Oral every 12 hours  fenofibrate Tablet 145 milliGRAM(s) Oral daily  gabapentin 100 milliGRAM(s) Oral five times a day  isosorbide   mononitrate ER Tablet (IMDUR) 60 milliGRAM(s) Oral daily  mometasone 220 MICROgram(s) Inhaler 1 Puff(s) Inhalation at bedtime  rivaroxaban 15 milliGRAM(s) Oral every 24 hours  tamsulosin 0.4 milliGRAM(s) Oral at bedtime  torsemide 20 milliGRAM(s) Oral daily    MEDICATIONS  (PRN):  ondansetron Injectable 4 milliGRAM(s) IV Push every 6 hours PRN Nausea  traMADol 50 milliGRAM(s) Oral daily PRN Moderate Pain (4 - 6)  zolpidem 5 milliGRAM(s) Oral at bedtime PRN Insomnia

## 2018-01-26 NOTE — CONSULT NOTE ADULT - PROBLEM SELECTOR RECOMMENDATION 9
Presentation is consistent with COPD exacerbation and not PNA as patient has no evidence of airspace disease on imaging or exam and does have negative procalcitonin.  Consider pulm involvement/management for COPD exac with steroids, bronchodilators, oxygen as needed and short course of PO abx due to increased production of purulent sputum and increased age. Recommend:  Levaquin 750mg PO Q-day x 5 days and may transition to outpt setting when patient improved and a safe discharge plan possible Presentation is consistent with COPD exacerbation and not PNA as patient has no evidence of airspace disease on imaging or exam and does have negative procalcitonin.  Consider pulm involvement/management for COPD exac with steroids, bronchodilators, oxygen as needed and short course of PO abx due to increased production of purulent sputum and increased age. Recommend:  Levaquin 750mg PO today and a second dose on Sunday and may transition to outpt setting when patient improved and a safe discharge plan possible

## 2018-01-26 NOTE — DISCHARGE NOTE ADULT - PATIENT PORTAL LINK FT
“You can access the [facility] Patient Portal, offered by [facility], by registering with the following website: [email or web address]”

## 2018-01-26 NOTE — CONSULT NOTE ADULT - PROBLEM SELECTOR RECOMMENDATION 3
no documented fever, subjective alone, recommend following.    Thank you for consulting us and involving us in the management of this most interesting and challenging case.     We will follow along in the care of this patient.    Over the weekend Dr. Karolina Dan will be covering for our group. If you have any questions please reach out to them at [phone].

## 2018-01-26 NOTE — DISCHARGE NOTE ADULT - SECONDARY DIAGNOSIS.
Afib CKD (chronic kidney disease) CAD (coronary artery disease) Colostomy care Congestive heart failure COPD (chronic obstructive pulmonary disease)

## 2018-01-26 NOTE — PHYSICAL THERAPY INITIAL EVALUATION ADULT - ADDITIONAL COMMENTS
Pt lives with his spouse in a house, no steps. Pt ambulates independently with RW or SC and spouse able to assist with ADLs as needed. Pt uses home O2 at night

## 2018-01-26 NOTE — DISCHARGE NOTE ADULT - HOSPITAL COURSE
83M with PMH with CHF (LVEF 30% in Aug 2016) with AICD, MI s/p CABG, A-fib on Xarelto, prostate CA s/p radiation (2004), colon CA s/p chemo (2004) and resection with colostomy, HTN and COPD on 2L NC at night presents with "heart flutter" since 9pm tonight. Patient reports productive cough, consisting of green/yellow sputum for several weeks. Went to an urgent care yesterday, had an x-ray done and was discharged without antibiotics was told he had two lung nodules which patient was aware of. Patient admits to nasal congestion, chills, diaphoresis, and heart palpitations. Denies chest pain, increased leg swelling, SOB, TRUJILLO, abd pain, n/v/c/d. Reports using nebulizer treatment and Tylenol which improved symptoms. States wife at home has the cold. Denies recent travel.     In the ED: temp 103.1 rectal, /80, RR 17, SpO2 96% on L NC. WBC 17 H/H: 11.3/35.4, INR 1.52, BUN/Cr: 24/1.7, lactate 3.4, pro-BNP 3377. Chest Xray: increased markings/ ?consolidation in R lobe (pre-hernandez read). EKG: a fib at 71. Received Azithromycin and Rocephin, 1L NS bolus, Tylenol, and Duoneb treatment.   Admitted for leucocytosis, fever and palpitation. Pt had no evidence of PNA on CXR and CT chest. Pt followe by cardio, and ID.   Presentation is consistent with COPD exacerbation and not PNA as patient has no evidence of airspace disease on imaging or exam and does have negative procalcitonin.  Consider pulm involvement/management for COPD exac with , bronchodilators, oxygen as needed and short course of PO abx due to increased production of purulent sputum and increased age. Recommend:  Levaquin 750mg PO today and a second dose on Sunday and may transition to outpt setting when patient improved and a safe discharge plan possible.stable for d/c   Vital Signs Last 24 Hrs  T(C): 36.4 (26 Jan 2018 11:29), Max: 37.2 (26 Jan 2018 08:59)  T(F): 97.5 (26 Jan 2018 11:29), Max: 98.9 (26 Jan 2018 08:59)  HR: 66 (26 Jan 2018 11:29) (60 - 66)  BP: 120/62 (26 Jan 2018 11:29) (110/62 - 121/69)  BP(mean): --  RR: 18 (26 Jan 2018 11:29) (17 - 20)  SpO2: 98% (26 Jan 2018 11:29) (94% - 98%)    PHYSICAL EXAM:  GENERAL: NAD, well-developed  HEAD:  Atraumatic, Normocephalic  EYES: EOMI, PERRLA, conjunctiva and sclera clear  ENMT: No tonsillar erythema, exudates, or enlargement; Moist mucous membranes,   NECK: Supple, No JVD, Normal thyroid  HEART: Regular rate and rhythm; No murmurs, rubs, or gallops  RESPIRATORY: CTA B/L, No wheezing / rhonchi  ABDOMEN: Soft, Nontender, Nondistended; Bowel sounds present  NEUROLOGY: A&Ox3, nonfocal, moving all extremities.  EXTREMITIES:  2+ Peripheral Pulses, No clubbing, cyanosis, or edema  SKIN: warm, dry, normal color, no rash or abnormal lesions    PMD could not be contacted.  cardio aware.

## 2018-02-12 ENCOUNTER — APPOINTMENT (OUTPATIENT)
Dept: VASCULAR SURGERY | Facility: CLINIC | Age: 83
End: 2018-02-12
Payer: MEDICARE

## 2018-02-12 PROCEDURE — 93880 EXTRACRANIAL BILAT STUDY: CPT

## 2018-02-12 PROCEDURE — 99214 OFFICE O/P EST MOD 30 MIN: CPT

## 2018-02-12 PROCEDURE — 93925 LOWER EXTREMITY STUDY: CPT

## 2018-03-07 ENCOUNTER — APPOINTMENT (OUTPATIENT)
Dept: ORTHOPEDIC SURGERY | Facility: CLINIC | Age: 83
End: 2018-03-07

## 2018-03-13 ENCOUNTER — APPOINTMENT (OUTPATIENT)
Dept: ORTHOPEDIC SURGERY | Facility: CLINIC | Age: 83
End: 2018-03-13

## 2018-03-20 ENCOUNTER — APPOINTMENT (OUTPATIENT)
Dept: ORTHOPEDIC SURGERY | Facility: CLINIC | Age: 83
End: 2018-03-20
Payer: MEDICARE

## 2018-03-20 PROCEDURE — 20610 DRAIN/INJ JOINT/BURSA W/O US: CPT | Mod: 50

## 2018-05-21 ENCOUNTER — APPOINTMENT (OUTPATIENT)
Dept: VASCULAR SURGERY | Facility: CLINIC | Age: 83
End: 2018-05-21
Payer: MEDICARE

## 2018-05-21 VITALS
HEART RATE: 60 BPM | HEIGHT: 72 IN | BODY MASS INDEX: 26.68 KG/M2 | SYSTOLIC BLOOD PRESSURE: 132 MMHG | WEIGHT: 197 LBS | DIASTOLIC BLOOD PRESSURE: 66 MMHG

## 2018-05-21 DIAGNOSIS — I70.229 ATHEROSCLEROSIS OF NATIVE ARTERIES OF EXTREMITIES WITH REST PAIN, UNSPECIFIED EXTREMITY: ICD-10-CM

## 2018-05-21 PROCEDURE — 99214 OFFICE O/P EST MOD 30 MIN: CPT

## 2018-05-21 PROCEDURE — 93923 UPR/LXTR ART STDY 3+ LVLS: CPT

## 2018-05-21 RX ORDER — SACUBITRIL AND VALSARTAN 24; 26 MG/1; MG/1
24-26 TABLET, FILM COATED ORAL
Refills: 0 | Status: ACTIVE | COMMUNITY

## 2018-05-21 RX ORDER — DARBEPOETIN ALFA 100 UG/.5ML
100 INJECTION, SOLUTION INTRAVENOUS; SUBCUTANEOUS
Qty: 1 | Refills: 0 | Status: ACTIVE | COMMUNITY
Start: 2017-03-21

## 2018-05-21 RX ORDER — ERYTHROMYCIN 5 MG/G
5 OINTMENT OPHTHALMIC
Qty: 3 | Refills: 0 | Status: ACTIVE | COMMUNITY
Start: 2017-11-21

## 2018-05-21 RX ORDER — MUPIROCIN 20 MG/G
2 OINTMENT TOPICAL
Qty: 44 | Refills: 0 | Status: ACTIVE | COMMUNITY
Start: 2017-08-25

## 2018-06-04 ENCOUNTER — FORM ENCOUNTER (OUTPATIENT)
Age: 83
End: 2018-06-04

## 2018-06-05 ENCOUNTER — APPOINTMENT (OUTPATIENT)
Dept: ENDOVASCULAR SURGERY | Facility: CLINIC | Age: 83
End: 2018-06-05
Payer: MEDICARE

## 2018-06-05 DIAGNOSIS — I74.3 EMBOLISM AND THROMBOSIS OF ARTERIES OF THE LOWER EXTREMITIES: ICD-10-CM

## 2018-06-05 PROCEDURE — 37225Z: CUSTOM | Mod: 53,LT

## 2018-06-06 NOTE — H&P ADULT - NSHPSOCIALHISTORY_GEN_ALL_CORE
Per verbal order from [facility], draw up 2 syringes each with 1.5ml of 0.5% Marcaine and 1.5ml of 2% lidocaine for injection to bilateral hallux.   Stu Frederick RN Marital Status:   Lives with: wife, ambulates with cane/walker    Substance Use (street drugs): denies   Tobacco Usage: former, quit 40+ years  Alcohol Usage: denies

## 2018-06-11 ENCOUNTER — APPOINTMENT (OUTPATIENT)
Dept: VASCULAR SURGERY | Facility: CLINIC | Age: 83
End: 2018-06-11
Payer: MEDICARE

## 2018-06-11 PROCEDURE — 99214 OFFICE O/P EST MOD 30 MIN: CPT

## 2018-06-19 ENCOUNTER — APPOINTMENT (OUTPATIENT)
Dept: ORTHOPEDIC SURGERY | Facility: CLINIC | Age: 83
End: 2018-06-19
Payer: MEDICARE

## 2018-06-19 PROCEDURE — 20610 DRAIN/INJ JOINT/BURSA W/O US: CPT | Mod: 50

## 2018-07-19 ENCOUNTER — INPATIENT (INPATIENT)
Facility: HOSPITAL | Age: 83
LOS: 3 days | Discharge: HOME | DRG: 602 | End: 2018-07-23
Attending: INTERNAL MEDICINE | Admitting: INTERNAL MEDICINE
Payer: COMMERCIAL

## 2018-07-19 VITALS
HEART RATE: 81 BPM | HEIGHT: 73 IN | WEIGHT: 199.96 LBS | TEMPERATURE: 99 F | DIASTOLIC BLOOD PRESSURE: 78 MMHG | RESPIRATION RATE: 16 BRPM | OXYGEN SATURATION: 94 % | SYSTOLIC BLOOD PRESSURE: 125 MMHG

## 2018-07-19 DIAGNOSIS — J44.9 CHRONIC OBSTRUCTIVE PULMONARY DISEASE, UNSPECIFIED: ICD-10-CM

## 2018-07-19 DIAGNOSIS — I48.91 UNSPECIFIED ATRIAL FIBRILLATION: ICD-10-CM

## 2018-07-19 DIAGNOSIS — L03.90 CELLULITIS, UNSPECIFIED: ICD-10-CM

## 2018-07-19 DIAGNOSIS — I25.10 ATHEROSCLEROTIC HEART DISEASE OF NATIVE CORONARY ARTERY WITHOUT ANGINA PECTORIS: ICD-10-CM

## 2018-07-19 DIAGNOSIS — I73.9 PERIPHERAL VASCULAR DISEASE, UNSPECIFIED: ICD-10-CM

## 2018-07-19 DIAGNOSIS — I10 ESSENTIAL (PRIMARY) HYPERTENSION: ICD-10-CM

## 2018-07-19 DIAGNOSIS — I50.9 HEART FAILURE, UNSPECIFIED: ICD-10-CM

## 2018-07-19 DIAGNOSIS — Z43.3 ENCOUNTER FOR ATTENTION TO COLOSTOMY: Chronic | ICD-10-CM

## 2018-07-19 DIAGNOSIS — Z29.9 ENCOUNTER FOR PROPHYLACTIC MEASURES, UNSPECIFIED: ICD-10-CM

## 2018-07-19 DIAGNOSIS — N28.9 DISORDER OF KIDNEY AND URETER, UNSPECIFIED: ICD-10-CM

## 2018-07-19 DIAGNOSIS — L03.116 CELLULITIS OF LEFT LOWER LIMB: ICD-10-CM

## 2018-07-19 PROBLEM — Z99.81 DEPENDENCE ON SUPPLEMENTAL OXYGEN: Chronic | Status: ACTIVE | Noted: 2017-05-31

## 2018-07-19 PROBLEM — K55.9 VASCULAR DISORDER OF INTESTINE, UNSPECIFIED: Chronic | Status: ACTIVE | Noted: 2017-05-31

## 2018-07-19 LAB
ALBUMIN SERPL ELPH-MCNC: 3.2 G/DL — LOW (ref 3.3–5)
ALP SERPL-CCNC: 36 U/L — LOW (ref 40–120)
ALT FLD-CCNC: 14 U/L — SIGNIFICANT CHANGE UP (ref 12–78)
ANION GAP SERPL CALC-SCNC: 8 MMOL/L — SIGNIFICANT CHANGE UP (ref 5–17)
APPEARANCE UR: CLEAR — SIGNIFICANT CHANGE UP
APTT BLD: 33.4 SEC — SIGNIFICANT CHANGE UP (ref 27.5–37.4)
AST SERPL-CCNC: 22 U/L — SIGNIFICANT CHANGE UP (ref 15–37)
BASOPHILS # BLD AUTO: 0 K/UL — SIGNIFICANT CHANGE UP (ref 0–0.2)
BASOPHILS NFR BLD AUTO: 0 % — SIGNIFICANT CHANGE UP (ref 0–2)
BILIRUB SERPL-MCNC: 0.6 MG/DL — SIGNIFICANT CHANGE UP (ref 0.2–1.2)
BILIRUB UR-MCNC: NEGATIVE — SIGNIFICANT CHANGE UP
BUN SERPL-MCNC: 33 MG/DL — HIGH (ref 7–23)
CALCIUM SERPL-MCNC: 9.1 MG/DL — SIGNIFICANT CHANGE UP (ref 8.5–10.1)
CHLORIDE SERPL-SCNC: 100 MMOL/L — SIGNIFICANT CHANGE UP (ref 96–108)
CO2 SERPL-SCNC: 28 MMOL/L — SIGNIFICANT CHANGE UP (ref 22–31)
COLOR SPEC: YELLOW — SIGNIFICANT CHANGE UP
CREAT SERPL-MCNC: 2.3 MG/DL — HIGH (ref 0.5–1.3)
DIFF PNL FLD: ABNORMAL
EOSINOPHIL # BLD AUTO: 0 K/UL — SIGNIFICANT CHANGE UP (ref 0–0.5)
EOSINOPHIL NFR BLD AUTO: 0 % — SIGNIFICANT CHANGE UP (ref 0–6)
ERYTHROCYTE [SEDIMENTATION RATE] IN BLOOD: 38 MM/HR — HIGH (ref 0–20)
GLUCOSE SERPL-MCNC: 111 MG/DL — HIGH (ref 70–99)
GLUCOSE UR QL: NEGATIVE — SIGNIFICANT CHANGE UP
HCT VFR BLD CALC: 33.4 % — LOW (ref 39–50)
HGB BLD-MCNC: 10.8 G/DL — LOW (ref 13–17)
INR BLD: 1.68 RATIO — HIGH (ref 0.88–1.16)
KETONES UR-MCNC: NEGATIVE — SIGNIFICANT CHANGE UP
LACTATE SERPL-SCNC: 1.3 MMOL/L — SIGNIFICANT CHANGE UP (ref 0.7–2)
LEUKOCYTE ESTERASE UR-ACNC: ABNORMAL
LIDOCAIN IGE QN: 92 U/L — SIGNIFICANT CHANGE UP (ref 73–393)
LYMPHOCYTES # BLD AUTO: 0.14 K/UL — LOW (ref 1–3.3)
LYMPHOCYTES # BLD AUTO: 1 % — LOW (ref 13–44)
MCHC RBC-ENTMCNC: 31.8 PG — SIGNIFICANT CHANGE UP (ref 27–34)
MCHC RBC-ENTMCNC: 32.3 GM/DL — SIGNIFICANT CHANGE UP (ref 32–36)
MCV RBC AUTO: 98.2 FL — SIGNIFICANT CHANGE UP (ref 80–100)
MONOCYTES # BLD AUTO: 0.56 K/UL — SIGNIFICANT CHANGE UP (ref 0–0.9)
MONOCYTES NFR BLD AUTO: 4 % — SIGNIFICANT CHANGE UP (ref 2–14)
NEUTROPHILS # BLD AUTO: 13.26 K/UL — HIGH (ref 1.8–7.4)
NEUTROPHILS NFR BLD AUTO: 88 % — HIGH (ref 43–77)
NITRITE UR-MCNC: NEGATIVE — SIGNIFICANT CHANGE UP
PH UR: 5 — SIGNIFICANT CHANGE UP (ref 5–8)
PLATELET # BLD AUTO: 233 K/UL — SIGNIFICANT CHANGE UP (ref 150–400)
POTASSIUM SERPL-MCNC: 4 MMOL/L — SIGNIFICANT CHANGE UP (ref 3.5–5.3)
POTASSIUM SERPL-SCNC: 4 MMOL/L — SIGNIFICANT CHANGE UP (ref 3.5–5.3)
PROCALCITONIN SERPL-MCNC: 4.4 NG/ML — HIGH (ref 0–0.04)
PROT SERPL-MCNC: 6.8 G/DL — SIGNIFICANT CHANGE UP (ref 6–8.3)
PROT UR-MCNC: 25 MG/DL
PROTHROM AB SERPL-ACNC: 18.5 SEC — HIGH (ref 9.8–12.7)
RBC # BLD: 3.4 M/UL — LOW (ref 4.2–5.8)
RBC # FLD: 15.4 % — HIGH (ref 10.3–14.5)
SODIUM SERPL-SCNC: 136 MMOL/L — SIGNIFICANT CHANGE UP (ref 135–145)
SP GR SPEC: 1.01 — SIGNIFICANT CHANGE UP (ref 1.01–1.02)
UROBILINOGEN FLD QL: NEGATIVE — SIGNIFICANT CHANGE UP
WBC # BLD: 13.96 K/UL — HIGH (ref 3.8–10.5)
WBC # FLD AUTO: 13.96 K/UL — HIGH (ref 3.8–10.5)

## 2018-07-19 PROCEDURE — 99223 1ST HOSP IP/OBS HIGH 75: CPT | Mod: GC

## 2018-07-19 PROCEDURE — 99285 EMERGENCY DEPT VISIT HI MDM: CPT

## 2018-07-19 PROCEDURE — 71045 X-RAY EXAM CHEST 1 VIEW: CPT | Mod: 26

## 2018-07-19 PROCEDURE — 73590 X-RAY EXAM OF LOWER LEG: CPT | Mod: 26,LT

## 2018-07-19 PROCEDURE — 70450 CT HEAD/BRAIN W/O DYE: CPT | Mod: 26

## 2018-07-19 PROCEDURE — 74176 CT ABD & PELVIS W/O CONTRAST: CPT | Mod: 26

## 2018-07-19 PROCEDURE — 93970 EXTREMITY STUDY: CPT | Mod: 26

## 2018-07-19 RX ORDER — ATORVASTATIN CALCIUM 80 MG/1
80 TABLET, FILM COATED ORAL AT BEDTIME
Qty: 0 | Refills: 0 | Status: DISCONTINUED | OUTPATIENT
Start: 2018-07-19 | End: 2018-07-23

## 2018-07-19 RX ORDER — TRAMADOL HYDROCHLORIDE 50 MG/1
25 TABLET ORAL DAILY
Qty: 0 | Refills: 0 | Status: DISCONTINUED | OUTPATIENT
Start: 2018-07-19 | End: 2018-07-23

## 2018-07-19 RX ORDER — ASPIRIN/CALCIUM CARB/MAGNESIUM 324 MG
81 TABLET ORAL DAILY
Qty: 0 | Refills: 0 | Status: DISCONTINUED | OUTPATIENT
Start: 2018-07-19 | End: 2018-07-23

## 2018-07-19 RX ORDER — ACETAMINOPHEN 500 MG
650 TABLET ORAL EVERY 6 HOURS
Qty: 0 | Refills: 0 | Status: DISCONTINUED | OUTPATIENT
Start: 2018-07-19 | End: 2018-07-23

## 2018-07-19 RX ORDER — VANCOMYCIN HCL 1 G
1000 VIAL (EA) INTRAVENOUS ONCE
Qty: 0 | Refills: 0 | Status: COMPLETED | OUTPATIENT
Start: 2018-07-19 | End: 2018-07-19

## 2018-07-19 RX ORDER — SODIUM CHLORIDE 9 MG/ML
3 INJECTION INTRAMUSCULAR; INTRAVENOUS; SUBCUTANEOUS ONCE
Qty: 0 | Refills: 0 | Status: COMPLETED | OUTPATIENT
Start: 2018-07-19 | End: 2018-07-19

## 2018-07-19 RX ORDER — GABAPENTIN 400 MG/1
1 CAPSULE ORAL
Qty: 0 | Refills: 0 | COMMUNITY

## 2018-07-19 RX ORDER — CARVEDILOL PHOSPHATE 80 MG/1
18.75 CAPSULE, EXTENDED RELEASE ORAL EVERY 12 HOURS
Qty: 0 | Refills: 0 | Status: DISCONTINUED | OUTPATIENT
Start: 2018-07-20 | End: 2018-07-23

## 2018-07-19 RX ORDER — MOMETASONE FUROATE 220 UG/1
1 INHALANT RESPIRATORY (INHALATION) DAILY
Qty: 0 | Refills: 0 | Status: DISCONTINUED | OUTPATIENT
Start: 2018-07-19 | End: 2018-07-23

## 2018-07-19 RX ORDER — CARVEDILOL PHOSPHATE 80 MG/1
18.75 CAPSULE, EXTENDED RELEASE ORAL EVERY 12 HOURS
Qty: 0 | Refills: 0 | Status: DISCONTINUED | OUTPATIENT
Start: 2018-07-19 | End: 2018-07-19

## 2018-07-19 RX ORDER — SACUBITRIL AND VALSARTAN 24; 26 MG/1; MG/1
1 TABLET, FILM COATED ORAL
Qty: 0 | Refills: 0 | Status: DISCONTINUED | OUTPATIENT
Start: 2018-07-19 | End: 2018-07-19

## 2018-07-19 RX ORDER — PIPERACILLIN AND TAZOBACTAM 4; .5 G/20ML; G/20ML
3.38 INJECTION, POWDER, LYOPHILIZED, FOR SOLUTION INTRAVENOUS EVERY 8 HOURS
Qty: 0 | Refills: 0 | Status: DISCONTINUED | OUTPATIENT
Start: 2018-07-19 | End: 2018-07-20

## 2018-07-19 RX ORDER — FENOFIBRATE,MICRONIZED 130 MG
145 CAPSULE ORAL DAILY
Qty: 0 | Refills: 0 | Status: DISCONTINUED | OUTPATIENT
Start: 2018-07-19 | End: 2018-07-23

## 2018-07-19 RX ORDER — TRAMADOL HYDROCHLORIDE 50 MG/1
1 TABLET ORAL
Qty: 0 | Refills: 0 | COMMUNITY

## 2018-07-19 RX ORDER — PIPERACILLIN AND TAZOBACTAM 4; .5 G/20ML; G/20ML
3.38 INJECTION, POWDER, LYOPHILIZED, FOR SOLUTION INTRAVENOUS ONCE
Qty: 0 | Refills: 0 | Status: COMPLETED | OUTPATIENT
Start: 2018-07-19 | End: 2018-07-19

## 2018-07-19 RX ORDER — LANOLIN ALCOHOL/MO/W.PET/CERES
3 CREAM (GRAM) TOPICAL AT BEDTIME
Qty: 0 | Refills: 0 | Status: DISCONTINUED | OUTPATIENT
Start: 2018-07-19 | End: 2018-07-23

## 2018-07-19 RX ORDER — GABAPENTIN 400 MG/1
300 CAPSULE ORAL
Qty: 0 | Refills: 0 | Status: DISCONTINUED | OUTPATIENT
Start: 2018-07-19 | End: 2018-07-20

## 2018-07-19 RX ORDER — RIVAROXABAN 15 MG-20MG
15 KIT ORAL EVERY 24 HOURS
Qty: 0 | Refills: 0 | Status: DISCONTINUED | OUTPATIENT
Start: 2018-07-19 | End: 2018-07-23

## 2018-07-19 RX ORDER — ATORVASTATIN CALCIUM 80 MG/1
1 TABLET, FILM COATED ORAL
Qty: 0 | Refills: 0 | COMMUNITY

## 2018-07-19 RX ORDER — SODIUM CHLORIDE 9 MG/ML
1000 INJECTION INTRAMUSCULAR; INTRAVENOUS; SUBCUTANEOUS ONCE
Qty: 0 | Refills: 0 | Status: COMPLETED | OUTPATIENT
Start: 2018-07-19 | End: 2018-07-19

## 2018-07-19 RX ORDER — TAMSULOSIN HYDROCHLORIDE 0.4 MG/1
1 CAPSULE ORAL
Qty: 0 | Refills: 0 | COMMUNITY

## 2018-07-19 RX ORDER — PETROLATUM,WHITE
1 JELLY (GRAM) TOPICAL DAILY
Qty: 0 | Refills: 0 | Status: DISCONTINUED | OUTPATIENT
Start: 2018-07-19 | End: 2018-07-23

## 2018-07-19 RX ORDER — VANCOMYCIN HCL 1 G
1000 VIAL (EA) INTRAVENOUS DAILY
Qty: 0 | Refills: 0 | Status: DISCONTINUED | OUTPATIENT
Start: 2018-07-20 | End: 2018-07-20

## 2018-07-19 RX ORDER — SACUBITRIL AND VALSARTAN 24; 26 MG/1; MG/1
1 TABLET, FILM COATED ORAL
Qty: 0 | Refills: 0 | COMMUNITY

## 2018-07-19 RX ADMIN — GABAPENTIN 300 MILLIGRAM(S): 400 CAPSULE ORAL at 19:35

## 2018-07-19 RX ADMIN — PIPERACILLIN AND TAZOBACTAM 200 GRAM(S): 4; .5 INJECTION, POWDER, LYOPHILIZED, FOR SOLUTION INTRAVENOUS at 16:35

## 2018-07-19 RX ADMIN — CARVEDILOL PHOSPHATE 18.75 MILLIGRAM(S): 80 CAPSULE, EXTENDED RELEASE ORAL at 19:35

## 2018-07-19 RX ADMIN — ATORVASTATIN CALCIUM 80 MILLIGRAM(S): 80 TABLET, FILM COATED ORAL at 22:30

## 2018-07-19 RX ADMIN — SODIUM CHLORIDE 1000 MILLILITER(S): 9 INJECTION INTRAMUSCULAR; INTRAVENOUS; SUBCUTANEOUS at 16:35

## 2018-07-19 RX ADMIN — RIVAROXABAN 15 MILLIGRAM(S): KIT at 19:34

## 2018-07-19 RX ADMIN — SODIUM CHLORIDE 3 MILLILITER(S): 9 INJECTION INTRAMUSCULAR; INTRAVENOUS; SUBCUTANEOUS at 15:33

## 2018-07-19 RX ADMIN — Medication 250 MILLIGRAM(S): at 17:41

## 2018-07-19 NOTE — H&P ADULT - PMH
Acute MI    Afib    Anemia, vitamin B12 deficiency    Automatic implantable cardioverter-defibrillator in situ    CAD (coronary artery disease)    CKD (chronic kidney disease)    Colon cancer    Congestive heart failure    COPD (chronic obstructive pulmonary disease)    HTN (hypertension)    Hypertension    Insomnia    Ischemic bowel disease    Ischemic colitis, enteritis, or enterocolitis    MI (myocardial infarction)    Oxygen dependent  wears 2LNC at HS  PAD (peripheral artery disease)    Peripheral Neuropathy    Prostate cancer    Vitamin B 12 deficiency

## 2018-07-19 NOTE — H&P ADULT - NSHPPHYSICALEXAM_GEN_ALL_CORE
T(C): 37 (07-19-18 @ 12:58), Max: 37 (07-19-18 @ 12:58)  HR: 81 (07-19-18 @ 12:58) (81 - 81)  BP: 125/78 (07-19-18 @ 12:58) (125/78 - 125/78)  RR: 16 (07-19-18 @ 12:58) (16 - 16)  SpO2: 94% (07-19-18 @ 12:58) (94% - 94%)  Wt(kg): -- T(C): 37 (07-19-18 @ 12:58), Max: 37 (07-19-18 @ 12:58)  HR: 81 (07-19-18 @ 12:58) (81 - 81)  BP: 125/78 (07-19-18 @ 12:58) (125/78 - 125/78)  RR: 16 (07-19-18 @ 12:58) (16 - 16)  SpO2: 94% (07-19-18 @ 12:58) (94% - 94%)  Wt(kg): --    Physical Exam:  General: NAD  HEENT: normocephalic, atraumatic, PERRLA, extraocular muscles intact bilaterally, moist mucous membranes   Neck: Supple, nontender, no mass  Neurology: A&Ox3, moves all extremities x4, nonfocal, CN II-XII grossly intact, mild L sided facial droop, decreased strength LLE 3/5, RLE 4/5, b/l UE 5/5  Respiratory: clear to auscultation B/L, No wheezes, rales, ronchi  CV: RRR, +S1/S2, no murmurs, rubs or gallops  Abdominal: Soft, nontender, nondistended +BSx4  Extremities: No cyanosis/clubbing, + peripheral pulses, +LLE edema 2+  MSK: Normal ROM, no joint erythema or warmth, no joint swelling   Skin: warm, dry, LLE ventral erythema, with 4-5cm red granulation tissue with surrounding erythema and warmth T(C): 37 (07-19-18 @ 12:58), Max: 37 (07-19-18 @ 12:58)  HR: 81 (07-19-18 @ 12:58) (81 - 81)  BP: 125/78 (07-19-18 @ 12:58) (125/78 - 125/78)  RR: 16 (07-19-18 @ 12:58) (16 - 16)  SpO2: 94% (07-19-18 @ 12:58) (94% - 94%)  Wt(kg): --    Physical Exam:  General: NAD  HEENT: normocephalic, atraumatic, PERRLA, extraocular muscles intact bilaterally, moist mucous membranes   Neck: Supple, nontender, no mass  Neurology: A&Ox3, moves all extremities x4, nonfocal, CN II-XII grossly intact, mild L sided facial droop, decreased strength LLE 3/5, RLE 4/5, b/l UE 5/5  Respiratory: clear to auscultation B/L, No wheezes, rales, ronchi  CV: RRR, +S1/S2, no murmurs, rubs or gallops  Abdominal: Soft, nontender, nondistended +BSx4, L colostomy  Extremities: No cyanosis/clubbing, + peripheral pulses, +LLE edema 2+  MSK: Normal ROM, no joint erythema or warmth, no joint swelling   Skin: warm, dry, LLE ventral erythema, with 4-5cm red granulation tissue with surrounding erythema and warmth T(C): 37 (07-19-18 @ 12:58), Max: 37 (07-19-18 @ 12:58)  HR: 81 (07-19-18 @ 12:58) (81 - 81)  BP: 125/78 (07-19-18 @ 12:58) (125/78 - 125/78)  RR: 16 (07-19-18 @ 12:58) (16 - 16)  SpO2: 94% (07-19-18 @ 12:58) (94% - 94%)  Wt(kg): --    Physical Exam:  General: NAD  HEENT: normocephalic, atraumatic, PERRLA, extraocular muscles intact bilaterally, moist mucous membranes   Neck: Supple, nontender, no mass  Neurology: A&Ox3, moves all extremities x4, nonfocal, CN II-XII grossly intact, mild L sided facial droop, decreased strength LLE 3/5, RLE 4/5, b/l UE 5/5  Respiratory: clear to auscultation B/L, No wheezes, rales, ronchi  CV: RRR, +S1/S2, no murmurs, rubs or gallops  Abdominal: Soft, nontender, nondistended +BSx4, L colostomy  Extremities: No cyanosis/clubbing, 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   HEENT: normocephalic, atraumatic, PERRLA, extraocular muscles intact bilaterally, moist mucous membranes   Neck: Supple, nontender, no mass  Neurology: A&Ox3, moves all extremities x4, nonfocal, CN II-XII grossly intact, mild L sided facial droop, decreased strength LLE 3/5, RLE 4/5, b/l UE 5/5, no pronator drift, no tremor, no dysmetria, pupils equally symmetrical and reactive to light.   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

## 2018-07-19 NOTE — H&P ADULT - NSHPLABSRESULTS_GEN_ALL_CORE
I personally reviewed & interpreted the lab findings above;   I personally reviewed & interpreted the radiographic findings;  I personally reviewed & interpreted the EKG findings;

## 2018-07-19 NOTE — H&P ADULT - PROBLEM SELECTOR PLAN 3
BRIGETTE on CKD (Baseline Cr 1.2-1.3)  likely worse in setting of acute infection  will monitor bmp  avoid nephrotoxic drugs BRIGETTE on CKD (Baseline Cr 1.2-1.3)  likely worse in setting of acute infection  will monitor bmp  avoid nephrotoxic drugs  -monitor GFR in terms of vancomycin dosing and xarelto.

## 2018-07-19 NOTE — H&P ADULT - PROBLEM SELECTOR PLAN 2
pt noted to have facial droop and weakness of LLE  CT head negative  consider MRI  Neuro consult  Speech and swallow eval  aspiration precautions  PT consult pt noted to have facial droop and weakness of LLE  CT head negative  Neuro consult  Speech and swallow eval  aspiration precautions  PT consult pt noted to have facial droop and weakness of LLE  CT head negative  Neuro consult Dr Aggarwal  Speech and swallow eval  aspiration precautions  PT consult  if change in mental status overnight repeat CT head pt noted to have facial droop and weakness of LLE and unable to clarify exact time course. Discrepancies in stories. One family member reports noticing it weeks ago and another yesterday. If this is stroke, the left leg weakness could be 2/2 to right anterior cerebral artery involvement though head CT does not suggest acute process per radiologist. Or the weakness could just be from cellulitis.    CT head negative  Neuro consult Dr Aggarwal  Speech and swallow eval  aspiration precautions  PT consult  if change in mental status overnight repeat CT head

## 2018-07-19 NOTE — H&P ADULT - PROBLEM SELECTOR PLAN 7
h/o CHF with last EF 08/16 30%   continue entresto  monitor Is and Os  monitor for signs of fluid overload h/o CHF with last EF 08/16 30%   Hold entresto in setting of BRIGETTE   monitor Is and Os  monitor for signs of fluid overload

## 2018-07-19 NOTE — ED PROVIDER NOTE - MEDICAL DECISION MAKING DETAILS
screening septic work up, INR, BNP, procalcitonin, EKG, ESR, x-ray left tib/fib, chest x-ray, venous duplex, CT head, CT abd/pelvis, observation

## 2018-07-19 NOTE — H&P ADULT - ATTENDING COMMENTS
Patient endorsed to me by ER team for admission. My involvement in care for patient was from time of admission until 11:00PM tonight. Care to be resumed by night hospitalist and full time day hospitalist thereafter.    Patient seen and examined at bedside. Agree with the resident note above. Changes made to note above where appropriate.

## 2018-07-19 NOTE — H&P ADULT - HISTORY OF PRESENT ILLNESS
85yo M with PMH with CHF (LVEF 30% in Aug 2016) with AICD, MI s/p CABG, A-fib on Xarelto, prostate CA s/p radiation (2004), colon CA s/p chemo (2004),  Ischemic colitis with resection with L colostomy, HTN, COPD, Iron def anemia, CKD, PAD, B12 def, on 2LNC at home at night presents to the ED with L lower ext weakness and swelling. As per pts wife he had chills overnight and this am was found to be cold, clammy and had chills again. When pt tried to get out of bed he could not stand up or walk due to LLE weakness, however after a while he was able to ambulate to the kitchen where he had breakfast during and after which he was tremulous. Wife reports BP this am was low 80/55. Denies CP, palpitations, SOB, N/V/D, fevers, abd pain, urinary complaints. Pt has had swelling of his LLE since 07/04. He was scheduled for stents 3 weeks ago of the LLE however was unable to get them and was told he would need a bypass by Dr Rojas his vascular surgeon. Pt was off xarelto for 3 days prior to scheduled stent placement.    In the ED, VS /78, HR 81, RR 16, Temp 98.6, 94% on RA. Labs significant for WBC 13.96, Hb 10.8, INR 1.68, Cr 2.3, BUN 33, procalc 4.4. CT head shows Cerebral parenchymal volume loss and chronic ischemic gliotic bihemispheric cerebral white matter changes similar to prior. Chronic infarct right corona radiata reidentified. There is no acute infarct. CT abd/pelvis negative. Doppler lower ext negative. CXR trace basilar effusions. LLE Xray negative for fracture. Pt given zosyn x1, vanco x1, NS 1L bolus x1. EKG pending. 83yo M with PMH with CHF (LVEF 30% in Aug 2016) with AICD, MI s/p CABG, A-fib on Xarelto, prostate CA s/p radiation (2004), colon CA s/p chemo (2004),  Ischemic colitis with resection with L colostomy, HTN, COPD, Iron def anemia, CKD, PAD, B12 def, on 2LNC at home at night presents to the ED with L lower ext weakness and swelling. As per pts wife he had chills overnight and this am was found to be cold, clammy and had chills again. When pt tried to get out of bed he could not stand up or walk due to LLE weakness, however after a while he was able to ambulate to the kitchen where he had breakfast during and after which he was tremulous. Wife reports BP this am was low 80/55. Denies CP, palpitations, SOB, N/V/D, fevers, abd pain. Pt has had swelling of his LLE since 07/04. He was scheduled for stents 3 weeks ago of the LLE however was unable to get them and was told he would need a bypass by Dr Rojas his vascular surgeon. Pt was off xarelto for 3 days prior to scheduled stent placement. Admits to dysuria, straight caths at home 4-5 times a day.     In the ED, VS /78, HR 81, RR 16, Temp 98.6, 94% on RA. Labs significant for WBC 13.96, Hb 10.8, INR 1.68, Cr 2.3, BUN 33, procalc 4.4. CT head shows Cerebral parenchymal volume loss and chronic ischemic gliotic bihemispheric cerebral white matter changes similar to prior. Chronic infarct right corona radiata reidentified. There is no acute infarct. CT abd/pelvis negative. Doppler lower ext negative. CXR trace basilar effusions. LLE Xray negative for fracture. Pt given zosyn x1, vanco x1, NS 1L bolus x1. EKG pending.

## 2018-07-19 NOTE — ED PROVIDER NOTE - CARE PLAN
Principal Discharge DX:	Cellulitis  Assessment and plan of treatment:	admit  Secondary Diagnosis:	Leukocytosis  Secondary Diagnosis:	Acute on chronic renal insufficiency

## 2018-07-19 NOTE — ED ADULT NURSE NOTE - OBJECTIVE STATEMENT
Present to ER with c/o of left leg swelling this AM. Pt is self cath Present to ER with c/o of left leg swelling this AM. As per wife, pt has cold clammy skin and was shaking. Pt is self cath and has colostomy bag.

## 2018-07-19 NOTE — H&P ADULT - NSHPSOCIALHISTORY_GEN_ALL_CORE
lives at home with wife. Former smoker 1ppd for 20 years. No etoh or rec drugs. Ambulates with walker at home

## 2018-07-19 NOTE — H&P ADULT - PROBLEM SELECTOR PLAN 4
pt was unable to get stents LLE  will consult Vasc for further management pt was unable to get stents LLE  consulted Vasc by ED team for further management

## 2018-07-19 NOTE — CONSULT NOTE ADULT - ASSESSMENT
episode--- left leg weakness past few weeks now with possible new left face droop  left face droop possible cva was off his ac and antiplatelets --- restarted today    left leg weakness possible PVD, neuropathy cellulitis--- vasular ter of face and leg far part with no arm deficits  pt abi   called daughter and spouse no response

## 2018-07-19 NOTE — H&P ADULT - PROBLEM SELECTOR PLAN 9
Pt is on 2L NC overnight, will continue Pt is on 2L NC overnight, will continue to monitor as clinically no signs of COPD flare.

## 2018-07-19 NOTE — ED PROVIDER NOTE - PHYSICAL EXAMINATION
weakness noted to bilateral lower ext worse in the left   Left facial droop noted on exam when questioned about this pt and wife reports that this has been present for weeks weakness noted to bilateral lower ext worse in the left   Left facial droop noted on exam when questioned about this pt and wife reports that this has been present for weeks  Bilateral pitting edema noted to lower ext left greater then right, sensation intact +pedal pulse  Erythema noted to LLE most significant overlying the posterior calf with increased warmth noted

## 2018-07-19 NOTE — H&P ADULT - NSHPREVIEWOFSYSTEMS_GEN_ALL_CORE
Constitutional: denies fever, chills, diaphoresis   HEENT: denies blurry vision, difficulty hearing  Respiratory: denies SOB, TRUJILLO, cough, sputum production, wheezing, hemoptysis  Cardiovascular: denies CP, palpitations, edema  Gastrointestinal: denies nausea, vomiting, diarrhea, constipation, abdominal pain, melena, hematochezia   Genitourinary: denies dysuria, frequency, urgency, hematuria   Skin/Breast: denies rash, itching  Musculoskeletal: denies myalgias, joint swelling, +LLE weakness, +LLE swelling  Neurologic: denies headache, dizziness, paresthesias, numbness/tingling Constitutional: denies fever, chills, diaphoresis   HEENT: denies blurry vision, difficulty hearing  Respiratory: denies SOB, TRUJILLO, cough, sputum production, wheezing, hemoptysis  Cardiovascular: denies CP, palpitations, edema  Gastrointestinal: denies nausea, vomiting, diarrhea, constipation, abdominal pain, melena, hematochezia   Genitourinary: denies frequency, urgency, hematuria +dysuria  Skin/Breast: denies rash, itching  Musculoskeletal: denies myalgias, joint swelling, +LLE weakness, +LLE swelling  Neurologic: denies headache, dizziness, paresthesias, numbness/tingling Constitutional: denies fever, chills, diaphoresis   HEENT: denies blurry vision, difficulty hearing  Respiratory: denies SOB, TRUJILLO, cough, sputum production, wheezing, hemoptysis  Cardiovascular: denies CP, palpitations, edema  Gastrointestinal: denies nausea, vomiting, diarrhea, constipation, abdominal pain, melena, hematochezia   Genitourinary: denies frequency, urgency, hematuria ; +dysuria  Skin/Breast: denies rash, itching  Musculoskeletal: denies myalgias, joint swelling, +LLE weakness, +LLE swelling  Neurologic: denies headache, dizziness, paresthesias, numbness/tingling; +left leg weakness. + facial droop

## 2018-07-19 NOTE — ED PROVIDER NOTE - FAMILY HISTORY
Father  Still living? Unknown  Family history of MI (myocardial infarction), Age at diagnosis: Age Unknown  Family history of colon cancer, Age at diagnosis: Age Unknown

## 2018-07-19 NOTE — ED PROVIDER NOTE - PROGRESS NOTE DETAILS
Will admit pt for continued work up.  Not a candidate for TPA as symptoms have been ongoing for weeks per reports.   Dysphagia screen passed at bedside

## 2018-07-19 NOTE — H&P ADULT - ASSESSMENT
85yo M with PMH with CHF (LVEF 30% in Aug 2016) with AICD, MI s/p CABG, A-fib on Xarelto, prostate CA s/p radiation (2004), colon CA s/p chemo (2004),  Ischemic colitis with resection with L colostomy, HTN, COPD, Iron def anemia, CKD, PAD, B12 def, on 2LNC at home at night presents to the ED with L lower ext weakness and swelling, admitted with cellulitis LLE and evaluate for TIA vs CVA

## 2018-07-19 NOTE — H&P ADULT - PROBLEM SELECTOR PLAN 10
DVT ppx: On Xarelto  Fall precautions    11. Anemia: anemia of chronic disease, outpatient management. Will monitor H&H, currently stable DVT ppx: On Xarelto  Fall precautions    11. Anemia: anemia of chronic disease, outpatient management. Will monitor H&H, currently stable  12. Dysuria: f/u urine culture, continue straight cath prn

## 2018-07-19 NOTE — H&P ADULT - NSHPOUTPATIENTPROVIDERS_GEN_ALL_CORE
PMD Dr Zachary Alvarado, Vac Dr Chairez, Cardio Dr Moore ([facility]), Heme/onc Dr Strong PMD Dr Zachary Aguilera, Vac Dr Chairez, Cardio Dr Moore ([facility]), Heme/onc Dr Strong

## 2018-07-19 NOTE — H&P ADULT - PROBLEM SELECTOR PLAN 1
Admit to [facility]  Start IV Zosyn and Vanco  Tylenol prn for fever/pain  elevate LLE  DVT ruled out  monitor for progression  f/u blood cxs  ID consult Admit to [facility]  Start IV Zosyn and Vanco  elevate LLE  DVT ruled out  monitor for progression  f/u blood cxs  ID consult Admit to [facility]  Start IV Zosyn and Vanco   elevate LLE  DVT ruled out  +ESR, +Procalc  monitor for progression  f/u blood cxs  ID consult Dr Dan -Left leg posterior aspect is warm, tender, and erythematous and known vascular disease on that side c/w cellulitis. Based on hx and examination this is most c/w nonpurulent -Left leg posterior aspect is warm, tender, and erythematous and known vascular disease on that side c/w cellulitis. Based on hx and examination this is most c/w nonpurulent cellulitis but given this patient also had dysuria and does intermittent straight cath there is concern for superimposed UTI presumably and unable to r/o pseudomonal UTI in such case therefore would empirically do IV zosyn to target both the urine source which should also cover not only pseudomonas but potentially strep species of left leg cellulitis. Also given the complexity in vascular disease in this patient, would err on side of using IV vancomycin with careful attention to GFR w/ serial vanc trough to avoid nephrotoxicty.   -follow up bcx/ucx; prior UCX do not suggest ESBL organism.

## 2018-07-20 ENCOUNTER — TRANSCRIPTION ENCOUNTER (OUTPATIENT)
Age: 83
End: 2018-07-20

## 2018-07-20 DIAGNOSIS — R29.898 OTHER SYMPTOMS AND SIGNS INVOLVING THE MUSCULOSKELETAL SYSTEM: ICD-10-CM

## 2018-07-20 DIAGNOSIS — D72.829 ELEVATED WHITE BLOOD CELL COUNT, UNSPECIFIED: ICD-10-CM

## 2018-07-20 PROBLEM — J44.9 CHRONIC OBSTRUCTIVE PULMONARY DISEASE, UNSPECIFIED: Chronic | Status: INACTIVE | Noted: 2017-05-31 | Resolved: 2018-07-19

## 2018-07-20 LAB
ANION GAP SERPL CALC-SCNC: 9 MMOL/L — SIGNIFICANT CHANGE UP (ref 5–17)
BASOPHILS # BLD AUTO: 0.02 K/UL — SIGNIFICANT CHANGE UP (ref 0–0.2)
BASOPHILS NFR BLD AUTO: 0.2 % — SIGNIFICANT CHANGE UP (ref 0–2)
BUN SERPL-MCNC: 26 MG/DL — HIGH (ref 7–23)
CALCIUM SERPL-MCNC: 8.6 MG/DL — SIGNIFICANT CHANGE UP (ref 8.5–10.1)
CHLORIDE SERPL-SCNC: 104 MMOL/L — SIGNIFICANT CHANGE UP (ref 96–108)
CO2 SERPL-SCNC: 27 MMOL/L — SIGNIFICANT CHANGE UP (ref 22–31)
CREAT SERPL-MCNC: 1.9 MG/DL — HIGH (ref 0.5–1.3)
EOSINOPHIL # BLD AUTO: 0.09 K/UL — SIGNIFICANT CHANGE UP (ref 0–0.5)
EOSINOPHIL NFR BLD AUTO: 1 % — SIGNIFICANT CHANGE UP (ref 0–6)
GLUCOSE SERPL-MCNC: 120 MG/DL — HIGH (ref 70–99)
HCT VFR BLD CALC: 28.4 % — LOW (ref 39–50)
HGB BLD-MCNC: 9.2 G/DL — LOW (ref 13–17)
IMM GRANULOCYTES NFR BLD AUTO: 0.4 % — SIGNIFICANT CHANGE UP (ref 0–1.5)
LYMPHOCYTES # BLD AUTO: 0.69 K/UL — LOW (ref 1–3.3)
LYMPHOCYTES # BLD AUTO: 7.6 % — LOW (ref 13–44)
MCHC RBC-ENTMCNC: 31.7 PG — SIGNIFICANT CHANGE UP (ref 27–34)
MCHC RBC-ENTMCNC: 32.4 GM/DL — SIGNIFICANT CHANGE UP (ref 32–36)
MCV RBC AUTO: 97.9 FL — SIGNIFICANT CHANGE UP (ref 80–100)
MONOCYTES # BLD AUTO: 1.3 K/UL — HIGH (ref 0–0.9)
MONOCYTES NFR BLD AUTO: 14.2 % — HIGH (ref 2–14)
NEUTROPHILS # BLD AUTO: 6.99 K/UL — SIGNIFICANT CHANGE UP (ref 1.8–7.4)
NEUTROPHILS NFR BLD AUTO: 76.6 % — SIGNIFICANT CHANGE UP (ref 43–77)
PLATELET # BLD AUTO: 187 K/UL — SIGNIFICANT CHANGE UP (ref 150–400)
POTASSIUM SERPL-MCNC: 3.9 MMOL/L — SIGNIFICANT CHANGE UP (ref 3.5–5.3)
POTASSIUM SERPL-SCNC: 3.9 MMOL/L — SIGNIFICANT CHANGE UP (ref 3.5–5.3)
RBC # BLD: 2.9 M/UL — LOW (ref 4.2–5.8)
RBC # FLD: 15.3 % — HIGH (ref 10.3–14.5)
SODIUM SERPL-SCNC: 140 MMOL/L — SIGNIFICANT CHANGE UP (ref 135–145)
URATE SERPL-MCNC: 5.7 MG/DL — SIGNIFICANT CHANGE UP (ref 3.4–8.8)
WBC # BLD: 9.13 K/UL — SIGNIFICANT CHANGE UP (ref 3.8–10.5)
WBC # FLD AUTO: 9.13 K/UL — SIGNIFICANT CHANGE UP (ref 3.8–10.5)

## 2018-07-20 PROCEDURE — 93010 ELECTROCARDIOGRAM REPORT: CPT

## 2018-07-20 PROCEDURE — 99233 SBSQ HOSP IP/OBS HIGH 50: CPT

## 2018-07-20 RX ORDER — GABAPENTIN 400 MG/1
100 CAPSULE ORAL
Qty: 0 | Refills: 0 | Status: DISCONTINUED | OUTPATIENT
Start: 2018-07-20 | End: 2018-07-23

## 2018-07-20 RX ADMIN — GABAPENTIN 300 MILLIGRAM(S): 400 CAPSULE ORAL at 07:19

## 2018-07-20 RX ADMIN — PIPERACILLIN AND TAZOBACTAM 25 GRAM(S): 4; .5 INJECTION, POWDER, LYOPHILIZED, FOR SOLUTION INTRAVENOUS at 00:07

## 2018-07-20 RX ADMIN — CARVEDILOL PHOSPHATE 18.75 MILLIGRAM(S): 80 CAPSULE, EXTENDED RELEASE ORAL at 07:20

## 2018-07-20 RX ADMIN — TRAMADOL HYDROCHLORIDE 25 MILLIGRAM(S): 50 TABLET ORAL at 20:35

## 2018-07-20 RX ADMIN — CARVEDILOL PHOSPHATE 18.75 MILLIGRAM(S): 80 CAPSULE, EXTENDED RELEASE ORAL at 20:36

## 2018-07-20 RX ADMIN — Medication 1 APPLICATION(S): at 11:30

## 2018-07-20 RX ADMIN — GABAPENTIN 100 MILLIGRAM(S): 400 CAPSULE ORAL at 17:55

## 2018-07-20 RX ADMIN — Medication 81 MILLIGRAM(S): at 11:30

## 2018-07-20 RX ADMIN — Medication 3 MILLIGRAM(S): at 22:31

## 2018-07-20 RX ADMIN — TRAMADOL HYDROCHLORIDE 25 MILLIGRAM(S): 50 TABLET ORAL at 21:35

## 2018-07-20 RX ADMIN — Medication 145 MILLIGRAM(S): at 11:30

## 2018-07-20 RX ADMIN — ATORVASTATIN CALCIUM 80 MILLIGRAM(S): 80 TABLET, FILM COATED ORAL at 22:31

## 2018-07-20 RX ADMIN — MOMETASONE FUROATE 1 PUFF(S): 220 INHALANT RESPIRATORY (INHALATION) at 17:21

## 2018-07-20 RX ADMIN — RIVAROXABAN 15 MILLIGRAM(S): KIT at 17:20

## 2018-07-20 RX ADMIN — PIPERACILLIN AND TAZOBACTAM 25 GRAM(S): 4; .5 INJECTION, POWDER, LYOPHILIZED, FOR SOLUTION INTRAVENOUS at 07:18

## 2018-07-20 NOTE — DISCHARGE NOTE ADULT - PLAN OF CARE
Continue your carvedilol and Entresto. Follow up with your cardiologist in a week. Follow up with nephrologist in a week and recheck your renal function. Your creatinine is currently 1.6. Your BUN is 37. You may follow up with Dr. Pillai (phone number below) who saw you in the hospital, if you wish.  Decrease your dose of gabapentin to 100mg twice daily as prescribed given your kidney disease. Follow up with your vascular surgeon as outpatient. Continue your aspirin and lipitor. Continue your aspirin and lipitor. Follow up with your cardiologist. There was a question if you may have had neurological symptoms when you came in and if you wish you may follow up with neurologist as outpatient. Dr. Sood's phone number is below. resolution You likely had a urinary tract infection. Please continue to take the prescribed antibiotic, cefuroxime twice daily for 8 days. Start first dose tomorrow (07/24/18) morning. Follow up with your PMD or the infectious disease physician (Dr. Almaraz, phone number below), in one week. If you have fever, shivers, or other new symptoms, call your doctor. You may have had cellulitis of your left leg. That has improved. Continue antibiotics with cefuroxime as described above.

## 2018-07-20 NOTE — DISCHARGE NOTE ADULT - CARE PLAN
Principal Discharge DX:	UTI (urinary tract infection)  Goal:	resolution  Assessment and plan of treatment:	You likely had a urinary tract infection. Please continue to take the prescribed antibiotic, cefuroxime twice daily for 8 days. Start first dose tomorrow (07/24/18) morning. Follow up with your PMD or the infectious disease physician (Dr. Almaraz, phone number below), in one week. If you have fever, shivers, or other new symptoms, call your doctor.  Secondary Diagnosis:	Cellulitis  Assessment and plan of treatment:	You may have had cellulitis of your left leg. That has improved. Continue antibiotics with cefuroxime as described above.  Secondary Diagnosis:	Congestive heart failure  Assessment and plan of treatment:	Continue your carvedilol and Entresto. Follow up with your cardiologist in a week.  Secondary Diagnosis:	CKD (chronic kidney disease), stage III  Assessment and plan of treatment:	Follow up with nephrologist in a week and recheck your renal function. Your creatinine is currently 1.6. Your BUN is 37. You may follow up with Dr. Pillai (phone number below) who saw you in the hospital, if you wish.  Decrease your dose of gabapentin to 100mg twice daily as prescribed given your kidney disease.  Secondary Diagnosis:	PVD (peripheral vascular disease)  Assessment and plan of treatment:	Follow up with your vascular surgeon as outpatient. Continue your aspirin and lipitor.  Secondary Diagnosis:	CAD (coronary artery disease)  Assessment and plan of treatment:	Continue your aspirin and lipitor. Follow up with your cardiologist.  Secondary Diagnosis:	Weakness  Assessment and plan of treatment:	There was a question if you may have had neurological symptoms when you came in and if you wish you may follow up with neurologist as outpatient. Dr. Sood's phone number is below.

## 2018-07-20 NOTE — PHYSICAL THERAPY INITIAL EVALUATION ADULT - GENERAL OBSERVATIONS, REHAB EVAL
Pt rec'd in bed with a friend present. Two digits amputated from right foot. Pt alert and oriented. Agreeable to PT evaluation. Drooping of left face, left LE swollen.

## 2018-07-20 NOTE — PHYSICAL THERAPY INITIAL EVALUATION ADULT - ADDITIONAL COMMENTS
Pt states he was previously ambulating with a RW or a cane inside the home and used a WC for longer distances. Pt lives with his wife and they have no stairs. Pt on O2 at night. States wife helps him to get dressed and takes care of him.

## 2018-07-20 NOTE — DISCHARGE NOTE ADULT - REASON FOR ADMISSION
pt came to ER because he thought he was having a heart problem. left leg redness and swelling ; dysuria

## 2018-07-20 NOTE — CONSULT NOTE ADULT - PROBLEM SELECTOR RECOMMENDATION 9
At this point the precipitant driving this acute change is not clear. Patient did have leukocytosis with 7% bands on admission and his normal low baseline procalcitonin is now at 4.4. Despite cough and abn pulm exam CXR is currently clear and a viral URI would not usually be associated with an elevated procalcitonin.  At this point it is not clear that there is an acute bacterial process so will stop abx at this point and observe off abx. If any micro data is revealing we can restart abx directed at any isolated pathogen that makes sense in this context.

## 2018-07-20 NOTE — CONSULT NOTE ADULT - ASSESSMENT
83yo M with PMH with CHF (LVEF 30% in Aug 2016) with AICD, MI s/p CABG, A-fib on Xarelto, prostate CA s/p radiation (2004), colon CA s/p chemo (2004),  Ischemic colitis with resection with L colostomy, HTN, COPD, Iron def anemia, CKD, PAD, B12 def, on 2LNC at home at night presents to the ED with L lower ext weakness and swelling, leukocytosis, no fever, some crackles and cough but clear CXR

## 2018-07-20 NOTE — SWALLOW BEDSIDE ASSESSMENT ADULT - COMMENTS
Pt alert, oriented, cooperative. Pt admitted with cellulitis, r/o CVA left sided facial droop. Pt reported old TIA but he does not remember when. Pt's swallowing function is WFL and he is safely tolerating regular consistencies with thin liquids without any s/s of aspiration. Recommend regular consistencies with thin liquids. Discussed with RN, MD, pt.

## 2018-07-20 NOTE — CONSULT NOTE ADULT - SUBJECTIVE AND OBJECTIVE BOX
Patient is a 84y old  Male who presents with a chief complaint of pt came to ER because he thought he was having a heart problem. (2018 09:55)   Acute  renal failure on CKD 3 likely    HPI:  85yo M with PMH with CHF (LVEF 30% in Aug 2016) with AICD, MI s/p CABG, A-fib on Xarelto, prostate CA s/p radiation (), colon CA s/p chemo (),  Ischemic colitis with resection with L colostomy, HTN, COPD, Iron def anemia, CKD, PAD, B12 def, on 2LNC at home at night presents to the ED with L lower ext weakness and swelling. As per pts wife he had chills overnight and this am was found to be cold, clammy and had chills again. When pt tried to get out of bed he could not stand up or walk due to LLE weakness, however after a while he was able to ambulate to the kitchen where he had breakfast during and after which he was tremulous. Wife reports BP this am was low 80/55. Denies CP, palpitations, SOB, N/V/D, fevers, abd pain. Pt has had swelling of his LLE since . He was scheduled for stents 3 weeks ago of the LLE however was unable to get them and was told he would need a bypass by Dr Rojas his vascular surgeon. Pt was off xarelto for 3 days prior to scheduled stent placement. Admits to dysuria, straight caths at home 4-5 times a day.     In the ED, VS /78, HR 81, RR 16, Temp 98.6, 94% on RA. Labs significant for WBC 13.96, Hb 10.8, INR 1.68, Cr 2.3, BUN 33, procalc 4.4. CT head shows Cerebral parenchymal volume loss and chronic ischemic gliotic bihemispheric cerebral white matter changes similar to prior. Chronic infarct right corona radiata reidentified. There is no acute infarct. CT abd/pelvis negative. Doppler lower ext negative. CXR trace basilar effusions. LLE Xray negative for fracture. Pt given zosyn x1, vanco x1, NS 1L bolus x1. EKG pending. (2018 16:23)    PAST MEDICAL & SURGICAL HISTORY:  Oxygen dependent: wears 2LNC at HS  Ischemic bowel disease  Colon cancer  Automatic implantable cardioverter-defibrillator in situ  Acute MI  Hypertension  Congestive heart failure  Insomnia  Anemia, vitamin B12 deficiency  HTN (hypertension)  Ischemic colitis, enteritis, or enterocolitis  Vitamin B 12 deficiency  Prostate cancer  Afib  CKD (chronic kidney disease)  MI (myocardial infarction)  PAD (peripheral artery disease)  CAD (coronary artery disease)  Peripheral Neuropathy  COPD (chronic obstructive pulmonary disease)  Colostomy care  Cataract extraction status of eye, right  S/P colostomy  S/P cardiac pacemaker procedure: AICD  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  S/P small bowel resection  S/P colon resection  S/P cholecystectomy  S/P bypass graft of extremity: Left to right femoral-femoral artery bypass graft 10 years ago  S/P CABG x 3    FAMILY HISTORY:  Family history of colon cancer (Father)  Family history of MI (myocardial infarction) (Father)    Social History: Non smoker    MEDICATIONS  (STANDING):  AQUAPHOR (petrolatum Ointment) 1 Application(s) Topical daily  aspirin enteric coated 81 milliGRAM(s) Oral daily  atorvastatin 80 milliGRAM(s) Oral at bedtime  carvedilol 18.75 milliGRAM(s) Oral every 12 hours  fenofibrate Tablet 145 milliGRAM(s) Oral daily  gabapentin 300 milliGRAM(s) Oral two times a day  mometasone 220 MICROgram(s) Inhaler 1 Puff(s) Inhalation daily  rivaroxaban 15 milliGRAM(s) Oral every 24 hours    MEDICATIONS  (PRN):  acetaminophen   Tablet. 650 milliGRAM(s) Oral every 6 hours PRN Mild Pain (1 - 3)  melatonin 3 milliGRAM(s) Oral at bedtime PRN Insomnia  traMADol 25 milliGRAM(s) Oral daily PRN Moderate Pain (4 - 6)   Meds reviewed    Allergies  No Known Allergies, Intolerances    REVIEW OF SYSTEMS:    CONSTITUTIONAL:  sob, weight gain, feels weak  EYES: No eye pain, visual disturbances, or discharge  ENMT:  No difficulty hearing, tinnitus, vertigo; No sinus or throat pain  NECK: No pain or stiffness  BREASTS: No pain, masses, or nipple discharge  RESPIRATORY: sob  CARDIOVASCULAR: No chest pain, palpitations, dizziness,   GASTROINTESTINAL: No abdominal or epigastric pain. No nausea, vomiting, or hematemesis; No diarrhea or constipation. No melena or hematochezia.Trunckal obesity  GENITOURINARY: No dysuria, frequency, hematuria, or incontinence  NEUROLOGICAL: No headaches, memory loss, loss of strength, numbness, or tremors  SKIN: Diffuse erythema, no blisters  LYMPH NODES: No enlarged glands  ENDOCRINE: No heat or cold intolerance; No hair loss  MUSCULOSKELETAL: Chronic lymphedema legs with scars  PSYCHIATRIC: No depression, anxiety, mood swings, or difficulty sleeping  HEME/LYMPH: No easy bruising, or bleeding gums  ALLERY AND IMMUNOLOGIC: No hives or eczema    Vital Signs Last 24 Hrs  T(C): 36.3 (2018 13:59), Max: 36.9 (2018 05:26)  T(F): 97.4 (2018 13:59), Max: 98.5 (2018 05:26)  HR: 61 (2018 13:59) (61 - 85)  BP: 133/70 (2018 13:59) (117/62 - 138/75)  BP(mean): --  RR: 16 (2018 13:59) (16 - 18)  SpO2: 98% (2018 13:59) (95% - 98%)  Daily Height in cm: 185.42 (2018 02:36)    Daily Weight in k.2 (2018 09:55)    PHYSICAL EXAM:    GENERAL: appears chronically ill, oriented.  HEAD:  Atraumatic, Normocephalic  EYES: EOMI, PERRLA, conjunctiva and sclera clear  ENMT: No tonsillar erythema, exudates, or enlargement; Moist mucous membranes, Good dentition, No lesions  NECK: Supple, neck  veins full  NERVOUS SYSTEM:  Alert & Oriented X3, Good concentration; Motor Strength wnl upper and lower extremities;CHEST/LUNG: Clear to percussion bilaterally; No rales, rhonchi, wheezing, or rubs  HEART: Regular rate and rhythm; No murmurs, rubs, or gallops  ABDOMEN: Soft, Nontender, Nondistended; Bowel sounds present, body wall and flank edema  EXTREMITIES:  +2 - +3 leg edema with sligt erythema edema  LYMPH: No lymphadenopathy noted  SKIN: No rashes or lesions, pale    LABS:                        9.2    9.13  )-----------( 187      ( 2018 08:25 )             28.4     07-20    140  |  104  |  26<H>  ----------------------------<  120<H>  3.9   |  27  |  1.90<H>    Ca    8.6      2018 08:25    TPro  6.8  /  Alb  3.2<L>  /  TBili  0.6  /  DBili  x   /  AST  22  /  ALT  14  /  AlkPhos  36<L>      PT/INR - ( 2018 14:15 )   PT: 18.5 sec;   INR: 1.68 ratio         PTT - ( 2018 14:15 )  PTT:33.4 sec  Urinalysis Basic - ( 2018 15:18 )    Color: Yellow / Appearance: Clear / S.015 / pH: x  Gluc: x / Ketone: Negative  / Bili: Negative / Urobili: Negative   Blood: x / Protein: 25 mg/dL / Nitrite: Negative   Leuk Esterase: Moderate / RBC: 6-10 /HPF / WBC 26-50   Sq Epi: x / Non Sq Epi: Moderate / Bacteria: Many      TREND Hb Hemoglobin: 9.2 ( @ 08:25)  Hemoglobin: 10.8 ( @ 14:15)    Trend Potassium Potassium, Serum: 3.9 ( @ 08:25)  Potassium, Serum: 4.0 ( @ 14:15)    RADIOLOGY & ADDITIONAL TESTS: Patient is a 84y old  Male who presents with a chief complaint of pt came to ER because he thought he was having a heart problem. (2018 09:55)   Acute  renal failure on CKD 3 likely    HPI:  85yo M with PMH with CHF (LVEF 30% in Aug 2016) with AICD, MI s/p CABG, A-fib on Xarelto, prostate CA s/p radiation (), colon CA s/p chemo (),  Ischemic colitis with resection with L colostomy, HTN, COPD, Iron def anemia, CKD, PAD, B12 def, on 2LNC at home at night presents to the ED with L lower ext weakness and swelling. As per pts wife he had chills overnight and this am was found to be cold, clammy and had chills again. When pt tried to get out of bed he could not stand up or walk due to LLE weakness, however after a while he was able to ambulate to the kitchen where he had breakfast during and after which he was tremulous. Wife reports BP this am was low 80/55. Denies CP, palpitations, SOB, N/V/D, fevers, abd pain. Pt has had swelling of his LLE since . He was scheduled for stents 3 weeks ago of the LLE however was unable to get them and was told he would need a bypass by Dr Rojas his vascular surgeon. Pt was off xarelto for 3 days prior to scheduled stent placement. Admits to dysuria, straight caths at home 4-5 times a day.     In the ED, VS /78, HR 81, RR 16, Temp 98.6, 94% on RA. Labs significant for WBC 13.96, Hb 10.8, INR 1.68, Cr 2.3, BUN 33, procalc 4.4. CT head shows Cerebral parenchymal volume loss and chronic ischemic gliotic bihemispheric cerebral white matter changes similar to prior. Chronic infarct right corona radiata reidentified. There is no acute infarct. CT abd/pelvis negative. Doppler lower ext negative. CXR trace basilar effusions. LLE Xray negative for fracture. Pt given zosyn x1, vanco x1, NS 1L bolus x1. EKG pending. (2018 16:23)    PAST MEDICAL & SURGICAL HISTORY:  Oxygen dependent: wears 2LNC at HS  Ischemic bowel disease  Colon cancer  Automatic implantable cardioverter-defibrillator in situ  Acute MI  Hypertension  Congestive heart failure  Insomnia  Anemia, vitamin B12 deficiency  HTN (hypertension)  Ischemic colitis, enteritis, or enterocolitis  Vitamin B 12 deficiency  Prostate cancer  Afib  CKD (chronic kidney disease)  MI (myocardial infarction)  PAD (peripheral artery disease)  CAD (coronary artery disease)  Peripheral Neuropathy  COPD (chronic obstructive pulmonary disease)  Colostomy care  Cataract extraction status of eye, right  S/P colostomy  S/P cardiac pacemaker procedure: AICD  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  S/P small bowel resection  S/P colon resection  S/P cholecystectomy  S/P bypass graft of extremity: Left to right femoral-femoral artery bypass graft 10 years ago  S/P CABG x 3    FAMILY HISTORY:  Family history of colon cancer (Father)  Family history of MI (myocardial infarction) (Father)    Social History: Non smoker    MEDICATIONS  (STANDING):  AQUAPHOR (petrolatum Ointment) 1 Application(s) Topical daily  aspirin enteric coated 81 milliGRAM(s) Oral daily  atorvastatin 80 milliGRAM(s) Oral at bedtime  carvedilol 18.75 milliGRAM(s) Oral every 12 hours  fenofibrate Tablet 145 milliGRAM(s) Oral daily  gabapentin 300 milliGRAM(s) Oral two times a day  mometasone 220 MICROgram(s) Inhaler 1 Puff(s) Inhalation daily  rivaroxaban 15 milliGRAM(s) Oral every 24 hours    MEDICATIONS  (PRN):  acetaminophen   Tablet. 650 milliGRAM(s) Oral every 6 hours PRN Mild Pain (1 - 3)  melatonin 3 milliGRAM(s) Oral at bedtime PRN Insomnia  traMADol 25 milliGRAM(s) Oral daily PRN Moderate Pain (4 - 6)   Meds reviewed    Allergies  No Known Allergies, Intolerances    REVIEW OF SYSTEMS:    CONSTITUTIONAL:  sob, weight gain, feels weak  EYES: No eye pain, visual disturbances, or discharge  ENMT:  No difficulty hearing, tinnitus, vertigo; No sinus or throat pain  NECK: No pain or stiffness  BREASTS: No pain, masses, or nipple discharge  RESPIRATORY: sob  CARDIOVASCULAR: No chest pain, palpitations, dizziness,   GASTROINTESTINAL: No abdominal or epigastric pain. No nausea, vomiting, or hematemesis; No diarrhea or constipation. No melena or hematochezia.Trunckal obesity  GENITOURINARY: No dysuria, frequency, hematuria, or incontinence  NEUROLOGICAL: No headaches, memory loss, loss of strength, numbness, or tremors  SKIN: Diffuse erythema, no blisters  LYMPH NODES: No enlarged glands  ENDOCRINE: No heat or cold intolerance; No hair loss  MUSCULOSKELETAL: Chronic lymphedema legs with scars  PSYCHIATRIC: No depression, anxiety, mood swings, or difficulty sleeping  HEME/LYMPH: No easy bruising, or bleeding gums  ALLERY AND IMMUNOLOGIC: No hives or eczema    Vital Signs Last 24 Hrs  T(C): 36.3 (2018 13:59), Max: 36.9 (2018 05:26)  T(F): 97.4 (2018 13:59), Max: 98.5 (2018 05:26)  HR: 61 (2018 13:59) (61 - 85)  BP: 133/70 (2018 13:59) (117/62 - 138/75)  BP(mean): --  RR: 16 (2018 13:59) (16 - 18)  SpO2: 98% (2018 13:59) (95% - 98%)  Daily Height in cm: 185.42 (2018 02:36)    Daily Weight in k.2 (2018 09:55)    PHYSICAL EXAM:    GENERAL: appears chronically ill, oriented.  HEAD:  Atraumatic, Normocephalic  EYES: EOMI, PERRLA, conjunctiva and sclera clear  ENMT: No tonsillar erythema, exudates, or enlargement; Moist mucous membranes, Good dentition, No lesions  NECK: Supple, neck  veins full  NERVOUS SYSTEM:  Alert & Oriented X3, Good concentration; Motor Strength wnl upper and lower extremities;CHEST/LUNG: Clear to percussion bilaterally; No rales, rhonchi, wheezing, or rubs  HEART: Regular rate and rhythm; No murmurs, rubs, or gallops  ABDOMEN: Soft, Nontender, Nondistended; Bowel sounds present, body wall and flank edema  EXTREMITIES:  +2 - +3 leg edema with sligt erythema LEFT > right edema  LYMPH: No lymphadenopathy noted  SKIN: No rashes or lesions, pale    LABS:                        9.2    9.13  )-----------( 187      ( 2018 08:25 )             28.4     07-20    140  |  104  |  26<H>  ----------------------------<  120<H>  3.9   |  27  |  1.90<H>    Ca    8.6      2018 08:25    TPro  6.8  /  Alb  3.2<L>  /  TBili  0.6  /  DBili  x   /  AST  22  /  ALT  14  /  AlkPhos  36<L>      PT/INR - ( 2018 14:15 )   PT: 18.5 sec;   INR: 1.68 ratio         PTT - ( 2018 14:15 )  PTT:33.4 sec  Urinalysis Basic - ( 2018 15:18 )    Color: Yellow / Appearance: Clear / S.015 / pH: x  Gluc: x / Ketone: Negative  / Bili: Negative / Urobili: Negative   Blood: x / Protein: 25 mg/dL / Nitrite: Negative   Leuk Esterase: Moderate / RBC: 6-10 /HPF / WBC 26-50   Sq Epi: x / Non Sq Epi: Moderate / Bacteria: Many      TREND Hb Hemoglobin: 9.2 ( @ 08:25)  Hemoglobin: 10.8 ( @ 14:15)    Trend Potassium Potassium, Serum: 3.9 ( @ 08:25)  Potassium, Serum: 4.0 ( @ 14:15)    RADIOLOGY & ADDITIONAL TESTS:

## 2018-07-20 NOTE — CONSULT NOTE ADULT - SUBJECTIVE AND OBJECTIVE BOX
HPI:  83yo M with PMH with CHF (LVEF 30% in Aug 2016) with AICD, MI s/p CABG, A-fib on Xarelto, prostate CA s/p radiation (2004), colon CA s/p chemo (2004),  Ischemic colitis with resection with L colostomy, HTN, COPD, Iron def anemia, CKD, PAD, B12 def, on 2LNC at home at night presents to the ED with L lower ext weakness and swelling. As per pts wife he had chills overnight and this am was found to be cold, clammy and had chills again. When pt tried to get out of bed he could not stand up or walk due to LLE weakness, however after a while he was able to ambulate to the kitchen where he had breakfast during and after which he was tremulous. Wife reports BP this am was low 80/55. Denies CP, palpitations, SOB, N/V/D, fevers, abd pain. Pt has had swelling of his LLE since . He was scheduled for stents 3 weeks ago of the LLE however was unable to get them and was told he would need a bypass by Dr Rojas his vascular surgeon. Pt was off xarelto for 3 days prior to scheduled stent placement. Admits to dysuria, straight caths at home 4-5 times a day.     In the ED, VS /78, HR 81, RR 16, Temp 98.6, 94% on RA. Labs significant for WBC 13.96, Hb 10.8, INR 1.68, Cr 2.3, BUN 33, procalc 4.4. CT head shows Cerebral parenchymal volume loss and chronic ischemic gliotic bihemispheric cerebral white matter changes similar to prior. Chronic infarct right corona radiata reidentified. There is no acute infarct. CT abd/pelvis negative. Doppler lower ext negative. CXR trace basilar effusions. LLE Xray negative for fracture. Pt given zosyn x1, vanco x1, NS 1L bolus x1. EKG pending. (2018 16:23)    When I see patient and question him further he reports no urinary symptoms but does report a productive cough.      PAST MEDICAL & SURGICAL HISTORY:  Oxygen dependent: wears 2LNC at HS  Ischemic bowel disease  Colon cancer  Automatic implantable cardioverter-defibrillator in situ  Acute MI  Hypertension  Congestive heart failure  Insomnia  Anemia, vitamin B12 deficiency  HTN (hypertension)  Ischemic colitis, enteritis, or enterocolitis  Vitamin B 12 deficiency  Prostate cancer  Afib  CKD (chronic kidney disease)  MI (myocardial infarction)  PAD (peripheral artery disease)  CAD (coronary artery disease)  Peripheral Neuropathy  COPD (chronic obstructive pulmonary disease)  Colostomy care  Cataract extraction status of eye, right  S/P colostomy  S/P cardiac pacemaker procedure: AICD  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 3.375 Gram(s) IV Intermittent every 8 hours  vancomycin  IVPB 1000 milliGRAM(s) IV Intermittent daily      Immunological      Other  acetaminophen   Tablet. 650 milliGRAM(s) Oral every 6 hours PRN  AQUAPHOR (petrolatum Ointment) 1 Application(s) Topical daily  aspirin enteric coated 81 milliGRAM(s) Oral daily  atorvastatin 80 milliGRAM(s) Oral at bedtime  carvedilol 18.75 milliGRAM(s) Oral every 12 hours  fenofibrate Tablet 145 milliGRAM(s) Oral daily  gabapentin 300 milliGRAM(s) Oral two times a day  melatonin 3 milliGRAM(s) Oral at bedtime PRN  mometasone 220 MICROgram(s) Inhaler 1 Puff(s) Inhalation daily  rivaroxaban 15 milliGRAM(s) Oral every 24 hours  traMADol 25 milliGRAM(s) Oral daily PRN      Allergies    No Known Allergies    Intolerances      SOCIAL HISTORY: prior tobacco, prior ETOH, no current toxic habits    FAMILY HISTORY:  Family history of colon cancer (Father)  Family history of MI (myocardial infarction) (Father)      ROS:    EYES:  Negative  blurry vision or double vision  GASTROINTESTINAL:  Negative for nausea, vomiting, diarrhea  -otherwise negative except for subjective    Vital Signs Last 24 Hrs  T(C): 36.9 (2018 05:26), Max: 37 (2018 12:58)  T(F): 98.5 (2018 05:26), Max: 98.6 (2018 12:58)  HR: 66 (2018 07:00) (63 - 85)  BP: 122/66 (2018 07:00) (117/62 - 138/75)  BP(mean): --  RR: 18 (2018 07:00) (16 - 18)  SpO2: 95% (2018 05:26) (94% - 95%)    PE:  WDWN in no distress  HEENT:  NC, PERRL, sclerae anicteric, conjunctivae clear, EOMI.  Sinuses nontender, no nasal exudate.  No buccal or pharyngeal lesions, erythema or exudate  Neck:  Supple, no adenopathy  Lungs:  No accessory muscle use, noted crackles in RUL  Cor:  RRR, S1, S2, no murmur appreciated  Abd:  Symmetric, normoactive BS.  Soft, nontender, no masses, guarding or rebound.  Liver and spleen not enlarged, ostomy bag with normal stool  Extrem:  No cyanosis, asymmetry in terms of girth with left larger than right and a chronic darkened area on left leg still present  Skin:  No rashes.  Neuro: grossly intact  Musc: moving all limbs freely, no focal deficits    LABS:                        9.2    9.13  )-----------( 187      ( 2018 08:25 )             28.4       WBC Count: 9.13 K/uL (18 @ 08:25)  WBC Count: 13.96 K/uL (18 @ 14:15)          136  |  100  |  33<H>  ----------------------------<  111<H>  4.0   |  28  |  2.30<H>    Ca    9.1      2018 14:15    TPro  6.8  /  Alb  3.2<L>  /  TBili  0.6  /  DBili  x   /  AST  22  /  ALT  14  /  AlkPhos  36<L>        Creatinine, Serum: 2.30 mg/dL (18 @ 14:15)      Urinalysis Basic - ( 2018 15:18 )    Color: Yellow / Appearance: Clear / S.015 / pH: x  Gluc: x / Ketone: Negative  / Bili: Negative / Urobili: Negative   Blood: x / Protein: 25 mg/dL / Nitrite: Negative   Leuk Esterase: Moderate / RBC: 6-10 /HPF / WBC 26-50   Sq Epi: x / Non Sq Epi: Moderate / Bacteria: Many    Procalcitonin, Serum: 4.40 (18 @ 14:15)    Procalcitonin, Serum: <0.05:  (18 @ 02:20)        MICROBIOLOGY:      RADIOLOGY & ADDITIONAL STUDIES:    --< from: CT Abdomen and Pelvis No Cont (18 @ 16:14) >    EXAM:  CT ABDOMEN AND PELVIS                            PROCEDURE DATE:  2018          INTERPRETATION:  CLINICAL INFORMATION: Elevated creatinine, fever, chills.    COMPARISON: 2017.    PROCEDURE:   CT of the Abdomen and Pelvis was performed without intravenous contrast.   Intravenous contrast: None.  Oral contrast: None.  Sagittal and coronal reformats were performed.    FINDINGS:    LOWER CHEST: Partially imaged cardiac pacemaker. Small bilateral pleural   effusions with compressive atelectasis. Unchanged right lower lobe nodule   (series 2, image 11), measuring 0.9 cm.    LIVER: Within normal limits.  BILE DUCTS: Normal caliber.   GALLBLADDER: Status post cholecystectomy.  SPLEEN: Calcified splenic granulomas.  PANCREAS: Within normal limits.  ADRENALS: Within normal limits.  KIDNEYS/URETERS: Left renal cysts. No hydronephrosis.     BLADDER: Within normal limits.  REPRODUCTIVE ORGANS: The prostate gland is not enlarged. Penile   prosthesis with reservoir in the right hemipelvis.    BOWEL/ABDOMINAL WALL: Left lower quadrant colostomy with parastomal   hernia. Right hemicolectomy with ileocolic anastomosis. Periumbilical   hernia containing unobstructed small bowel.     PERITONEUM: No ascites.  VESSELS:  Atherosclerotic change of the abdominal aorta and its branches.   Femorofemoral bypass graft.  RETROPERITONEUM: No lymphadenopathy.    BONES: Degenerative changes of the spine. Sternotomy. Intramedullary jose luis   and gamma nail in the left hip.    IMPRESSION: No acute intra-abdominal pathology.      < from: Xray Chest 1 View- PORTABLE-Urgent (18 @ 15:00) >    EXAM:  XR CHEST PORTABLE URGENT 1V                            PROCEDURE DATE:  2018          INTERPRETATION:  Fever, chills.    AP chest. Prior 2018.    Status post CABG. Left cardiac pacer in position. Left cardiac pacer   reidentified in situ. Mild nonspecific bibasilar interstitial prominence   similar to prior. No focal consolidation. Cardiomegaly despite AP   projection unchanged. Possible trace basilar effusions.    < end of copied text >

## 2018-07-20 NOTE — PHYSICAL THERAPY INITIAL EVALUATION ADULT - PERTINENT HX OF CURRENT PROBLEM, REHAB EVAL
Per H&P: "presents to the ED with L lower ext weakness and swelling. As per pts wife he had chills overnight and this am was found to be cold, clammy and had chills again. When pt tried to get out of bed he could not stand up or walk due to LLE weakness, however after a while he was able to ambulate to the kitchen where he had breakfast during and after which he was tremulous. Wife reports BP this am was low 80/55.Pt has had swelling of his LLE since 07/04."

## 2018-07-20 NOTE — DISCHARGE NOTE ADULT - PROVIDER TOKENS
TOKEN:'[id number]' TOKEN:'[id number]:'[id number]:'[id number]'

## 2018-07-20 NOTE — CONSULT NOTE ADULT - PROBLEM SELECTOR RECOMMENDATION 4
per medicine/pulm.      Thank you for consulting us and involving us in the management of this most interesting and challenging case.     We will follow along in the care of this patient.

## 2018-07-20 NOTE — DISCHARGE NOTE ADULT - HOSPITAL COURSE
83yo M with PMH with CHF (LVEF 30% in Aug 2016) with AICD, MI s/p CABG, A-fib on Xarelto, prostate CA s/p radiation (2004), colon CA s/p chemo (2004),  Ischemic colitis with resection with L colostomy, HTN, COPD, Iron def anemia, CKD, PAD, B12 def, on 2LNC at home at night presents to the ED with L lower ext weakness and swelling. As per pts wife he had chills overnight and this am was found to be cold, clammy and had chills again. When pt tried to get out of bed he could not stand up or walk due to LLE weakness, however after a while he was able to ambulate to the kitchen where he had breakfast during and after which he was tremulous. Wife reports BP this am was low 80/55. Denies CP, palpitations, SOB, N/V/D, fevers, abd pain. Pt has had swelling of his LLE since 07/04. He was scheduled for stents 3 weeks ago of the LLE however was unable to get them and was told he would need a bypass by Dr Rojas his vascular surgeon. Pt was off xarelto for 3 days prior to scheduled stent placement. Admits to dysuria, straight caths at home 4-5 times a day.     In the ED, VS /78, HR 81, RR 16, Temp 98.6, 94% on RA. Labs significant for WBC 13.96, Hb 10.8, INR 1.68, Cr 2.3, BUN 33, procalc 4.4. CT head shows Cerebral parenchymal volume loss and chronic ischemic gliotic bihemispheric cerebral white matter changes similar to prior. Chronic infarct right corona radiata reidentified. There is no acute infarct. CT abd/pelvis negative. Doppler lower ext negative. CXR trace basilar effusions. LLE Xray negative for fracture. Pt given zosyn x1, vanco x1, NS 1L bolus x1. EKG pending.     Pt. was admitted with Neurology, Vascular Surgery and ID consult.  In regards to his left facial droop and L. LE weakness this could be possibly be CVA and recommended by neurology to continue ASA, Xarelto, and statin therapy.  Vascular surgery assessed the patient and felt he did not need surgical intervention at this time and can follow up with his vascular surgery as an outpatient. HPI:  83yo M with PMH with CHF (LVEF 30% in Aug 2016) with AICD, MI s/p CABG, A-fib on Xarelto, prostate CA s/p radiation (2004), colon CA s/p chemo (2004),  Ischemic colitis with resection with L colostomy, HTN, COPD (on 2L NC at night), Iron def anemia, CKD, PAD, B12 def presents to the ED with L lower ext weakness and swelling. As per pt's wife he had chills overnight and this morning was found to be cold, clammy and had chills again. When pt tried to get out of bed he could not stand up or walk due to LLE weakness, however after a while he was able to ambulate to the kitchen where he had breakfast during and after which he was tremulous. Wife reports BP this morning was low 80/55. Denies CP, palpitations, SOB, N/V/D, fevers, abd pain. Pt has had swelling of his LLE since 07/04. He was scheduled for stents 3 weeks ago of the LLE however was unable to get them and was told he would need a bypass by Dr Rojas his vascular surgeon. Pt was off xarelto for 3 days prior to scheduled stent placement. Admits to dysuria, straight caths at home 4-5 times a day.     In the ED, VS /78, HR 81, RR 16, Temp 98.6, 94% on RA. Labs significant for WBC 13.96, Hb 10.8, INR 1.68, Cr 2.3, BUN 33, procalc 4.4. CT head shows Cerebral parenchymal volume loss and chronic ischemic gliotic bihemispheric cerebral white matter changes similar to prior. Chronic infarct right corona radiata reidentified. There is no acute infarct. CT abd/pelvis negative. Doppler lower ext negative. CXR trace basilar effusions. LLE Xray negative for acute findings. Pt given zosyn x1, vanco x1, NS 1L bolus x1.     Pt was admitted with Neurology (Jasen), Vascular Surgery (Ez), nephrology (Rosas) and ID (Sung) consults.  In regards to his left facial droop and left leg weakness there was a question of possible CVA (though deficits similar to past stroke residual deficits) and recommended by neurology to continue ASA, Xarelto, and statin therapy.  Vascular surgery assessed the patient and felt he did not need surgical intervention at this time for PVD perspective and can follow up with his vascular surgery as an outpatient. Pt was admitted with UTI and possible left leg cellulitis. Pt given rocephin which was found to cover the E coli that grew in the urine culture. Pt had BRIGETTE on CKD which was likely prerenal azotemia and resolved with IVF. Pt improved and felt stronger. Phys Therapy recommended home with home PT. Pt discharged to home and will complete Abx course on ceftin.     On day of discharge:  ROS: Pt states that he always has some degree of dysuria, but is better than admission. Leg swelling has improved significantly. Denies fever, chills, rigors, CP, SOB.  Vital Signs Last 24 Hrs  T(C): 36.7 (23 Jul 2018 04:58), Max: 36.7 (23 Jul 2018 04:58)  T(F): 98 (23 Jul 2018 04:58), Max: 98 (23 Jul 2018 04:58)  HR: 61 (23 Jul 2018 06:36) (59 - 85)  BP: 128/72 (23 Jul 2018 06:36) (112/66 - 135/77)  BP(mean): --  RR: 18 (23 Jul 2018 06:36) (16 - 18)  SpO2: 94% (23 Jul 2018 06:36) (92% - 98%)    PHYSICAL EXAM:  GENERAL: NAD  HEENT:  anicteric, moist mucous membranes  CHEST/LUNG:  CTA b/l, no rales, wheezes, or rhonchi  HEART:  rrr, S1, S2, 2/6 systolic murmur  ABDOMEN:  BS+, soft, nontender, nondistended  EXTREMITIES: 1+ edema in left leg, trace in right leg, No cyanosis, or calf tenderness  SKIN: stasis dermatitis in LEs, skin is not warm or tender.  NERVOUS SYSTEM: answers questions appropriately and follows commands.     Time spent: 45min

## 2018-07-20 NOTE — DISCHARGE NOTE ADULT - SECONDARY DIAGNOSIS.
CKD (chronic kidney disease), stage III PVD (peripheral vascular disease) CAD (coronary artery disease) Weakness Cellulitis Congestive heart failure

## 2018-07-20 NOTE — DISCHARGE NOTE ADULT - CARE PROVIDER_API CALL
Mukund Sood), Neurology Medicine  [address] [place] [address]  Phone: [phone]  Fax: [phone] Mukund Sood), Neurology Medicine  [address] [place] [address]  Phone: [phone]  Fax: [phone]    Malcolm Nayak (MD), Vascular Surgery  [address] [facility]  [address]  [place] [address]  Phone: [phone]  Fax: [phone]    Bran Almaraz (MD; PhD), Infectious Disease; Internal Medicine  [address] [facility]  [place] [address]  Phone: [phone]  Fax: [phone]    Clovis Pillai), Nephrology  [facility] [facility]  [address]  [place] [address]  Phone: [phone]  Fax: [phone]

## 2018-07-20 NOTE — PROGRESS NOTE ADULT - SUBJECTIVE AND OBJECTIVE BOX
Neurology follow up note    LEANDRO BEAL84yMale      Interval History:    Patient feels ok no new complaints.    MEDICATIONS    acetaminophen   Tablet. 650 milliGRAM(s) Oral every 6 hours PRN  AQUAPHOR (petrolatum Ointment) 1 Application(s) Topical daily  aspirin enteric coated 81 milliGRAM(s) Oral daily  atorvastatin 80 milliGRAM(s) Oral at bedtime  carvedilol 18.75 milliGRAM(s) Oral every 12 hours  fenofibrate Tablet 145 milliGRAM(s) Oral daily  gabapentin 300 milliGRAM(s) Oral two times a day  melatonin 3 milliGRAM(s) Oral at bedtime PRN  mometasone 220 MICROgram(s) Inhaler 1 Puff(s) Inhalation daily  rivaroxaban 15 milliGRAM(s) Oral every 24 hours  traMADol 25 milliGRAM(s) Oral daily PRN      Allergies    No Known Allergies    Intolerances        Height (cm): 185.42 ( @ 02:36)  Weight (kg): 93.3 ( @ 02:36)  BMI (kg/m2): 27.1 ( @ 02:36)    Vital Signs Last 24 Hrs  T(C): 36.9 (2018 05:26), Max: 37 (2018 12:58)  T(F): 98.5 (2018 05:26), Max: 98.6 (2018 12:58)  HR: 66 (2018 07:00) (63 - 85)  BP: 122/66 (2018 07:00) (117/62 - 138/75)  BP(mean): --  RR: 18 (2018 07:00) (16 - 18)  SpO2: 95% (2018 05:26) (94% - 95%)      REVIEW OF SYSTEMS:    Constitutional: No fever, chills, fatigue, left leg weakness  Eyes: no eye pain, visual disturbances, or discharge  ENT:  No difficulty hearing, tinnitus, vertigo; No sinus or throat pain  Neck: No pain or stiffness  Respiratory: No cough, dyspnea, wheezing   Cardiovascular: No chest pain, palpitations,   Gastrointestinal: No abdominal or epigastric pain. No nausea, vomiting  No diarrhea or constipation.   Genitourinary: No dysuria, frequency, hematuria or incontinence  Neurological: No headaches, lightheadedness, vertigo, numbness or tremors  Psychiatric: No depression, anxiety, mood swings or difficulty sleeping  Musculoskeletal: No joint pain or swelling; No muscle, back or extremity pain  Skin: No itching, burning, rashes or lesions   Lymph Nodes: No enlarged glands  Endocrine: No heat or cold intolerance; No hair loss   Allergy and Immunologic: No hives or eczema    On Neurological Examination:     The patient is awake, alert.      Extraocular movements were intact.      On primary gaze there is a decrease in left nasolabial folds with a subtle left facial asymmetry upon smile.      Bilateral upper were 4/5, right lower was 4-/5, left lower was 3+ to 4-/5.      Sensory, bilateral upper and lower intact to light touch.    Follow simple commands    GENERAL Exam: Nontoxic , No Acute Distress   	  HEENT:  normocephalic, atraumatic  		  LUNGS: Clear bilaterally    	  HEART: Normal S1S2   No murmur RRR        	  GI/ ABDOMEN:  Soft  Non tender    EXTREMITIES:   No Edema  No Clubbing  No Cyanosis No Edema    MUSCULOSKELETAL:  decreased Range of Motion left extremities   	   SKIN: Normal  No Ecchymosis               LABS:  CBC Full  -  ( 2018 08:25 )  WBC Count : 9.13 K/uL  Hemoglobin : 9.2 g/dL  Hematocrit : 28.4 %  Platelet Count - Automated : 187 K/uL  Mean Cell Volume : 97.9 fl  Mean Cell Hemoglobin : 31.7 pg  Mean Cell Hemoglobin Concentration : 32.4 gm/dL  Auto Neutrophil # : 6.99 K/uL  Auto Lymphocyte # : 0.69 K/uL  Auto Monocyte # : 1.30 K/uL  Auto Eosinophil # : 0.09 K/uL  Auto Basophil # : 0.02 K/uL  Auto Neutrophil % : 76.6 %  Auto Lymphocyte % : 7.6 %  Auto Monocyte % : 14.2 %  Auto Eosinophil % : 1.0 %  Auto Basophil % : 0.2 %    Urinalysis Basic - ( 2018 15:18 )    Color: Yellow / Appearance: Clear / S.015 / pH: x  Gluc: x / Ketone: Negative  / Bili: Negative / Urobili: Negative   Blood: x / Protein: 25 mg/dL / Nitrite: Negative   Leuk Esterase: Moderate / RBC: 6-10 /HPF / WBC 26-50   Sq Epi: x / Non Sq Epi: Moderate / Bacteria: Many      07-20    140  |  104  |  26<H>  ----------------------------<  120<H>  3.9   |  27  |  1.90<H>    Ca    8.6      2018 08:25    TPro  6.8  /  Alb  3.2<L>  /  TBili  0.6  /  DBili  x   /  AST  22  /  ALT  14  /  AlkPhos  36<L>  07-19    Hemoglobin A1C:     LIVER FUNCTIONS - ( 2018 14:15 )  Alb: 3.2 g/dL / Pro: 6.8 g/dL / ALK PHOS: 36 U/L / ALT: 14 U/L / AST: 22 U/L / GGT: x           Vitamin B12   PT/INR - ( 2018 14:15 )   PT: 18.5 sec;   INR: 1.68 ratio         PTT - ( 2018 14:15 )  PTT:33.4 sec      RADIOLOGY    ANALYSIS AND PLAN:  This is an 84-year-old with episode of feeling cold and clammy and lightheaded, history of neuropathy and left facial droop, left leg weakness.    1.	Left facial droop and left leg weakness.  Suspect left facial droop possibly could be secondary to a cerebrovascular accident.  The patient did have a new cerebrovascular accident on top of his old one, would not show up on CT imaging. PPM so no MRI The patient was off his Xarelto and aspirin for 3 days a few weeks ago. Would recommend continue aspirin and Xarelto. Do to increased renal function unable to do CTA  2.	I would recommend telemetry evaluation for underlying arrhythmia.  3.	For history of neuropathy, continue the patient's gabapentin.  4.	For left leg weakness, questionable this could be secondary to a slight cerebrovascular accident versus possibly cellulitis, ischemia of the left lower extremity with difficulty ambulating.  5.	I would recommend fall precautions.  6.	spouse, Esperanza at [phone]  18  Spoke to daughter, Tanika at [phone]. spoke to spouse she understands treatment with current medication and limitations on exams     Thank you for the courtesy of consultation.    Physical therapy evaluation as tolerated  OOB to chair/ambulation with assistance only if possible.    Greater than 45 minutes spent in direct patient care reviewing  the notes, lab data/ imaging , discussion with multidisciplinary team.

## 2018-07-20 NOTE — CONSULT NOTE ADULT - SUBJECTIVE AND OBJECTIVE BOX
History of Present Illness:  84y Male with a history of PAD and multiple medical  issues was admitted yesterday with left > right LE swelling  with LLE weakness and erythema coupled with chills and low BP. He has a hx  of PAD with a prior stent in his right leg  and a prior femoral-femoral bypass. Several weeks ago he had a unsuccessful attempt at atherectomy and stenting of his left leg.  He denies ischemic pedal pain at rest or any recent digital ulcers. He ambulates with a walker. I was requested to evaluate his LE circulation.    PAST MEDICAL & SURGICAL HISTORY:  Oxygen dependent: wears 2LNC at HS  Ischemic bowel disease  Colon cancer  Automatic implantable cardioverter-defibrillator in situ  Acute MI  Hypertension  Congestive heart failure  Insomnia  Anemia, vitamin B12 deficiency  HTN (hypertension)  Ischemic colitis, enteritis, or enterocolitis  Vitamin B 12 deficiency  Prostate cancer  Afib  CKD (chronic kidney disease)  MI (myocardial infarction)  PAD (peripheral artery disease)  CAD (coronary artery disease)  Peripheral Neuropathy  COPD (chronic obstructive pulmonary disease)  Colostomy care  Cataract extraction status of eye, right  S/P colostomy  S/P cardiac pacemaker procedure: AICD  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  S/P small bowel resection  S/P colon resection  S/P cholecystectomy  S/P bypass graft of extremity: Left to right femoral-femoral artery bypass graft 10 years ago  S/P CABG x 3      Allergies    No Known Allergies    Intolerances        MEDICATIONS  (STANDING):  AQUAPHOR (petrolatum Ointment) 1 Application(s) Topical daily  aspirin enteric coated 81 milliGRAM(s) Oral daily  atorvastatin 80 milliGRAM(s) Oral at bedtime  carvedilol 18.75 milliGRAM(s) Oral every 12 hours  fenofibrate Tablet 145 milliGRAM(s) Oral daily  gabapentin 300 milliGRAM(s) Oral two times a day  mometasone 220 MICROgram(s) Inhaler 1 Puff(s) Inhalation daily  piperacillin/tazobactam IVPB. 3.375 Gram(s) IV Intermittent every 8 hours  rivaroxaban 15 milliGRAM(s) Oral every 24 hours  vancomycin  IVPB 1000 milliGRAM(s) IV Intermittent daily    MEDICATIONS  (PRN):  acetaminophen   Tablet. 650 milliGRAM(s) Oral every 6 hours PRN Mild Pain (1 - 3)  melatonin 3 milliGRAM(s) Oral at bedtime PRN Insomnia  traMADol 25 milliGRAM(s) Oral daily PRN Moderate Pain (4 - 6)      Social History:  Smoking History: denies  Alcohol Use: denies    REVIEW OF SYSTEMS:  CONSTITUTIONAL: ++ weakness, and  or chills  EYES/ENT: No visual changes;  No vertigo or throat pain   NECK: No pain or stiffness  RESPIRATORY: No cough, wheezing, hemoptysis; No shortness of breath  CARDIOVASCULAR: No chest pain or palpitations  GASTROINTESTINAL: No abdominal or epigastric pain. No nausea, vomiting, or hematemesis; No diarrhea or constipation. No melena or hematochezia.  GENITOURINARY: No dysuria, frequency or hematuria  NEUROLOGICAL: No numbness  in either leg  SKIN: No itching, burning, rashes, or lesions   Vascular:  No lower extremity claudication, pedal rest pain or digital ulcers. ++ swelling left>right legs  All other review of systems is negative unless indicated above.    PHYSICAL EXAM:  General:  On exam, the patient is alert nontoxic appearing Male in no acute distress.  Vital Signs Last 24 Hrs  T(C): 36.9 (20 Jul 2018 05:26), Max: 37 (19 Jul 2018 12:58)  T(F): 98.5 (20 Jul 2018 05:26), Max: 98.6 (19 Jul 2018 12:58)  HR: 63 (20 Jul 2018 05:26) (63 - 85)  BP: 118/66 (20 Jul 2018 05:26) (117/62 - 138/75)  BP(mean): --  RR: 18 (20 Jul 2018 05:26) (16 - 18)  SpO2: 95% (20 Jul 2018 05:26) (94% - 95%)    Neck:  4+/4+ bilateral carotid pulses; no carotid bruit, no palpable cervical masses.  Heart:  irregular, no murmurs, rubs or gallops.    Lungs:  Decreased BS at both bases   Chest:  No chest wall deformities  Symmetrical chest expansion. +++PPM  Abdomen: Soft and nontender.  No palpable masses.  No rebound, guarding or rigidity.  Extremities:  Feet are warm, pink with normal capillary refill times.  There are no digital ulcers or heel decubiti.  The calf and thigh muscles are soft and nontender.  There are no palpable cords. Lata's sign  is negative bilaterally.  There is no lower extremity  cyanosis, or rubor. There is 1+ edema of left > right ankles with mild left ankle induration  On examination of the peripheral pulses:  Left leg femoral pulse is 3/4   , popliteal pulse is 0  ,PT Pulse is 0  , DP Pulse is 0  Right leg femoral pulse is 3/4  ,popliteal pulse is  2/4  , PT Pulse is 0 , DP Pulse is 2  Dopplers: there are acceptable doppler signals over bilateral DP and PT vessels  Neurological:  There are no motor or sensory deficits in either lower extremity.                          10.8   13.96 )-----------( 233      ( 19 Jul 2018 14:15 )             33.4     07-19    136  |  100  |  33<H>  ----------------------------<  111<H>  4.0   |  28  |  2.30<H>    Ca    9.1      19 Jul 2018 14:15    TPro  6.8  /  Alb  3.2<L>  /  TBili  0.6  /  DBili  x   /  AST  22  /  ALT  14  /  AlkPhos  36<L>  07-19        PT/INR - ( 19 Jul 2018 14:15 )   PT: 18.5 sec;   INR: 1.68 ratio         PTT - ( 19 Jul 2018 14:15 )  PTT:33.4 sec    Radiology:

## 2018-07-20 NOTE — PROGRESS NOTE ADULT - SUBJECTIVE AND OBJECTIVE BOX
CHIEF COMPLAINT/INTERVAL HISTORY:  Pt. seen and evaluated for cellulitis of the LLE and acute on CKD.  Pt. is in no distress.  Tolerating IV antibiotics.      REVIEW OF SYSTEMS:  No fever, CP, SOB, or abdominal pain.      Vital Signs Last 24 Hrs  T(C): 36.9 (2018 05:26), Max: 37 (2018 12:58)  T(F): 98.5 (2018 05:26), Max: 98.6 (2018 12:58)  HR: 66 (2018 07:00) (63 - 85)  BP: 122/66 (2018 07:00) (117/62 - 138/75)  BP(mean): --  RR: 18 (2018 07:00) (16 - 18)  SpO2: 95% (2018 05:26) (94% - 95%)    PHYSICAL EXAM:  GENERAL: NAD  HEENT: EOMI, hearing normal, conjunctiva and sclera clear, +L. facial droop   Chest: CTA bilaterally, no wheezing  CV: S1S2, RRR,   GI: soft, +BS, NT/ND, +colostomy  Musculoskeletal: 2+ LLE edema  Neuro:  mild L. facial droop, 4/5 strength of LLE  Psychiatric: affect nL, mood nL  Skin: warm and dry    LABS:                        10.8   13.96 )-----------( 233      ( 2018 14:15 )             33.4     -    136  |  100  |  33<H>  ----------------------------<  111<H>  4.0   |  28  |  2.30<H>    Ca    9.1      2018 14:15    TPro  6.8  /  Alb  3.2<L>  /  TBili  0.6  /  DBili  x   /  AST  22  /  ALT  14  /  AlkPhos  36<L>  -19    PT/INR - ( 2018 14:15 )   PT: 18.5 sec;   INR: 1.68 ratio         PTT - ( 2018 14:15 )  PTT:33.4 sec  Urinalysis Basic - ( 2018 15:18 )    Color: Yellow / Appearance: Clear / S.015 / pH: x  Gluc: x / Ketone: Negative  / Bili: Negative / Urobili: Negative   Blood: x / Protein: 25 mg/dL / Nitrite: Negative   Leuk Esterase: Moderate / RBC: 6-10 /HPF / WBC 26-50   Sq Epi: x / Non Sq Epi: Moderate / Bacteria: Many        Assessment and Plan:  -Cellulitis of LLE:  continue IV Zosyn and Vancomycin.  ID consult.    -L. facial droop:  Appreciated neurology consult.  Continue ASA and Lipitor.  -Acute on CKD III:  Holding entresto and torsemide.  Nephrology consult  -Dysuria:  check urine cx.  Continue antibiotic tx.    -PVD:  continue ASA, Xarelto, and statin.  Vascular surgery consult appreciated.  -Afib:  Continue Coreg 18.75mg PO Q12h and Xarelto 15mg PO daily  -CAD:  continue ASA, Coreg, statin, and fibrate.   -Chronic systolic CHF:  Pt. able to lie flat with no SOB.  Currently off torsemide and entresto 2/2 BRIGETTE.  Continue Coreg, statin, and fibrate.    -HTN:  continue Coreg  -COPD:  stable.  Continue Asmanex inh daily  -Anemia:  stable  -VTE ppx:  On Xarelto. CHIEF COMPLAINT/INTERVAL HISTORY:  Pt. seen and evaluated for cellulitis of the LLE and acute on CKD.  Pt. is in no distress.  Tolerating IV antibiotics.      REVIEW OF SYSTEMS:  No fever, CP, SOB, or abdominal pain.      Vital Signs Last 24 Hrs  T(C): 36.9 (2018 05:26), Max: 37 (2018 12:58)  T(F): 98.5 (2018 05:26), Max: 98.6 (2018 12:58)  HR: 66 (2018 07:00) (63 - 85)  BP: 122/66 (2018 07:00) (117/62 - 138/75)  BP(mean): --  RR: 18 (2018 07:00) (16 - 18)  SpO2: 95% (2018 05:26) (94% - 95%)    PHYSICAL EXAM:  GENERAL: NAD  HEENT: EOMI, hearing normal, conjunctiva and sclera clear, +L. facial droop   Chest: CTA bilaterally, no wheezing  CV: S1S2, RRR,   GI: soft, +BS, NT/ND, +colostomy  Musculoskeletal: 2+ LLE edema  Neuro:  mild L. facial droop, 4/5 strength of LLE  Psychiatric: affect nL, mood nL  Skin: warm and dry    LABS:                        10.8   13.96 )-----------( 233      ( 2018 14:15 )             33.4     -    136  |  100  |  33<H>  ----------------------------<  111<H>  4.0   |  28  |  2.30<H>    Ca    9.1      2018 14:15    TPro  6.8  /  Alb  3.2<L>  /  TBili  0.6  /  DBili  x   /  AST  22  /  ALT  14  /  AlkPhos  36<L>  -19    PT/INR - ( 2018 14:15 )   PT: 18.5 sec;   INR: 1.68 ratio         PTT - ( 2018 14:15 )  PTT:33.4 sec  Urinalysis Basic - ( 2018 15:18 )    Color: Yellow / Appearance: Clear / S.015 / pH: x  Gluc: x / Ketone: Negative  / Bili: Negative / Urobili: Negative   Blood: x / Protein: 25 mg/dL / Nitrite: Negative   Leuk Esterase: Moderate / RBC: 6-10 /HPF / WBC 26-50   Sq Epi: x / Non Sq Epi: Moderate / Bacteria: Many        Assessment and Plan:  -Cellulitis of LLE:  continue IV Zosyn and Vancomycin.  Check blood cx.  ID consult.    -L. facial droop:  Appreciated neurology consult.  Continue ASA and Lipitor.  -Acute on CKD III:  No hydronephrosis on CT.  Holding entresto and torsemide.  Nephrology consult  -Dysuria:  check urine cx.  Continue antibiotic tx.    -PVD:  continue ASA, Xarelto, and statin.  Vascular surgery consult appreciated.  -Afib:  Continue Coreg 18.75mg PO Q12h and Xarelto 15mg PO daily  -CAD:  continue ASA, Coreg, statin, and fibrate.   -Chronic systolic CHF:  Pt. able to lie flat with no SOB.  Currently off torsemide and entresto 2/2 BRIGETTE.  Continue Coreg, statin, and fibrate.    -HTN:  continue Coreg  -COPD:  stable.  Continue Asmanex inh daily  -Anemia:  stable  -VTE ppx:  On Xarelto.

## 2018-07-20 NOTE — DISCHARGE NOTE ADULT - CARE PROVIDERS DIRECT ADDRESSES
,DirectAddress_Unknown ,DirectAddress_Unknown,[email or web address],DirectAddress_Unknown

## 2018-07-20 NOTE — CONSULT NOTE ADULT - ASSESSMENT
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

## 2018-07-20 NOTE — DISCHARGE NOTE ADULT - MEDICATION SUMMARY - MEDICATIONS TO TAKE
I will START or STAY ON the medications listed below when I get home from the hospital:    Aspirin Enteric Coated 81 mg oral delayed release tablet  -- 1 tab(s) by mouth once a day  -- Indication: For CAD (coronary artery disease)    tramadol 50 mg oral tablet  -- 0.5 tab(s) by mouth once a day, As Needed  -- Indication: For Pain as needed    Entresto 49 mg-51 mg oral tablet  -- 1 tab(s) by mouth 2 times a day  -- Indication: For CHF    Xarelto 15 mg oral tablet  -- 1 tab(s) by mouth once a day (in the evening)  -- Indication: For Atrial fibrillation    gabapentin 100 mg oral capsule  -- 1 cap(s) by mouth 2 times a day  -- Indication: For Neuropathic pain    atorvastatin 80 mg oral tablet  -- 1 tab(s) by mouth once a day  -- Indication: For CAD (coronary artery disease)    Zetia 10 mg oral tablet  -- 1 tab(s) by mouth once a day  -- Indication: For Lower cholesterol    fenofibrate 145 mg oral tablet  -- 1 tab(s) by mouth once a day  -- Indication: For Lower triglycerides    carvedilol 12.5 mg oral tablet  -- 1.5 tab(s) by mouth every 12 hours  -- Indication: For CHF    cefuroxime 500 mg oral tablet  -- 1 tab(s) by mouth every 12 hours for 8 days starting tomorrow morning (07/24/18)  -- Finish all this medication unless otherwise directed by prescriber.  Medication should be taken with plenty of water.  Take with food or milk.    -- Indication: For UTI (urinary tract infection)    urea 20% topical cream  -- Apply on skin to affected area 2 times a day, As Needed  -- Indication: For Home cream    Aquaphor topical ointment  -- Apply on skin to affected area once a day  -- Indication: For Home ointment    mometasone 220 mcg/inh inhalation aerosol powder  -- 1 puff(s) inhaled once a day (in the evening)  -- Indication: For Wheezing

## 2018-07-20 NOTE — CONSULT NOTE ADULT - ASSESSMENT
84 y.o. male with multiple comorbidities presents with LLE cellulitis. He has a hx of left > right  lower extremity PAD but no limb threatening ischemia. He is not a candidate for LE  stenting. He will follow up as a outpatient with his primary vascular surgeons  ( Dr. Nayak and Crystal). No interventions are needed at this time. I agree with IV antibiotics.

## 2018-07-20 NOTE — DISCHARGE NOTE ADULT - MEDICATION SUMMARY - MEDICATIONS TO CHANGE
I will SWITCH the dose or number of times a day I take the medications listed below when I get home from the hospital:    gabapentin 100 mg oral capsule  -- 3 cap(s) by mouth 2 times a day

## 2018-07-20 NOTE — DISCHARGE NOTE ADULT - PATIENT PORTAL LINK FT
You can access the [facility] Patient Portal, offered by [facility], by registering with the following website: [email or web address]

## 2018-07-21 DIAGNOSIS — N10 ACUTE PYELONEPHRITIS: ICD-10-CM

## 2018-07-21 DIAGNOSIS — N39.0 URINARY TRACT INFECTION, SITE NOT SPECIFIED: ICD-10-CM

## 2018-07-21 LAB
-  AMIKACIN: SIGNIFICANT CHANGE UP
-  AMOXICILLIN/CLAVULANIC ACID: SIGNIFICANT CHANGE UP
-  AMPICILLIN/SULBACTAM: SIGNIFICANT CHANGE UP
-  AMPICILLIN: SIGNIFICANT CHANGE UP
-  AZTREONAM: SIGNIFICANT CHANGE UP
-  CEFAZOLIN: SIGNIFICANT CHANGE UP
-  CEFEPIME: SIGNIFICANT CHANGE UP
-  CEFOXITIN: SIGNIFICANT CHANGE UP
-  CEFTRIAXONE: SIGNIFICANT CHANGE UP
-  CIPROFLOXACIN: SIGNIFICANT CHANGE UP
-  ERTAPENEM: SIGNIFICANT CHANGE UP
-  GENTAMICIN: SIGNIFICANT CHANGE UP
-  IMIPENEM: SIGNIFICANT CHANGE UP
-  LEVOFLOXACIN: SIGNIFICANT CHANGE UP
-  MEROPENEM: SIGNIFICANT CHANGE UP
-  NITROFURANTOIN: SIGNIFICANT CHANGE UP
-  PIPERACILLIN/TAZOBACTAM: SIGNIFICANT CHANGE UP
-  TIGECYCLINE: SIGNIFICANT CHANGE UP
-  TOBRAMYCIN: SIGNIFICANT CHANGE UP
-  TRIMETHOPRIM/SULFAMETHOXAZOLE: SIGNIFICANT CHANGE UP
ANION GAP SERPL CALC-SCNC: 8 MMOL/L — SIGNIFICANT CHANGE UP (ref 5–17)
BUN SERPL-MCNC: 29 MG/DL — HIGH (ref 7–23)
CALCIUM SERPL-MCNC: 9.4 MG/DL — SIGNIFICANT CHANGE UP (ref 8.5–10.1)
CHLORIDE SERPL-SCNC: 103 MMOL/L — SIGNIFICANT CHANGE UP (ref 96–108)
CO2 SERPL-SCNC: 28 MMOL/L — SIGNIFICANT CHANGE UP (ref 22–31)
CREAT SERPL-MCNC: 1.7 MG/DL — HIGH (ref 0.5–1.3)
CULTURE RESULTS: SIGNIFICANT CHANGE UP
GLUCOSE SERPL-MCNC: 139 MG/DL — HIGH (ref 70–99)
HCT VFR BLD CALC: 33.7 % — LOW (ref 39–50)
HGB BLD-MCNC: 10.7 G/DL — LOW (ref 13–17)
MAGNESIUM SERPL-MCNC: 2 MG/DL — SIGNIFICANT CHANGE UP (ref 1.6–2.6)
MCHC RBC-ENTMCNC: 31.7 PG — SIGNIFICANT CHANGE UP (ref 27–34)
MCHC RBC-ENTMCNC: 31.8 GM/DL — LOW (ref 32–36)
MCV RBC AUTO: 99.7 FL — SIGNIFICANT CHANGE UP (ref 80–100)
METHOD TYPE: SIGNIFICANT CHANGE UP
NRBC # BLD: 0 /100 WBCS — SIGNIFICANT CHANGE UP (ref 0–0)
ORGANISM # SPEC MICROSCOPIC CNT: SIGNIFICANT CHANGE UP
ORGANISM # SPEC MICROSCOPIC CNT: SIGNIFICANT CHANGE UP
PHOSPHATE SERPL-MCNC: 2.5 MG/DL — SIGNIFICANT CHANGE UP (ref 2.5–4.5)
PLATELET # BLD AUTO: 178 K/UL — SIGNIFICANT CHANGE UP (ref 150–400)
POTASSIUM SERPL-MCNC: 4.7 MMOL/L — SIGNIFICANT CHANGE UP (ref 3.5–5.3)
POTASSIUM SERPL-SCNC: 4.7 MMOL/L — SIGNIFICANT CHANGE UP (ref 3.5–5.3)
RBC # BLD: 3.38 M/UL — LOW (ref 4.2–5.8)
RBC # FLD: 15.7 % — HIGH (ref 10.3–14.5)
SODIUM SERPL-SCNC: 139 MMOL/L — SIGNIFICANT CHANGE UP (ref 135–145)
SPECIMEN SOURCE: SIGNIFICANT CHANGE UP
WBC # BLD: 9.2 K/UL — SIGNIFICANT CHANGE UP (ref 3.8–10.5)
WBC # FLD AUTO: 9.2 K/UL — SIGNIFICANT CHANGE UP (ref 3.8–10.5)

## 2018-07-21 PROCEDURE — 99233 SBSQ HOSP IP/OBS HIGH 50: CPT

## 2018-07-21 RX ORDER — CEFTRIAXONE 500 MG/1
1 INJECTION, POWDER, FOR SOLUTION INTRAMUSCULAR; INTRAVENOUS EVERY 24 HOURS
Qty: 0 | Refills: 0 | Status: DISCONTINUED | OUTPATIENT
Start: 2018-07-22 | End: 2018-07-23

## 2018-07-21 RX ORDER — CEFTRIAXONE 500 MG/1
1 INJECTION, POWDER, FOR SOLUTION INTRAMUSCULAR; INTRAVENOUS ONCE
Qty: 0 | Refills: 0 | Status: COMPLETED | OUTPATIENT
Start: 2018-07-21 | End: 2018-07-21

## 2018-07-21 RX ORDER — CEFTRIAXONE 500 MG/1
INJECTION, POWDER, FOR SOLUTION INTRAMUSCULAR; INTRAVENOUS
Qty: 0 | Refills: 0 | Status: DISCONTINUED | OUTPATIENT
Start: 2018-07-21 | End: 2018-07-23

## 2018-07-21 RX ADMIN — Medication 81 MILLIGRAM(S): at 12:16

## 2018-07-21 RX ADMIN — CARVEDILOL PHOSPHATE 18.75 MILLIGRAM(S): 80 CAPSULE, EXTENDED RELEASE ORAL at 08:14

## 2018-07-21 RX ADMIN — CEFTRIAXONE 100 GRAM(S): 500 INJECTION, POWDER, FOR SOLUTION INTRAMUSCULAR; INTRAVENOUS at 12:16

## 2018-07-21 RX ADMIN — Medication 1 APPLICATION(S): at 12:16

## 2018-07-21 RX ADMIN — Medication 145 MILLIGRAM(S): at 12:16

## 2018-07-21 RX ADMIN — GABAPENTIN 100 MILLIGRAM(S): 400 CAPSULE ORAL at 18:40

## 2018-07-21 RX ADMIN — ATORVASTATIN CALCIUM 80 MILLIGRAM(S): 80 TABLET, FILM COATED ORAL at 21:17

## 2018-07-21 RX ADMIN — RIVAROXABAN 15 MILLIGRAM(S): KIT at 18:41

## 2018-07-21 RX ADMIN — GABAPENTIN 100 MILLIGRAM(S): 400 CAPSULE ORAL at 08:15

## 2018-07-21 RX ADMIN — MOMETASONE FUROATE 1 PUFF(S): 220 INHALANT RESPIRATORY (INHALATION) at 08:16

## 2018-07-21 RX ADMIN — Medication 3 MILLIGRAM(S): at 22:33

## 2018-07-21 NOTE — PROGRESS NOTE ADULT - SUBJECTIVE AND OBJECTIVE BOX
NEPHROLOGY INTERVAL HPI/OVERNIGHT EVENTS:  HPI:  85yo M with PMH with CHF (LVEF 30% in Aug 2016) with AICD, MI s/p CABG, A-fib on Xarelto, prostate CA s/p radiation (2004), colon CA s/p chemo (2004),  Ischemic colitis with resection with L colostomy, HTN, COPD, Iron def anemia, CKD, PAD, B12 def, on 2LNC at home at night presents to the ED with L lower ext weakness and swelling. As per pts wife he had chills overnight and this am was found to be cold, clammy and had chills again. When pt tried to get out of bed he could not stand up or walk due to LLE weakness, however after a while he was able to ambulate to the kitchen where he had breakfast during and after which he was tremulous. Wife reports BP this am was low 80/55. Denies CP, palpitations, SOB, N/V/D, fevers, abd pain. Pt has had swelling of his LLE since 07/04. He was scheduled for stents 3 weeks ago of the LLE however was unable to get them and was told he would need a bypass by Dr Rojas his vascular surgeon. Pt was off xarelto for 3 days prior to scheduled stent placement. Admits to dysuria, straight caths at home 4-5 times a day.     In the ED, VS /78, HR 81, RR 16, Temp 98.6, 94% on RA. Labs significant for WBC 13.96, Hb 10.8, INR 1.68, Cr 2.3, BUN 33, procalc 4.4. CT head shows Cerebral parenchymal volume loss and chronic ischemic gliotic bihemispheric cerebral white matter changes similar to prior. Chronic infarct right corona radiata reidentified. There is no acute infarct. CT abd/pelvis negative. Doppler lower ext negative. CXR trace basilar effusions. LLE Xray negative for fracture. Pt given zosyn x1, vanco x1, NS 1L bolus x1. EKG pending. (19 Jul 2018 16:23)    Follow up Acute on CKD  No complaints    PAST MEDICAL & SURGICAL HISTORY:  Oxygen dependent: wears 2LNC at HS  Ischemic bowel disease  Colon cancer  Automatic implantable cardioverter-defibrillator in situ  Acute MI  Hypertension  Congestive heart failure  Insomnia  Anemia, vitamin B12 deficiency  HTN (hypertension)  Ischemic colitis, enteritis, or enterocolitis  Vitamin B 12 deficiency  Prostate cancer  Afib  CKD (chronic kidney disease)  MI (myocardial infarction)  PAD (peripheral artery disease)  CAD (coronary artery disease)  Peripheral Neuropathy  COPD (chronic obstructive pulmonary disease)  Colostomy care  Cataract extraction status of eye, right  S/P colostomy  S/P cardiac pacemaker procedure: AICD  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  S/P small bowel resection  S/P colon resection  S/P cholecystectomy  S/P bypass graft of extremity: Left to right femoral-femoral artery bypass graft 10 years ago  S/P CABG x 3      MEDICATIONS  (STANDING):  AQUAPHOR (petrolatum Ointment) 1 Application(s) Topical daily  aspirin enteric coated 81 milliGRAM(s) Oral daily  atorvastatin 80 milliGRAM(s) Oral at bedtime  carvedilol 18.75 milliGRAM(s) Oral every 12 hours  cefTRIAXone   IVPB      fenofibrate Tablet 145 milliGRAM(s) Oral daily  gabapentin 100 milliGRAM(s) Oral two times a day  mometasone 220 MICROgram(s) Inhaler 1 Puff(s) Inhalation daily  rivaroxaban 15 milliGRAM(s) Oral every 24 hours    MEDICATIONS  (PRN):  acetaminophen   Tablet. 650 milliGRAM(s) Oral every 6 hours PRN Mild Pain (1 - 3)  melatonin 3 milliGRAM(s) Oral at bedtime PRN Insomnia  traMADol 25 milliGRAM(s) Oral daily PRN Moderate Pain (4 - 6)      Allergies    No Known Allergies    Intolerances        Vital Signs Last 24 Hrs  T(C): 36.8 (21 Jul 2018 14:13), Max: 37.3 (20 Jul 2018 22:03)  T(F): 98.2 (21 Jul 2018 14:13), Max: 99.1 (20 Jul 2018 22:03)  HR: 61 (21 Jul 2018 14:13) (59 - 62)  BP: 122/70 (21 Jul 2018 14:13) (122/70 - 123/72)  BP(mean): --  RR: 17 (21 Jul 2018 14:13) (17 - 17)  SpO2: 98% (21 Jul 2018 14:13) (98% - 100%)  Daily     Daily     PHYSICAL EXAM:  GENERAL: appears chronically ill, oriented.  HEAD:  Atraumatic, Normocephalic  EYES: EOMI, PERRLA, conjunctiva and sclera clear  ENMT: No tonsillar erythema, exudates, or enlargement; Moist mucous membranes, Good dentition, No lesions  NECK: Supple, neck  veins full  NERVOUS SYSTEM:  Alert & Oriented X3, Good concentration; Motor Strength wnl upper and lower extremities  CHEST/LUNG: Clear to percussion bilaterally; No rales, rhonchi, wheezing, or rubs  HEART: Regular rate and rhythm; No murmurs, rubs, or gallops  ABDOMEN: Soft, Nontender, Nondistended; Bowel sounds present, body wall and flank edema  EXTREMITIES:  +2 - +3 leg edema with sligt erythema LEFT > right edema  LYMPH: No lymphadenopathy noted  SKIN: No rashes or lesions, pale      LABS:                        9.2    9.13  )-----------( 187      ( 20 Jul 2018 08:25 )             28.4     07-20    140  |  104  |  26<H>  ----------------------------<  120<H>  3.9   |  27  |  1.90<H>    Ca    8.6      20 Jul 2018 08:25                RADIOLOGY & ADDITIONAL TESTS:

## 2018-07-21 NOTE — PROGRESS NOTE ADULT - ASSESSMENT
BRIGETTE  Cellulitis leg  CKD3  Anemia  CHF (LVEF 30% in Aug 2016) with AICD, MI s/p CABG,  A-fib on Xarelto,   Prostate CA s/p radiation (2004),   Colon CA s/p chemo (2004),  Ischemic colitis with resection with L colostomy  HTN  COPD and others  PAD    Renal indices are back to baseline  Changed gabapentin 300 milliGRAM(s) Oral two times a day to 100 mg BID  Urine FeNa, FeCL, pr/cr and eosinophils - pending  self catheterization to continue  Oral hydration, no IVF needed    Thank you

## 2018-07-21 NOTE — PROGRESS NOTE ADULT - ASSESSMENT
85yo M with PMH with CHF (LVEF 30% in Aug 2016) with AICD, MI s/p CABG, A-fib on Xarelto, prostate CA s/p radiation (2004), urinary retention (intermittent self-catheterization), hx of colon CA s/p chemo (2004), Ischemic colitis with resection with L colostomy, HTN, COPD (on 2L NC QHS), anemia, CKD Stage 3, PAD, B12 def, presents to the ED with dysuria and leg swelling, admitted with UTI, question of cellulitis of Left LE and to r/out TIA/CVA.

## 2018-07-21 NOTE — PROGRESS NOTE ADULT - SUBJECTIVE AND OBJECTIVE BOX
infectious diseases progress note:    LEANDRO BEAL is a 84y y. o. Male patient    Patient reports: "not doing good things now"    ROS:    EYES:  Negative  blurry vision or double vision  GASTROINTESTINAL:  Negative for nausea, vomiting, diarrhea  -otherwise negative except for subjective    Allergies    No Known Allergies    Intolerances        ANTIBIOTICS/RELEVANT:  antimicrobials  cefTRIAXone   IVPB 1 Gram(s) IV Intermittent once  cefTRIAXone   IVPB        immunologic:    OTHER:  acetaminophen   Tablet. 650 milliGRAM(s) Oral every 6 hours PRN  AQUAPHOR (petrolatum Ointment) 1 Application(s) Topical daily  aspirin enteric coated 81 milliGRAM(s) Oral daily  atorvastatin 80 milliGRAM(s) Oral at bedtime  carvedilol 18.75 milliGRAM(s) Oral every 12 hours  fenofibrate Tablet 145 milliGRAM(s) Oral daily  gabapentin 100 milliGRAM(s) Oral two times a day  melatonin 3 milliGRAM(s) Oral at bedtime PRN  mometasone 220 MICROgram(s) Inhaler 1 Puff(s) Inhalation daily  rivaroxaban 15 milliGRAM(s) Oral every 24 hours  traMADol 25 milliGRAM(s) Oral daily PRN      Objective:  Last 24-Vital Signs Last 24 Hrs  T(C): 36.7 (2018 04:14), Max: 37.3 (2018 22:03)  T(F): 98 (2018 04:14), Max: 99.1 (2018 22:03)  HR: 62 (2018 04:14) (59 - 62)  BP: 123/67 (2018 04:14) (123/67 - 133/70)  BP(mean): --  RR: 17 (2018 04:14) (16 - 17)  SpO2: 100% (2018 04:14) (98% - 100%)    T(C): 36.7 (18 @ 04:14), Max: 37.3 (18 @ 22:03)  T(F): 98 (18 @ 04:14), Max: 99.1 (18 @ 22:03)  T(C): 36.7 (18 @ 04:14), Max: 37.3 (18 @ 22:03)  T(F): 98 (18 @ 04:14), Max: 99.1 (18 @ 22:03)  T(C): 36.7 (18 @ 04:14), Max: 37.3 (18 @ 22:03)  T(F): 98 (18 @ 04:14), Max: 99.1 (18 @ 22:03)    PHYSICAL EXAM:  Constitutional: Well-developed, well nourished  Eyes: PERRLA, EOMI  Ear/Nose/Throat: oropharynx normal	  Neck: no JVD, no lymphadenopathy, supple  Respiratory: no accessory muscle use, lung fields bilaterally clear  Cardiovascular: RRR, normal S1, S2 no m/r/g  Gastrointestinal: soft, NT, no HSM, BS-normal  Extremities: no clubbing, no cyanosis, edema absent  Neuro: patient less alert  Skin: no sig lesions      LABS:                        9.2    9.13  )-----------( 187      ( 2018 08:25 )             28.4       WBC 9.13   @ 08:25  WBC 13.96   @ 14:15      07    140  |  104  |  26<H>  ----------------------------<  120<H>  3.9   |  27  |  1.90<H>    Ca    8.6      2018 08:25    TPro  6.8  /  Alb  3.2<L>  /  TBili  0.6  /  DBili  x   /  AST  22  /  ALT  14  /  AlkPhos  36<L>        Creatinine, Serum: 1.90 mg/dL (18 @ 08:25)  Creatinine, Serum: 2.30 mg/dL (18 @ 14:15)      PT/INR - ( 2018 14:15 )   PT: 18.5 sec;   INR: 1.68 ratio         PTT - ( 2018 14:15 )  PTT:33.4 sec  Urinalysis Basic - ( 2018 15:18 )    Color: Yellow / Appearance: Clear / S.015 / pH: x  Gluc: x / Ketone: Negative  / Bili: Negative / Urobili: Negative   Blood: x / Protein: 25 mg/dL / Nitrite: Negative   Leuk Esterase: Moderate / RBC: 6-10 /HPF / WBC 26-50   Sq Epi: x / Non Sq Epi: Moderate / Bacteria: Many    MICROBIOLOGY:    Culture - Urine (18 @ 18:39)    Specimen Source: .Urine Clean Catch (Midstream)    Culture Results:   >100,000 CFU/ml Escherichia coli        RADIOLOGY & ADDITIONAL STUDIES:

## 2018-07-21 NOTE — PROGRESS NOTE ADULT - SUBJECTIVE AND OBJECTIVE BOX
Neurology Follow up note    LEANDRO BEAL84yMale    HPI:  85yo M with PMH with CHF (LVEF 30% in Aug 2016) with AICD, MI s/p CABG, A-fib on Xarelto, prostate CA s/p radiation (2004), colon CA s/p chemo (2004),  Ischemic colitis with resection with L colostomy, HTN, COPD, Iron def anemia, CKD, PAD, B12 def, on 2LNC at home at night presents to the ED with L lower ext weakness and swelling. As per pts wife he had chills overnight and this am was found to be cold, clammy and had chills again. When pt tried to get out of bed he could not stand up or walk due to LLE weakness, however after a while he was able to ambulate to the kitchen where he had breakfast during and after which he was tremulous. Wife reports BP this am was low 80/55. Denies CP, palpitations, SOB, N/V/D, fevers, abd pain. Pt has had swelling of his LLE since 07/04. He was scheduled for stents 3 weeks ago of the LLE however was unable to get them and was told he would need a bypass by Dr Rojas his vascular surgeon. Pt was off xarelto for 3 days prior to scheduled stent placement. Admits to dysuria, straight caths at home 4-5 times a day.     In the ED, VS /78, HR 81, RR 16, Temp 98.6, 94% on RA. Labs significant for WBC 13.96, Hb 10.8, INR 1.68, Cr 2.3, BUN 33, procalc 4.4. CT head shows Cerebral parenchymal volume loss and chronic ischemic gliotic bihemispheric cerebral white matter changes similar to prior. Chronic infarct right corona radiata reidentified. There is no acute infarct. CT abd/pelvis negative. Doppler lower ext negative. CXR trace basilar effusions. LLE Xray negative for fracture. Pt given zosyn x1, vanco x1, NS 1L bolus x1. EKG pending. (19 Jul 2018 16:23)      Interval History - improving left sided weakness,    Patient is seen, chart was reviewed and case was discussed with the treatment team.  Pt is not in any distress.   Lying on bed comfortably.   No events reported overnight.   No clinical seizure was reported.  Sitting on chair bed comfortably.    is at bedside.    Vital Signs Last 24 Hrs  T(C): 36.8 (21 Jul 2018 14:13), Max: 37.3 (20 Jul 2018 22:03)  T(F): 98.2 (21 Jul 2018 14:13), Max: 99.1 (20 Jul 2018 22:03)  HR: 61 (21 Jul 2018 14:13) (59 - 62)  BP: 122/70 (21 Jul 2018 14:13) (122/70 - 123/72)  BP(mean): --  RR: 17 (21 Jul 2018 14:13) (17 - 17)  SpO2: 98% (21 Jul 2018 14:13) (98% - 100%)        REVIEW OF SYSTEMS:    ma    On Neurological Examination:    Mental Status - Pt is alert, awake, oriented X3. . Follows commands well and able to answer questions appropriately.Mood and affect  normal    Speech -dysarthria.    Cranial Nerves - Pupils 3 mm equal and reactive to light, extraocular eye movements intact. Pt has no visual field deficit.  Pt has  left facial asymmetry. Facial sensation is intact.Tongue - is in midline.    Muscle tone - is normal     Motor Exam - left hemiparesis 3-4/5.    Sensory Exam -Pt withdraws all extremities equally on stimulation. No asymmetry seen. No complaints of tingling, numbness.      coordination:    Finger to nose: normal.    Deep tendon Reflexes - 2 plus all over    Neck Supple -  Yes.     MEDICATIONS    acetaminophen   Tablet. 650 milliGRAM(s) Oral every 6 hours PRN  AQUAPHOR (petrolatum Ointment) 1 Application(s) Topical daily  aspirin enteric coated 81 milliGRAM(s) Oral daily  atorvastatin 80 milliGRAM(s) Oral at bedtime  carvedilol 18.75 milliGRAM(s) Oral every 12 hours  cefTRIAXone   IVPB      fenofibrate Tablet 145 milliGRAM(s) Oral daily  gabapentin 100 milliGRAM(s) Oral two times a day  melatonin 3 milliGRAM(s) Oral at bedtime PRN  mometasone 220 MICROgram(s) Inhaler 1 Puff(s) Inhalation daily  rivaroxaban 15 milliGRAM(s) Oral every 24 hours  traMADol 25 milliGRAM(s) Oral daily PRN      Allergies    No Known Allergies    Intolerances        LABS:  CBC Full  -  ( 20 Jul 2018 08:25 )  WBC Count : 9.13 K/uL  Hemoglobin : 9.2 g/dL  Hematocrit : 28.4 %  Platelet Count - Automated : 187 K/uL  Mean Cell Volume : 97.9 fl  Mean Cell Hemoglobin : 31.7 pg  Mean Cell Hemoglobin Concentration : 32.4 gm/dL  Auto Neutrophil # : 6.99 K/uL  Auto Lymphocyte # : 0.69 K/uL  Auto Monocyte # : 1.30 K/uL  Auto Eosinophil # : 0.09 K/uL  Auto Basophil # : 0.02 K/uL  Auto Neutrophil % : 76.6 %  Auto Lymphocyte % : 7.6 %  Auto Monocyte % : 14.2 %  Auto Eosinophil % : 1.0 %  Auto Basophil % : 0.2 %      07-20    140  |  104  |  26<H>  ----------------------------<  120<H>  3.9   |  27  |  1.90<H>    Ca    8.6      20 Jul 2018 08:25      Hemoglobin A1C:     Vitamin B12     RADIOLOGY  < from: CT Head No Cont (07.19.18 @ 14:35) >    EXAM:  CT BRAIN                            PROCEDURE DATE:  07/19/2018          INTERPRETATION:  Left facial droop.    Noncontrast head CT. Prior 8/31/2016.    Cerebral parenchymal volume loss and chronic ischemic gliotic   bihemispheric cerebral white matter changes similar to prior. Chronic   infarct right corona radiata reidentified. There is no acute infarct,   intra-axial or extra-axial hemorrhage edema or mass effect. No   significant sinus abnormality. Cranium intact. Bilateral cataract surgery.    Impression: No acute finding. Stable brain appearance.    If there is a persistent or progressive focal neurologic deficit consider   short-term interval CT, or MRI.                IRIS MOREAU M.D., ATTENDING RADIOLOGIST  This documenthas been electronically signed. 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  repeat ct head,  Physical therapy evaluation.  OOB to chair/ambulation with assistance only.  Advanced care planning was discussed with family.  Pain is accessed and addressed.  Plan of care was discussed with family. Questions answered.  Would continue to follow.

## 2018-07-21 NOTE — PROGRESS NOTE ADULT - ASSESSMENT
83yo M with PMH with CHF (LVEF 30% in Aug 2016) with AICD, MI s/p CABG, A-fib on Xarelto, prostate CA s/p radiation (2004), colon CA s/p chemo (2004),  Ischemic colitis with resection with L colostomy, HTN, COPD, Iron def anemia, CKD, PAD, B12 def, on 2LNC at home at night presents to the ED with L lower ext weakness and swelling, leukocytosis, no fever, anb UA and urine growing >100,000 cfu E. coli

## 2018-07-21 NOTE — PROGRESS NOTE ADULT - SUBJECTIVE AND OBJECTIVE BOX
Patient is a 84y old  Male who presents with a chief complaint of dysuria and leg swelling L>R. (20 Jul 2018 09:55)      INTERVAL HPI/OVERNIGHT EVENTS: Pt states that he always has some degree of dysuria but is better than admission. Leg swelling has improved significantly. Denies fever, chills, rigors, CP, SOB.     MEDICATIONS  (STANDING):  AQUAPHOR (petrolatum Ointment) 1 Application(s) Topical daily  aspirin enteric coated 81 milliGRAM(s) Oral daily  atorvastatin 80 milliGRAM(s) Oral at bedtime  carvedilol 18.75 milliGRAM(s) Oral every 12 hours  cefTRIAXone   IVPB      fenofibrate Tablet 145 milliGRAM(s) Oral daily  gabapentin 100 milliGRAM(s) Oral two times a day  mometasone 220 MICROgram(s) Inhaler 1 Puff(s) Inhalation daily  rivaroxaban 15 milliGRAM(s) Oral every 24 hours    MEDICATIONS  (PRN):  acetaminophen   Tablet. 650 milliGRAM(s) Oral every 6 hours PRN Mild Pain (1 - 3)  melatonin 3 milliGRAM(s) Oral at bedtime PRN Insomnia  traMADol 25 milliGRAM(s) Oral daily PRN Moderate Pain (4 - 6)      Allergies    No Known Allergies    Intolerances        REVIEW OF SYSTEMS:  CONSTITUTIONAL: No fever or chills  HEENT:  No headache, no sore throat  RESPIRATORY: No cough, wheezing, or shortness of breath  CARDIOVASCULAR: No chest pain, palpitations, or leg swelling  GASTROINTESTINAL: No abd pain, nausea, vomiting, or diarrhea  GENITOURINARY: +dysuria, +chronic retention requiring self-catheterization  MUSCULOSKELETAL: no myalgias     Vital Signs Last 24 Hrs  T(C): 36.8 (21 Jul 2018 14:13), Max: 37.3 (20 Jul 2018 22:03)  T(F): 98.2 (21 Jul 2018 14:13), Max: 99.1 (20 Jul 2018 22:03)  HR: 60 (21 Jul 2018 18:41) (59 - 62)  BP: 133/70 (21 Jul 2018 18:41) (122/70 - 133/70)  BP(mean): --  RR: 17 (21 Jul 2018 14:13) (17 - 17)  SpO2: 98% (21 Jul 2018 14:13) (98% - 100%)    PHYSICAL EXAM:  GENERAL: NAD  HEENT:  anicteric, moist mucous membranes  CHEST/LUNG:  CTA b/l, no rales, wheezes, or rhonchi  HEART:  rrr, S1, S2  ABDOMEN:  BS+, soft, nontender, nondistended  EXTREMITIES: 2+edema in left leg, 1+ in right leg, No cyanosis, or calf tenderness  SKIN: stasis dermatitis in LEs, skin is not warm or tender.  NERVOUS SYSTEM: answers questions appropriately and follows commands.     LABS:                        10.7   9.20  )-----------( 178      ( 21 Jul 2018 16:38 )             33.7     CBC Full  -  ( 21 Jul 2018 16:38 )  WBC Count : 9.20 K/uL  Hemoglobin : 10.7 g/dL  Hematocrit : 33.7 %  Platelet Count - Automated : 178 K/uL  Mean Cell Volume : 99.7 fl  Mean Cell Hemoglobin : 31.7 pg  Mean Cell Hemoglobin Concentration : 31.8 gm/dL  Auto Neutrophil # : x  Auto Lymphocyte # : x  Auto Monocyte # : x  Auto Eosinophil # : x  Auto Basophil # : x  Auto Neutrophil % : x  Auto Lymphocyte % : x  Auto Monocyte % : x  Auto Eosinophil % : x  Auto Basophil % : x    21 Jul 2018 16:38    139    |  103    |  29     ----------------------------<  139    4.7     |  28     |  1.70     Ca    9.4        21 Jul 2018 16:38  Phos  2.5       21 Jul 2018 16:38  Mg     2.0       21 Jul 2018 16:38          CAPILLARY BLOOD GLUCOSE            Culture - Blood (collected 07-19-18 @ 18:42)  Source: .Blood Blood-Venous  Preliminary Report (07-20-18 @ 19:01):    No growth to date.    Culture - Blood (collected 07-19-18 @ 18:42)  Source: .Blood Blood-Venous  Preliminary Report (07-20-18 @ 19:01):    No growth to date.    Culture - Urine (collected 07-19-18 @ 18:39)  Source: .Urine Clean Catch (Midstream)  Final Report (07-21-18 @ 16:51):    >100,000 CFU/ml Escherichia coli  Organism: Escherichia coli (07-21-18 @ 16:51)  Organism: Escherichia coli (07-21-18 @ 16:51)      -  Amikacin: S <=8      -  Amoxicillin/Clavulanic Acid: S <=8/4      -  Ampicillin: S 8 These ampicillin results predict results for amoxicillin      -  Ampicillin/Sulbactam: S <=4/2      -  Aztreonam: S <=4      -  Cefazolin: S <=2 This predicts results for oral agents cefaclor, cefdinir, cefpodoxime, cefprozil, cefuroxime axetil, cephalexin and locarbef for uncomplicated UTI. Note that some isolates may be susceptible to these agents while testing resistant to cefazolin.      -  Cefepime: S <=2      -  Cefoxitin: S 8      -  Ceftriaxone: S <=1 Enterobacter, Citrobacter, and Serratia may develop resistance during prolonged therapy      -  Ciprofloxacin: R >2      -  Ertapenem: S <=0.5      -  Gentamicin: S <=1      -  Imipenem: S <=1      -  Levofloxacin: R >4      -  Meropenem: S <=1      -  Nitrofurantoin: S <=32 Should not be used to treat pyelonephritis      -  Piperacillin/Tazobactam: S <=8      -  Tigecycline: S <=1      -  Tobramycin: S <=2      -  Trimethoprim/Sulfamethoxazole: S <=0.5/9.5      Method Type: ZOILA        RADIOLOGY & ADDITIONAL TESTS:  EKG: V-paced at 60bpm, likely underlying afib atrial rhythm  Personally reviewed.     Consultant(s) Notes Reviewed:  [x] YES  [ ] NO

## 2018-07-22 LAB
ANION GAP SERPL CALC-SCNC: 8 MMOL/L — SIGNIFICANT CHANGE UP (ref 5–17)
BUN SERPL-MCNC: 34 MG/DL — HIGH (ref 7–23)
CALCIUM SERPL-MCNC: 8.8 MG/DL — SIGNIFICANT CHANGE UP (ref 8.5–10.1)
CHLORIDE SERPL-SCNC: 105 MMOL/L — SIGNIFICANT CHANGE UP (ref 96–108)
CO2 SERPL-SCNC: 28 MMOL/L — SIGNIFICANT CHANGE UP (ref 22–31)
CREAT SERPL-MCNC: 1.6 MG/DL — HIGH (ref 0.5–1.3)
GLUCOSE SERPL-MCNC: 104 MG/DL — HIGH (ref 70–99)
HCT VFR BLD CALC: 29.8 % — LOW (ref 39–50)
HGB BLD-MCNC: 9.9 G/DL — LOW (ref 13–17)
MCHC RBC-ENTMCNC: 32.5 PG — SIGNIFICANT CHANGE UP (ref 27–34)
MCHC RBC-ENTMCNC: 33.2 GM/DL — SIGNIFICANT CHANGE UP (ref 32–36)
MCV RBC AUTO: 97.7 FL — SIGNIFICANT CHANGE UP (ref 80–100)
NRBC # BLD: 0 /100 WBCS — SIGNIFICANT CHANGE UP (ref 0–0)
PLATELET # BLD AUTO: 167 K/UL — SIGNIFICANT CHANGE UP (ref 150–400)
POTASSIUM SERPL-MCNC: 4.5 MMOL/L — SIGNIFICANT CHANGE UP (ref 3.5–5.3)
POTASSIUM SERPL-SCNC: 4.5 MMOL/L — SIGNIFICANT CHANGE UP (ref 3.5–5.3)
RBC # BLD: 3.05 M/UL — LOW (ref 4.2–5.8)
RBC # FLD: 15.4 % — HIGH (ref 10.3–14.5)
SODIUM SERPL-SCNC: 141 MMOL/L — SIGNIFICANT CHANGE UP (ref 135–145)
WBC # BLD: 8.32 K/UL — SIGNIFICANT CHANGE UP (ref 3.8–10.5)
WBC # FLD AUTO: 8.32 K/UL — SIGNIFICANT CHANGE UP (ref 3.8–10.5)

## 2018-07-22 PROCEDURE — 99233 SBSQ HOSP IP/OBS HIGH 50: CPT

## 2018-07-22 RX ORDER — QUETIAPINE FUMARATE 200 MG/1
25 TABLET, FILM COATED ORAL AT BEDTIME
Qty: 0 | Refills: 0 | Status: DISCONTINUED | OUTPATIENT
Start: 2018-07-22 | End: 2018-07-23

## 2018-07-22 RX ADMIN — MOMETASONE FUROATE 1 PUFF(S): 220 INHALANT RESPIRATORY (INHALATION) at 06:32

## 2018-07-22 RX ADMIN — ATORVASTATIN CALCIUM 80 MILLIGRAM(S): 80 TABLET, FILM COATED ORAL at 22:16

## 2018-07-22 RX ADMIN — Medication 1 DROP(S): at 15:42

## 2018-07-22 RX ADMIN — Medication 1 DROP(S): at 18:40

## 2018-07-22 RX ADMIN — Medication 1 DROP(S): at 23:38

## 2018-07-22 RX ADMIN — Medication 1 APPLICATION(S): at 12:26

## 2018-07-22 RX ADMIN — CARVEDILOL PHOSPHATE 18.75 MILLIGRAM(S): 80 CAPSULE, EXTENDED RELEASE ORAL at 05:31

## 2018-07-22 RX ADMIN — RIVAROXABAN 15 MILLIGRAM(S): KIT at 18:40

## 2018-07-22 RX ADMIN — GABAPENTIN 100 MILLIGRAM(S): 400 CAPSULE ORAL at 05:03

## 2018-07-22 RX ADMIN — QUETIAPINE FUMARATE 25 MILLIGRAM(S): 200 TABLET, FILM COATED ORAL at 22:16

## 2018-07-22 RX ADMIN — Medication 81 MILLIGRAM(S): at 12:27

## 2018-07-22 RX ADMIN — Medication 145 MILLIGRAM(S): at 12:27

## 2018-07-22 RX ADMIN — CEFTRIAXONE 100 GRAM(S): 500 INJECTION, POWDER, FOR SOLUTION INTRAMUSCULAR; INTRAVENOUS at 12:28

## 2018-07-22 RX ADMIN — GABAPENTIN 100 MILLIGRAM(S): 400 CAPSULE ORAL at 18:40

## 2018-07-22 NOTE — PROGRESS NOTE ADULT - PROBLEM SELECTOR PLAN 9
-on 2L NC QHS as at home, currently stable -- will continue to monitor for signs of COPD exacerbation  -c/w mometasone
-on 2L NC QHS as at home, currently stable -- will continue to monitor for signs of COPD exacerbation  -c/w mometasone

## 2018-07-22 NOTE — PROGRESS NOTE ADULT - SUBJECTIVE AND OBJECTIVE BOX
Patient is a 84y old  Male who presents with a chief complaint of dysuria and leg swelling L>R. (20 Jul 2018 09:55)      INTERVAL HPI/OVERNIGHT EVENTS: Pt states that he always has some degree of dysuria, but is better than admission. Leg swelling has improved significantly. Denies fever, chills, rigors, CP, SOB. Pt's wife reports that he has been sundowning at night in the hospital and that it has happened on previous admissions, as well.     MEDICATIONS  (STANDING):  AQUAPHOR (petrolatum Ointment) 1 Application(s) Topical daily  artificial tears (preservative free) Ophthalmic Solution 1 Drop(s) Left EYE four times a day  aspirin enteric coated 81 milliGRAM(s) Oral daily  atorvastatin 80 milliGRAM(s) Oral at bedtime  carvedilol 18.75 milliGRAM(s) Oral every 12 hours  cefTRIAXone   IVPB 1 Gram(s) IV Intermittent every 24 hours  cefTRIAXone   IVPB      fenofibrate Tablet 145 milliGRAM(s) Oral daily  gabapentin 100 milliGRAM(s) Oral two times a day  mometasone 220 MICROgram(s) Inhaler 1 Puff(s) Inhalation daily  QUEtiapine 25 milliGRAM(s) Oral at bedtime  rivaroxaban 15 milliGRAM(s) Oral every 24 hours    MEDICATIONS  (PRN):  acetaminophen   Tablet. 650 milliGRAM(s) Oral every 6 hours PRN Mild Pain (1 - 3)  melatonin 3 milliGRAM(s) Oral at bedtime PRN Insomnia  traMADol 25 milliGRAM(s) Oral daily PRN Moderate Pain (4 - 6)      Allergies    No Known Allergies    Intolerances        REVIEW OF SYSTEMS:  ONSTITUTIONAL: No fever or chills  HEENT:  No headache, no sore throat  RESPIRATORY: No cough, wheezing, or shortness of breath  CARDIOVASCULAR: No chest pain, palpitations  GASTROINTESTINAL: No abd pain, nausea, vomiting, or diarrhea  GENITOURINARY: +dysuria, +chronic retention requiring self-catheterization  MUSCULOSKELETAL: no myalgias      Vital Signs Last 24 Hrs  T(C): 36.5 (22 Jul 2018 14:04), Max: 36.7 (22 Jul 2018 04:44)  T(F): 97.7 (22 Jul 2018 14:04), Max: 98 (22 Jul 2018 04:44)  HR: 85 (22 Jul 2018 14:04) (60 - 85)  BP: 112/66 (22 Jul 2018 14:04) (112/66 - 133/70)  BP(mean): --  RR: 16 (22 Jul 2018 14:04) (16 - 16)  SpO2: 96% (22 Jul 2018 14:04) (93% - 96%)    PHYSICAL EXAM:  GENERAL: NAD  HEENT:  anicteric, moist mucous membranes  CHEST/LUNG:  CTA b/l, no rales, wheezes, or rhonchi  HEART:  rrr, S1, S2, 2/6 systolic murmur  ABDOMEN:  BS+, soft, nontender, nondistended  EXTREMITIES: 1+ edema in left leg, trace in right leg, No cyanosis, or calf tenderness  SKIN: stasis dermatitis in LEs, skin is not warm or tender.  NERVOUS SYSTEM: answers questions appropriately and follows commands.     LABS:                        9.9    8.32  )-----------( 167      ( 22 Jul 2018 06:38 )             29.8     CBC Full  -  ( 22 Jul 2018 06:38 )  WBC Count : 8.32 K/uL  Hemoglobin : 9.9 g/dL  Hematocrit : 29.8 %  Platelet Count - Automated : 167 K/uL  Mean Cell Volume : 97.7 fl  Mean Cell Hemoglobin : 32.5 pg  Mean Cell Hemoglobin Concentration : 33.2 gm/dL  Auto Neutrophil # : x  Auto Lymphocyte # : x  Auto Monocyte # : x  Auto Eosinophil # : x  Auto Basophil # : x  Auto Neutrophil % : x  Auto Lymphocyte % : x  Auto Monocyte % : x  Auto Eosinophil % : x  Auto Basophil % : x    22 Jul 2018 06:38    141    |  105    |  34     ----------------------------<  104    4.5     |  28     |  1.60     Ca    8.8        22 Jul 2018 06:38  Phos  2.5       21 Jul 2018 16:38  Mg     2.0       21 Jul 2018 16:38          CAPILLARY BLOOD GLUCOSE            Culture - Blood (collected 07-19-18 @ 18:42)  Source: .Blood Blood-Venous  Preliminary Report (07-20-18 @ 19:01):    No growth to date.    Culture - Blood (collected 07-19-18 @ 18:42)  Source: .Blood Blood-Venous  Preliminary Report (07-20-18 @ 19:01):    No growth to date.    Culture - Urine (collected 07-19-18 @ 18:39)  Source: .Urine Clean Catch (Midstream)  Final Report (07-21-18 @ 16:51):    >100,000 CFU/ml Escherichia coli  Organism: Escherichia coli (07-21-18 @ 16:51)  Organism: Escherichia coli (07-21-18 @ 16:51)      -  Amikacin: S <=8      -  Amoxicillin/Clavulanic Acid: S <=8/4      -  Ampicillin: S 8 These ampicillin results predict results for amoxicillin      -  Ampicillin/Sulbactam: S <=4/2      -  Aztreonam: S <=4      -  Cefazolin: S <=2 This predicts results for oral agents cefaclor, cefdinir, cefpodoxime, cefprozil, cefuroxime axetil, cephalexin and locarbef for uncomplicated UTI. Note that some isolates may be susceptible to these agents while testing resistant to cefazolin.      -  Cefepime: S <=2      -  Cefoxitin: S 8      -  Ceftriaxone: S <=1 Enterobacter, Citrobacter, and Serratia may develop resistance during prolonged therapy      -  Ciprofloxacin: R >2      -  Ertapenem: S <=0.5      -  Gentamicin: S <=1      -  Imipenem: S <=1      -  Levofloxacin: R >4      -  Meropenem: S <=1      -  Nitrofurantoin: S <=32 Should not be used to treat pyelonephritis      -  Piperacillin/Tazobactam: S <=8      -  Tigecycline: S <=1      -  Tobramycin: S <=2      -  Trimethoprim/Sulfamethoxazole: S <=0.5/9.5      Method Type: ZOILA        RADIOLOGY & ADDITIONAL TESTS:  EKG: V-paced at 60bpm, likely underlying afib atrial rhythm  Personally reviewed.     Consultant(s) Notes Reviewed:  [x] YES  [ ] NO

## 2018-07-22 NOTE — PROGRESS NOTE ADULT - PROBLEM SELECTOR PLAN 5
c/w Xarelto  rate controlled  will continue coreg with hold parameters
c/w Xarelto  rate controlled  will continue coreg with hold parameters

## 2018-07-22 NOTE — PROGRESS NOTE ADULT - PROBLEM SELECTOR PLAN 8
continue coreg with hold parameters
continue coreg with hold parameters  Hold entresto in setting of BRIGETTE -- consider restarting soon.

## 2018-07-22 NOTE — PROGRESS NOTE ADULT - PROBLEM SELECTOR PLAN 7
chronic systolic CHF with last EF in our records from 08/16 of 30%   Hold entresto in setting of BRIGETTE.  monitor Is and Os  monitor for signs of fluid overload -- currently euvolemic
chronic systolic CHF with last EF in our records from 08/16 of 30%   Hold entresto in setting of BRIGETTE -- consider restarting soon.   monitor Is and Os  monitor for signs of fluid overload -- currently euvolemic

## 2018-07-22 NOTE — PROGRESS NOTE ADULT - PROBLEM SELECTOR PLAN 4
pt was unable to get stent to LLE  consulted Vasc surg and recs appreciated -- outpatient f/up with own vasc surgeon (no need for urgent surgery)
pt was unable to get stent to LLE  consulted Vasc surg and recs appreciated -- outpatient f/up with own vasc surgeon (no need for urgent surgery)

## 2018-07-22 NOTE — PROGRESS NOTE ADULT - SUBJECTIVE AND OBJECTIVE BOX
Neurology Follow up note-covering.    LEANDRO BEAL84yMale    HPI:  85yo M with PMH with CHF (LVEF 30% in Aug 2016) with AICD, MI s/p CABG, A-fib on Xarelto, prostate CA s/p radiation (2004), colon CA s/p chemo (2004),  Ischemic colitis with resection with L colostomy, HTN, COPD, Iron def anemia, CKD, PAD, B12 def, on 2LNC at home at night presents to the ED with L lower ext weakness and swelling. As per pts wife he had chills overnight and this am was found to be cold, clammy and had chills again. When pt tried to get out of bed he could not stand up or walk due to LLE weakness, however after a while he was able to ambulate to the kitchen where he had breakfast during and after which he was tremulous. Wife reports BP this am was low 80/55. Denies CP, palpitations, SOB, N/V/D, fevers, abd pain. Pt has had swelling of his LLE since 07/04. He was scheduled for stents 3 weeks ago of the LLE however was unable to get them and was told he would need a bypass by Dr Rojas his vascular surgeon. Pt was off xarelto for 3 days prior to scheduled stent placement. Admits to dysuria, straight caths at home 4-5 times a day.     In the ED, VS /78, HR 81, RR 16, Temp 98.6, 94% on RA. Labs significant for WBC 13.96, Hb 10.8, INR 1.68, Cr 2.3, BUN 33, procalc 4.4. CT head shows Cerebral parenchymal volume loss and chronic ischemic gliotic bihemispheric cerebral white matter changes similar to prior. Chronic infarct right corona radiata reidentified. There is no acute infarct. CT abd/pelvis negative. Doppler lower ext negative. CXR trace basilar effusions. LLE Xray negative for fracture. Pt given zosyn x1, vanco x1, NS 1L bolus x1. EKG pending. (19 Jul 2018 16:23)      Interval History - no new events.    Patient is seen, chart was reviewed and case was discussed with the treatment team.  Pt is not in any distress.   Lying on bed comfortably.   No events reported overnight.   No clinical seizure was reported.  Sitting on chair bed comfortably.    is at bedside.    Vital Signs Last 24 Hrs  T(C): 36.7 (22 Jul 2018 04:44), Max: 36.8 (21 Jul 2018 14:13)  T(F): 98 (22 Jul 2018 04:44), Max: 98.2 (21 Jul 2018 14:13)  HR: 60 (22 Jul 2018 04:44) (60 - 63)  BP: 131/72 (22 Jul 2018 04:44) (122/70 - 133/70)  BP(mean): --  RR: 16 (22 Jul 2018 04:44) (16 - 17)  SpO2: 95% (22 Jul 2018 05:22) (93% - 98%)          On Neurological Examination:    Mental Status - Pt is alert, awake, oriented X3. . Follows commands well and able to answer questions appropriately.Mood and affect  normal    Speech -dysarthria.    Cranial Nerves - Pupils 3 mm equal and reactive to light, extraocular eye movements intact. Pt has no visual field deficit.  Pt has  ?left facial asymmetry. Facial sensation is intact.Tongue - is in midline.    Muscle tone - is normal     Motor Exam - left hemiparesis 4/5.    Sensory Exam -Pt withdraws all extremities equally on stimulation. No asymmetry seen. No complaints of tingling, numbness.      coordination:    Finger to nose: normal.    Deep tendon Reflexes - 2 plus all over    Neck Supple -  Yes.     MEDICATIONS    acetaminophen   Tablet. 650 milliGRAM(s) Oral every 6 hours PRN  AQUAPHOR (petrolatum Ointment) 1 Application(s) Topical daily  aspirin enteric coated 81 milliGRAM(s) Oral daily  atorvastatin 80 milliGRAM(s) Oral at bedtime  carvedilol 18.75 milliGRAM(s) Oral every 12 hours  cefTRIAXone   IVPB      fenofibrate Tablet 145 milliGRAM(s) Oral daily  gabapentin 100 milliGRAM(s) Oral two times a day  melatonin 3 milliGRAM(s) Oral at bedtime PRN  mometasone 220 MICROgram(s) Inhaler 1 Puff(s) Inhalation daily  rivaroxaban 15 milliGRAM(s) Oral every 24 hours  traMADol 25 milliGRAM(s) Oral daily PRN      Allergies    No Known Allergies    Intolerances        LABS:  CBC Full  -  ( 20 Jul 2018 08:25 )  WBC Count : 9.13 K/uL  Hemoglobin : 9.2 g/dL  Hematocrit : 28.4 %  Platelet Count - Automated : 187 K/uL  Mean Cell Volume : 97.9 fl  Mean Cell Hemoglobin : 31.7 pg  Mean Cell Hemoglobin Concentration : 32.4 gm/dL  Auto Neutrophil # : 6.99 K/uL  Auto Lymphocyte # : 0.69 K/uL  Auto Monocyte # : 1.30 K/uL  Auto Eosinophil # : 0.09 K/uL  Auto Basophil # : 0.02 K/uL  Auto Neutrophil % : 76.6 %  Auto Lymphocyte % : 7.6 %  Auto Monocyte % : 14.2 %  Auto Eosinophil % : 1.0 %  Auto Basophil % : 0.2 %      07-20    140  |  104  |  26<H>  ----------------------------<  120<H>  3.9   |  27  |  1.90<H>    Ca    8.6      20 Jul 2018 08:25      Hemoglobin A1C:     Vitamin B12     RADIOLOGY  < from: CT Head No Cont (07.19.18 @ 14:35) >    EXAM:  CT BRAIN                            PROCEDURE DATE:  07/19/2018          INTERPRETATION:  Left facial droop.    Noncontrast head CT. Prior 8/31/2016.    Cerebral parenchymal volume loss and chronic ischemic gliotic   bihemispheric cerebral white matter changes similar to prior. Chronic   infarct right corona radiata reidentified. There is no acute infarct,   intra-axial or extra-axial hemorrhage edema or mass effect. No   significant sinus abnormality. Cranium intact. Bilateral cataract surgery.    Impression: No acute finding. Stable brain appearance.    If there is a persistent or progressive focal neurologic deficit consider   short-term interval CT, or MRI.          IRIS MOREAU M.D., ATTENDING RADIOLOGIST  This documenthas been electronically signed. 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    continue xarelto,  patient not in favor of repeat ct head.  Physical therapy evaluation.  OOB to chair/ambulation with assistance only.  Advanced care planning was discussed with family.  Pain is accessed and addressed.  Plan of care was discussed with family. Questions answered.  Would continue to follow.

## 2018-07-22 NOTE — PROGRESS NOTE ADULT - ASSESSMENT
83yo M with PMH with CHF (LVEF 30% in Aug 2016) with AICD, MI s/p CABG, A-fib on Xarelto, prostate CA s/p radiation (2004), urinary retention (intermittent self-catheterization), hx of colon CA s/p chemo (2004), Ischemic colitis with resection with L colostomy, HTN, COPD (on 2L NC QHS), anemia, CKD Stage 3, PAD, B12 def, presents to the ED with dysuria and leg swelling, admitted with UTI, question of cellulitis of Left LE and to r/out TIA/CVA.

## 2018-07-22 NOTE — PROGRESS NOTE ADULT - SUBJECTIVE AND OBJECTIVE BOX
NEPHROLOGY INTERVAL HPI/OVERNIGHT EVENTS:  HPI:  83yo M with PMH with CHF (LVEF 30% in Aug 2016) with AICD, MI s/p CABG, A-fib on Xarelto, prostate CA s/p radiation (2004), colon CA s/p chemo (2004),  Ischemic colitis with resection with L colostomy, HTN, COPD, Iron def anemia, CKD, PAD, B12 def, on 2LNC at home at night presents to the ED with L lower ext weakness and swelling. As per pts wife he had chills overnight and this am was found to be cold, clammy and had chills again. When pt tried to get out of bed he could not stand up or walk due to LLE weakness, however after a while he was able to ambulate to the kitchen where he had breakfast during and after which he was tremulous. Wife reports BP this am was low 80/55. Denies CP, palpitations, SOB, N/V/D, fevers, abd pain. Pt has had swelling of his LLE since 07/04. He was scheduled for stents 3 weeks ago of the LLE however was unable to get them and was told he would need a bypass by Dr Rojas his vascular surgeon. Pt was off xarelto for 3 days prior to scheduled stent placement. Admits to dysuria, straight caths at home 4-5 times a day.     In the ED, VS /78, HR 81, RR 16, Temp 98.6, 94% on RA. Labs significant for WBC 13.96, Hb 10.8, INR 1.68, Cr 2.3, BUN 33, procalc 4.4. CT head shows Cerebral parenchymal volume loss and chronic ischemic gliotic bihemispheric cerebral white matter changes similar to prior. Chronic infarct right corona radiata reidentified. There is no acute infarct. CT abd/pelvis negative. Doppler lower ext negative. CXR trace basilar effusions. LLE Xray negative for fracture. Pt given zosyn x1, vanco x1, NS 1L bolus x1. EKG pending. (19 Jul 2018 16:23)    Follow up Acute on CKD  No complaints    PAST MEDICAL & SURGICAL HISTORY:  Oxygen dependent: wears 2LNC at HS  Ischemic bowel disease  Colon cancer  Automatic implantable cardioverter-defibrillator in situ  Acute MI  Hypertension  Congestive heart failure  Insomnia  Anemia, vitamin B12 deficiency  HTN (hypertension)  Ischemic colitis, enteritis, or enterocolitis  Vitamin B 12 deficiency  Prostate cancer  Afib  CKD (chronic kidney disease)  MI (myocardial infarction)  PAD (peripheral artery disease)  CAD (coronary artery disease)  Peripheral Neuropathy  COPD (chronic obstructive pulmonary disease)  Colostomy care  Cataract extraction status of eye, right  S/P colostomy  S/P cardiac pacemaker procedure: AICD  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  S/P small bowel resection  S/P colon resection  S/P cholecystectomy  S/P bypass graft of extremity: Left to right femoral-femoral artery bypass graft 10 years ago  S/P CABG x 3      MEDICATIONS  (STANDING):  AQUAPHOR (petrolatum Ointment) 1 Application(s) Topical daily  artificial tears (preservative free) Ophthalmic Solution 1 Drop(s) Left EYE four times a day  aspirin enteric coated 81 milliGRAM(s) Oral daily  atorvastatin 80 milliGRAM(s) Oral at bedtime  carvedilol 18.75 milliGRAM(s) Oral every 12 hours  cefTRIAXone   IVPB 1 Gram(s) IV Intermittent every 24 hours  cefTRIAXone   IVPB      fenofibrate Tablet 145 milliGRAM(s) Oral daily  gabapentin 100 milliGRAM(s) Oral two times a day  mometasone 220 MICROgram(s) Inhaler 1 Puff(s) Inhalation daily  QUEtiapine 25 milliGRAM(s) Oral at bedtime  rivaroxaban 15 milliGRAM(s) Oral every 24 hours    MEDICATIONS  (PRN):  acetaminophen   Tablet. 650 milliGRAM(s) Oral every 6 hours PRN Mild Pain (1 - 3)  melatonin 3 milliGRAM(s) Oral at bedtime PRN Insomnia  traMADol 25 milliGRAM(s) Oral daily PRN Moderate Pain (4 - 6)      Allergies    No Known Allergies    Intolerances        Vital Signs Last 24 Hrs  T(C): 36.5 (22 Jul 2018 14:04), Max: 36.7 (22 Jul 2018 04:44)  T(F): 97.7 (22 Jul 2018 14:04), Max: 98 (22 Jul 2018 04:44)  HR: 85 (22 Jul 2018 14:04) (60 - 85)  BP: 112/66 (22 Jul 2018 14:04) (112/66 - 133/70)  BP(mean): --  RR: 16 (22 Jul 2018 14:04) (16 - 16)  SpO2: 96% (22 Jul 2018 14:04) (93% - 96%)  Daily     Daily     PHYSICAL EXAM:  GENERAL: appears chronically ill, oriented.  HEAD:  Atraumatic, Normocephalic  EYES: EOMI, PERRLA, conjunctiva and sclera clear  ENMT: No tonsillar erythema, exudates, or enlargement; Moist mucous membranes, Good dentition, No lesions  NECK: Supple, neck  veins full  NERVOUS SYSTEM:  Alert & Oriented X3, Good concentration; Motor Strength wnl upper and lower extremities  CHEST/LUNG: Clear to percussion bilaterally; No rales, rhonchi, wheezing, or rubs  HEART: Regular rate and rhythm; No murmurs, rubs, or gallops  ABDOMEN: Soft, Nontender, Nondistended; Bowel sounds present, body wall and flank edema  EXTREMITIES:  +edema B/L  LYMPH: No lymphadenopathy noted  SKIN: No rashes or lesions, pale    LABS:                        9.9    8.32  )-----------( 167      ( 22 Jul 2018 06:38 )             29.8     07-22    141  |  105  |  34<H>  ----------------------------<  104<H>  4.5   |  28  |  1.60<H>    Ca    8.8      22 Jul 2018 06:38  Phos  2.5     07-21  Mg     2.0     07-21          Magnesium, Serum: 2.0 mg/dL (07-21 @ 16:38)  Phosphorus Level, Serum: 2.5 mg/dL (07-21 @ 16:38)        RADIOLOGY & ADDITIONAL TESTS:

## 2018-07-22 NOTE — PROGRESS NOTE ADULT - PROBLEM SELECTOR PLAN 10
DVT ppx: On Xarelto    11. Anemia: anemia of chronic disease, and likely in part due to CKD. Will monitor H&H, currently stable
DVT ppx: On Xarelto    11. Anemia: anemia of chronic disease, and likely in part due to CKD. Will monitor H&H, currently stable    12. Dementia: pt has sundowning in the evening/nighttime that his wife states has been present on other admissions, but through the years has worsened.   -will give trial of low-dose seroquel (25mg QHS)

## 2018-07-22 NOTE — PROGRESS NOTE ADULT - PROBLEM SELECTOR PLAN 6
h/o CAGB in the past  continue ASA  continue zetia for HLD
h/o CAGB in the past  continue ASA  continue zetia for HLD

## 2018-07-22 NOTE — PROGRESS NOTE ADULT - ASSESSMENT
BRIGETTE  Cellulitis leg  CKD3  Anemia  CHF (LVEF 30% in Aug 2016) with AICD, MI s/p CABG,  A-fib on Xarelto,   Prostate CA s/p radiation (2004),   Colon CA s/p chemo (2004),  Ischemic colitis with resection with L colostomy  HTN  COPD and others  PAD    Renal indices are stable at baseline  Changed gabapentin 300 milliGRAM(s) Oral two times a day to 100 mg BID  Urine FeNa, FeCL, pr/cr and eosinophils - pending  self catheterization to continue  Oral hydration, no IVF needed    Thank you

## 2018-07-23 ENCOUNTER — APPOINTMENT (OUTPATIENT)
Dept: VASCULAR SURGERY | Facility: CLINIC | Age: 83
End: 2018-07-23

## 2018-07-23 VITALS — WEIGHT: 200.18 LBS

## 2018-07-23 LAB
ANION GAP SERPL CALC-SCNC: 9 MMOL/L — SIGNIFICANT CHANGE UP (ref 5–17)
BUN SERPL-MCNC: 37 MG/DL — HIGH (ref 7–23)
CALCIUM SERPL-MCNC: 9.4 MG/DL — SIGNIFICANT CHANGE UP (ref 8.5–10.1)
CHLORIDE SERPL-SCNC: 104 MMOL/L — SIGNIFICANT CHANGE UP (ref 96–108)
CO2 SERPL-SCNC: 27 MMOL/L — SIGNIFICANT CHANGE UP (ref 22–31)
CREAT SERPL-MCNC: 1.6 MG/DL — HIGH (ref 0.5–1.3)
EOSINOPHIL NFR URNS MANUAL: NEGATIVE — SIGNIFICANT CHANGE UP
GLUCOSE SERPL-MCNC: 133 MG/DL — HIGH (ref 70–99)
HCT VFR BLD CALC: 31.4 % — LOW (ref 39–50)
HGB BLD-MCNC: 10.2 G/DL — LOW (ref 13–17)
MCHC RBC-ENTMCNC: 31.7 PG — SIGNIFICANT CHANGE UP (ref 27–34)
MCHC RBC-ENTMCNC: 32.5 GM/DL — SIGNIFICANT CHANGE UP (ref 32–36)
MCV RBC AUTO: 97.5 FL — SIGNIFICANT CHANGE UP (ref 80–100)
NRBC # BLD: 0 /100 WBCS — SIGNIFICANT CHANGE UP (ref 0–0)
PLATELET # BLD AUTO: 157 K/UL — SIGNIFICANT CHANGE UP (ref 150–400)
POTASSIUM SERPL-MCNC: 4.1 MMOL/L — SIGNIFICANT CHANGE UP (ref 3.5–5.3)
POTASSIUM SERPL-SCNC: 4.1 MMOL/L — SIGNIFICANT CHANGE UP (ref 3.5–5.3)
RBC # BLD: 3.22 M/UL — LOW (ref 4.2–5.8)
RBC # FLD: 15.3 % — HIGH (ref 10.3–14.5)
SODIUM SERPL-SCNC: 140 MMOL/L — SIGNIFICANT CHANGE UP (ref 135–145)
WBC # BLD: 5.54 K/UL — SIGNIFICANT CHANGE UP (ref 3.8–10.5)
WBC # FLD AUTO: 5.54 K/UL — SIGNIFICANT CHANGE UP (ref 3.8–10.5)

## 2018-07-23 PROCEDURE — 99239 HOSP IP/OBS DSCHRG MGMT >30: CPT

## 2018-07-23 RX ORDER — GABAPENTIN 400 MG/1
1 CAPSULE ORAL
Qty: 60 | Refills: 0
Start: 2018-07-23 | End: 2018-08-21

## 2018-07-23 RX ORDER — GABAPENTIN 400 MG/1
3 CAPSULE ORAL
Qty: 0 | Refills: 0 | COMMUNITY

## 2018-07-23 RX ORDER — CEFUROXIME AXETIL 250 MG
1 TABLET ORAL
Qty: 20 | Refills: 0
Start: 2018-07-23 | End: 2018-08-01

## 2018-07-23 RX ORDER — CEFUROXIME AXETIL 250 MG
500 TABLET ORAL ONCE
Qty: 0 | Refills: 0 | Status: COMPLETED | OUTPATIENT
Start: 2018-07-23 | End: 2018-07-23

## 2018-07-23 RX ADMIN — CEFTRIAXONE 100 GRAM(S): 500 INJECTION, POWDER, FOR SOLUTION INTRAMUSCULAR; INTRAVENOUS at 11:19

## 2018-07-23 RX ADMIN — Medication 500 MILLIGRAM(S): at 17:28

## 2018-07-23 RX ADMIN — Medication 81 MILLIGRAM(S): at 11:21

## 2018-07-23 RX ADMIN — Medication 1 APPLICATION(S): at 11:19

## 2018-07-23 RX ADMIN — Medication 1 DROP(S): at 11:20

## 2018-07-23 RX ADMIN — Medication 145 MILLIGRAM(S): at 11:22

## 2018-07-23 RX ADMIN — Medication 1 DROP(S): at 05:26

## 2018-07-23 RX ADMIN — CARVEDILOL PHOSPHATE 18.75 MILLIGRAM(S): 80 CAPSULE, EXTENDED RELEASE ORAL at 06:33

## 2018-07-23 RX ADMIN — GABAPENTIN 100 MILLIGRAM(S): 400 CAPSULE ORAL at 05:26

## 2018-07-23 RX ADMIN — MOMETASONE FUROATE 1 PUFF(S): 220 INHALANT RESPIRATORY (INHALATION) at 06:34

## 2018-07-23 NOTE — PROGRESS NOTE ADULT - SUBJECTIVE AND OBJECTIVE BOX
NEPHROLOGY INTERVAL HPI/OVERNIGHT EVENTS:  HPI:  83yo M with PMH with CHF (LVEF 30% in Aug 2016) with AICD, MI s/p CABG, A-fib on Xarelto, prostate CA s/p radiation (2004), colon CA s/p chemo (2004),  Ischemic colitis with resection with L colostomy, HTN, COPD, Iron def anemia, CKD, PAD, B12 def, on 2LNC at home at night presents to the ED with L lower ext weakness and swelling. As per pts wife he had chills overnight and this am was found to be cold, clammy and had chills again. When pt tried to get out of bed he could not stand up or walk due to LLE weakness, however after a while he was able to ambulate to the kitchen where he had breakfast during and after which he was tremulous. Wife reports BP this am was low 80/55. Denies CP, palpitations, SOB, N/V/D, fevers, abd pain. Pt has had swelling of his LLE since 07/04. He was scheduled for stents 3 weeks ago of the LLE however was unable to get them and was told he would need a bypass by Dr Rojas his vascular surgeon. Pt was off xarelto for 3 days prior to scheduled stent placement. Admits to dysuria, straight caths at home 4-5 times a day.     In the ED, VS /78, HR 81, RR 16, Temp 98.6, 94% on RA. Labs significant for WBC 13.96, Hb 10.8, INR 1.68, Cr 2.3, BUN 33, procalc 4.4. CT head shows Cerebral parenchymal volume loss and chronic ischemic gliotic bihemispheric cerebral white matter changes similar to prior. Chronic infarct right corona radiata reidentified. There is no acute infarct. CT abd/pelvis negative. Doppler lower ext negative. CXR trace basilar effusions. LLE Xray negative for fracture. Pt given zosyn x1, vanco x1, NS 1L bolus x1. EKG pending. (19 Jul 2018 16:23)      PAST MEDICAL & SURGICAL HISTORY:  Oxygen dependent: wears 2LNC at HS  Ischemic bowel disease  Colon cancer  Automatic implantable cardioverter-defibrillator in situ  Acute MI  Hypertension  Congestive heart failure  Insomnia  Anemia, vitamin B12 deficiency  HTN (hypertension)  Ischemic colitis, enteritis, or enterocolitis  Vitamin B 12 deficiency  Prostate cancer  Afib  CKD (chronic kidney disease)  MI (myocardial infarction)  PAD (peripheral artery disease)  CAD (coronary artery disease)  Peripheral Neuropathy  COPD (chronic obstructive pulmonary disease)  Colostomy care  Cataract extraction status of eye, right  S/P colostomy  S/P cardiac pacemaker procedure: AICD  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  S/P small bowel resection  S/P colon resection  S/P cholecystectomy  S/P bypass graft of extremity: Left to right femoral-femoral artery bypass graft 10 years ago  S/P CABG x 3      MEDICATIONS  (STANDING):  AQUAPHOR (petrolatum Ointment) 1 Application(s) Topical daily  artificial tears (preservative free) Ophthalmic Solution 1 Drop(s) Left EYE four times a day  aspirin enteric coated 81 milliGRAM(s) Oral daily  atorvastatin 80 milliGRAM(s) Oral at bedtime  carvedilol 18.75 milliGRAM(s) Oral every 12 hours  cefTRIAXone   IVPB 1 Gram(s) IV Intermittent every 24 hours  cefTRIAXone   IVPB      fenofibrate Tablet 145 milliGRAM(s) Oral daily  gabapentin 100 milliGRAM(s) Oral two times a day  mometasone 220 MICROgram(s) Inhaler 1 Puff(s) Inhalation daily  QUEtiapine 25 milliGRAM(s) Oral at bedtime  rivaroxaban 15 milliGRAM(s) Oral every 24 hours    MEDICATIONS  (PRN):  acetaminophen   Tablet. 650 milliGRAM(s) Oral every 6 hours PRN Mild Pain (1 - 3)  melatonin 3 milliGRAM(s) Oral at bedtime PRN Insomnia  traMADol 25 milliGRAM(s) Oral daily PRN Moderate Pain (4 - 6)      Allergies    No Known Allergies    Intolerances        Vital Signs Last 24 Hrs  T(C): 36.7 (23 Jul 2018 04:58), Max: 36.7 (23 Jul 2018 04:58)  T(F): 98 (23 Jul 2018 04:58), Max: 98 (23 Jul 2018 04:58)  HR: 61 (23 Jul 2018 06:36) (59 - 85)  BP: 128/72 (23 Jul 2018 06:36) (112/66 - 135/77)  BP(mean): --  RR: 18 (23 Jul 2018 06:36) (16 - 18)  SpO2: 94% (23 Jul 2018 06:36) (92% - 98%)  Daily     Daily     PHYSICAL EXAM:    GENERAL: NAD, well-groomed, well-developed  HEAD:  Atraumatic, Normocephalic  EYES: EOMI, PERRLA, conjunctiva and sclera clear  ENMT: No tonsillar erythema, exudates, or enlargement; Moist mucous membranes, Good dentition, No lesions  NECK: Supple, No JVD, Normal thyroid  NERVOUS SYSTEM:  Alert & Oriented X3, Good concentration; Motor Strength 5/5 B/L upper and lower extremities; DTRs 2+ intact and symmetric  CHEST/LUNG: Clear to percussion bilaterally; No rales, rhonchi, wheezing, or rubs  HEART: Regular rate and rhythm; No murmurs, rubs, or gallops  ABDOMEN: Soft, Nontender, Nondistended; Bowel sounds present  EXTREMITIES:  2+ Peripheral Pulses, No clubbing, cyanosis, or edema  SKIN: No rashes or lesions    LABS:                        10.2   5.54  )-----------( 157      ( 23 Jul 2018 09:19 )             31.4     07-23    140  |  104  |  37<H>  ----------------------------<  133<H>  4.1   |  27  |  1.60<H>    Ca    9.4      23 Jul 2018 09:19  Phos  2.5     07-21  Mg     2.0     07-21      RADIOLOGY & ADDITIONAL TESTS:

## 2018-07-23 NOTE — PROGRESS NOTE ADULT - PROBLEM SELECTOR PLAN 1
concerns that this may be related to urinary infection
-pt states he has some degree of chronic dysuria, but is better now than on admission.   -pt has urinary retention and self-catheterizes thus is at increased risk of UTIs  -E coli on UCx now back and is sensitive to cephalosporins, so will treat with rocephin for now and consider switching to ceftin tomorrow --   -f/up ID recs  - pt had leukocytosis, bandemia and high procalcitonin on admission all pointing toward infection -- now improving  -of note -- pt may have had some element of cellulitis on admission, but currently no sign of active infection in the leg; more consistent with stasis dermatitis and pt's left LE was more edematous because pt had CABG vein harvesting from L LE, so it is chronically more edematous than the R LE.
resolved
-pt states he has some degree of chronic dysuria, but is better now than on admission.   -pt has urinary retention and self-catheterizes thus is an increased risk of UTIs  -follow up culture speciation/sensitivities; prior UCx do not suggest ESBL organism, so will treat with rocephin for now. -- discussed with ID, recs appreciated   - pt had leukocytosis, bandemia and high procalcitonin on admission all pointing toward infection -- now improving  -of note -- pt may have had some element of cellulitis on admission, but currently no sign of active infection in the leg; more consistent with stasis dermatitis and pt's left LE was more edematous because pt had CABG vein harvesting from L LE, so it is chronically more edematous than the R LE.

## 2018-07-23 NOTE — PROGRESS NOTE ADULT - PROBLEM SELECTOR PLAN 2
BRIGETTE on CKD (Baseline Cr 1.4-1.5)  approaching baseline (was likely pre-renal etiology and due to infection)  will monitor bmp  avoid nephrotoxic drugs
with abn UA, urine micro, worsening with obs off abx have started ceftriaxone based on prior micro with further recs to follow based on sensitivities.
BRIGETTE on CKD (Baseline Cr 1.4-1.5)  approaching baseline (was likely pre-renal etiology and due to infection)  will monitor BMP  avoid nephrotoxic drugs
with abn UA, urine micro, worsening with obs off abx have started ceftriaxone cx with ecoli sensitivities noted  can change to cefuroxime 500 mg po bid til July 31

## 2018-07-23 NOTE — PROGRESS NOTE ADULT - ATTENDING COMMENTS
Note written by attending, see above.  Time spent: 45min. More than 50% of the visit was spent counseling the patient on his condition - urinary tract infection with E. coli.
call with questions  ID will sign off  thank you
Note written by attending, see above.  Time spent: 40min. More than 50% of the visit was spent counseling the patient/family on his condition - urinary tract infection with E. coli.

## 2018-07-23 NOTE — PROGRESS NOTE ADULT - PROBLEM SELECTOR PLAN 3
-pt noted to have facial droop and some leg weakness. Seems pt had chronic facial droop. Any worsening/weakness was likely due to infection and not due to recurrent stroke.   -CT does not suggest acute process per radiologist -- cannot have MRI due to PPM  -Neuro consult appreciated  -Speech and swallow eval -- passed all consistencies.
per medicine.
-pt noted to have facial droop and some leg weakness. Seems pt had chronic facial droop. Any worsening/weakness was likely due to infection and not due to recurrent stroke.   -CT does not suggest acute process per radiologist -- cannot have MRI due to PPM  -Neuro consult appreciated  -Speech and swallow eval -- passed all consistencies.
management per medicine

## 2018-07-23 NOTE — PROGRESS NOTE ADULT - ASSESSMENT
83yo M with PMH with multiple medical problems  presents to the ED with L lower ext weakness and swelling, leukocytosis, no fever, anb UA and urine growing >100,000 cfu E. coli

## 2018-07-23 NOTE — PROGRESS NOTE ADULT - ASSESSMENT
BRIGETTE  Cellulitis leg  CKD3  Anemia  CHF (LVEF 30% in Aug 2016) with AICD, MI s/p CABG,  A-fib on Xarelto,   Prostate CA s/p radiation (2004),   Colon CA s/p chemo (2004),  Ischemic colitis with resection with L colostomy  HTN  COPD and others  PAD    Renal indices are stable at baseline  Changed gabapentin 300 milliGRAM(s) Oral two times a day to 100 mg BID  Urine FeNa, FeCL, pr/cr and eosinophils - pending  self catheterization to continue  Oral hydration, no IVF needed    Thank you BRIGETTE  Cellulitis leg  CKD3  Anemia  CHF (LVEF 30% in Aug 2016) with AICD, MI s/p CABG,  A-fib on Xarelto,   Prostate CA s/p radiation (2004),   Colon CA s/p chemo (2004),  Ischemic colitis with resection with L colostomy  HTN  COPD and others  PAD    Renal indices are stable at baseline  Had changed gabapentin 300 milliGRAM(s) Oral two times a day to 100 mg BID  Urine FeNa, FeCL, pr/cr and eosinophils - pending  self catheterization to continue, d/w RN urine needs to be sent today for random tests  Oral hydration, no IVF needed    Thank you

## 2018-07-23 NOTE — PROGRESS NOTE ADULT - PROBLEM SELECTOR PROBLEM 2
Pyelonephritis, acute
Acute on chronic renal insufficiency
Pyelonephritis, acute
Acute on chronic renal insufficiency

## 2018-07-23 NOTE — PROGRESS NOTE ADULT - SUBJECTIVE AND OBJECTIVE BOX
Neurology follow up note    LEANDRO BEAL84yMale      Interval History:    Patient feels ok no new complaints.    MEDICATIONS    acetaminophen   Tablet. 650 milliGRAM(s) Oral every 6 hours PRN  AQUAPHOR (petrolatum Ointment) 1 Application(s) Topical daily  artificial tears (preservative free) Ophthalmic Solution 1 Drop(s) Left EYE four times a day  aspirin enteric coated 81 milliGRAM(s) Oral daily  atorvastatin 80 milliGRAM(s) Oral at bedtime  carvedilol 18.75 milliGRAM(s) Oral every 12 hours  cefTRIAXone   IVPB 1 Gram(s) IV Intermittent every 24 hours  cefTRIAXone   IVPB      fenofibrate Tablet 145 milliGRAM(s) Oral daily  gabapentin 100 milliGRAM(s) Oral two times a day  melatonin 3 milliGRAM(s) Oral at bedtime PRN  mometasone 220 MICROgram(s) Inhaler 1 Puff(s) Inhalation daily  QUEtiapine 25 milliGRAM(s) Oral at bedtime  rivaroxaban 15 milliGRAM(s) Oral every 24 hours  traMADol 25 milliGRAM(s) Oral daily PRN      Allergies    No Known Allergies    Intolerances            Vital Signs Last 24 Hrs  T(C): 36.7 (23 Jul 2018 04:58), Max: 36.7 (23 Jul 2018 04:58)  T(F): 98 (23 Jul 2018 04:58), Max: 98 (23 Jul 2018 04:58)  HR: 61 (23 Jul 2018 06:36) (59 - 85)  BP: 128/72 (23 Jul 2018 06:36) (112/66 - 135/77)  BP(mean): --  RR: 18 (23 Jul 2018 06:36) (16 - 18)  SpO2: 94% (23 Jul 2018 06:36) (92% - 98%)    REVIEW OF SYSTEMS:    Constitutional: No fever, chills, fatigue, left leg weakness  Eyes: no eye pain, visual disturbances, or discharge  ENT:  No difficulty hearing, tinnitus, vertigo; No sinus or throat pain  Neck: No pain or stiffness  Respiratory: No cough, dyspnea, wheezing   Cardiovascular: No chest pain, palpitations,   Gastrointestinal: No abdominal or epigastric pain. No nausea, vomiting  No diarrhea or constipation.   Genitourinary: No dysuria, frequency, hematuria or incontinence  Neurological: No headaches, lightheadedness, vertigo, numbness or tremors  Psychiatric: No depression, anxiety, mood swings or difficulty sleeping  Musculoskeletal: No joint pain or swelling; No muscle, back or extremity pain  Skin: No itching, burning, rashes or lesions   Lymph Nodes: No enlarged glands  Endocrine: No heat or cold intolerance; No hair loss   Allergy and Immunologic: No hives or eczema    On Neurological Examination:     The patient is awake, alert.      Extraocular movements were intact.      On primary gaze there is a decrease in left nasolabial folds with a subtle left facial asymmetry upon smile overall appears better.      Bilateral upper were 4/5, right lower was 4-/5, left lower was 3+ to 4-/5.      Sensory, bilateral upper and lower intact to light touch.    Follow simple commands    GENERAL Exam: Nontoxic , No Acute Distress   	  HEENT:  normocephalic, atraumatic  		  LUNGS: Clear bilaterally    	  HEART: Normal S1S2   No murmur RRR        	  GI/ ABDOMEN:  Soft  Non tender    EXTREMITIES:   No Edema  No Clubbing  No Cyanosis No Edema    MUSCULOSKELETAL:  decreased Range of Motion left extremities   	   SKIN: Normal  No Ecchymosis                    LABS:  CBC Full  -  ( 22 Jul 2018 06:38 )  WBC Count : 8.32 K/uL  Hemoglobin : 9.9 g/dL  Hematocrit : 29.8 %  Platelet Count - Automated : 167 K/uL  Mean Cell Volume : 97.7 fl  Mean Cell Hemoglobin : 32.5 pg  Mean Cell Hemoglobin Concentration : 33.2 gm/dL  Auto Neutrophil # : x  Auto Lymphocyte # : x  Auto Monocyte # : x  Auto Eosinophil # : x  Auto Basophil # : x  Auto Neutrophil % : x  Auto Lymphocyte % : x  Auto Monocyte % : x  Auto Eosinophil % : x  Auto Basophil % : x      07-22    141  |  105  |  34<H>  ----------------------------<  104<H>  4.5   |  28  |  1.60<H>    Ca    8.8      22 Jul 2018 06:38  Phos  2.5     07-21  Mg     2.0     07-21      Hemoglobin A1C:       Vitamin B12         RADIOLOGY    ANALYSIS AND PLAN:  This is an 84-year-old with episode of feeling cold and clammy and lightheaded, history of neuropathy and left facial droop, left leg weakness.    1.	Left facial droop and left leg weakness.  Suspect left facial droop possibly could be secondary to a cerebrovascular accident.  The patient did have a new cerebrovascular accident on top of his old one, would not show up on CT imaging. PPM so no MRI The patient was off his Xarelto and aspirin for 3 days a few weeks ago. Would recommend continue aspirin and Xarelto. Do to increased renal function unable to do CTA  2.	I would recommend telemetry evaluation for underlying arrhythmia.  3.	For history of neuropathy, continue the patient's gabapentin.  4.	For left leg weakness, questionable this could be secondary to a slight cerebrovascular accident versus possibly cellulitis, ischemia of the left lower extremity with difficulty ambulating.  5.	CMS suspect metabolic possible from UTI and hospital   6.	I would recommend fall precautions.  7.	spouse, Esperanza at [phone]  7/23/18  Spoke to daughter, Tanika at [phone]. spoke to spouse she understands treatment with current medication and limitations on exams     Thank you for the courtesy of consultation.    Physical therapy evaluation as tolerated  OOB to chair/ambulation with assistance only if possible.    Greater than 40 minutes spent in direct patient care reviewing  the notes, lab data/ imaging , discussion with multidisciplinary team.

## 2018-07-23 NOTE — PROGRESS NOTE ADULT - PROVIDER SPECIALTY LIST ADULT
Hospitalist
Hospitalist
Infectious Disease
Nephrology
Nephrology
Neurology
Nephrology
Infectious Disease
Hospitalist

## 2018-07-23 NOTE — PROGRESS NOTE ADULT - SUBJECTIVE AND OBJECTIVE BOX
[facility], Division of Infectious Diseases  CASTRO Wolfe A. Lee  [phone]  Name: LEANDRO BEAL  Age: 84y  Gender: Male  MRN: [id number]    Interval History--  Notes reviewed  awake, alert  offers no complaints    Past Medical History--  Oxygen dependent  COPD (chronic obstructive pulmonary disease)  Ischemic bowel disease  Colon cancer  Automatic implantable cardioverter-defibrillator in situ  Acute MI  Afib  Hypertension  Congestive heart failure  Insomnia  Anemia, vitamin B12 deficiency  CAD (coronary artery disease)  HTN (hypertension)  Ischemic colitis, enteritis, or enterocolitis  Vitamin B 12 deficiency  Prostate cancer  Colon cancer  Afib  CKD (chronic kidney disease)  MI (myocardial infarction)  PAD (peripheral artery disease)  CAD (coronary artery disease)  Peripheral Neuropathy  HTN (hypertension)  COPD (chronic obstructive pulmonary disease)  Colostomy care  Cataract extraction status of eye, right  S/P colostomy  S/P colon resection  S/P cholecystectomy  S/P colostomy  S/P colon resection  S/P cardiac pacemaker procedure  S/P amputation  S/P small bowel resection  S/P colon resection  S/P cholecystectomy  S/P bypass graft of extremity  S/P CABG x 3      For details regarding the patient's social history, family history, and other miscellaneous elements, please refer the initial infectious diseases consultation and/or the admitting history and physical examination for this admission.    Allergies    No Known Allergies    Intolerances        Medications--  Antibiotics:  cefTRIAXone   IVPB 1 Gram(s) IV Intermittent every 24 hours  cefTRIAXone   IVPB        Immunologic:    Other:  acetaminophen   Tablet. PRN  AQUAPHOR (petrolatum Ointment)  artificial tears (preservative free) Ophthalmic Solution  aspirin enteric coated  atorvastatin  carvedilol  fenofibrate Tablet  gabapentin  melatonin PRN  mometasone 220 MICROgram(s) Inhaler  QUEtiapine  rivaroxaban  traMADol PRN      Review of Systems--  A 10-point review of systems was obtained.     Pertinent positives and negatives--  Constitutional: No fevers. No Chills. No Rigors.   Cardiovascular: No chest pain. No palpitations.  Respiratory: No shortness of breath. No cough.  Gastrointestinal: No nausea or vomiting. No diarrhea or constipation.   Psychiatric: no anxiety    Review of systems otherwise negative except as previously noted.    Physical Examination--  Vital Signs: T(F): 98 (07-23-18 @ 04:58), Max: 98 (07-23-18 @ 04:58)  HR: 61 (07-23-18 @ 06:36)  BP: 128/72 (07-23-18 @ 06:36)  RR: 18 (07-23-18 @ 06:36)  SpO2: 94% (07-23-18 @ 06:36)  Wt(kg): --  General: Nontoxic-appearing Male in no acute distress.  HEENT: AT/NC left eye injected  Neck: Not rigid. No sense of mass.  Nodes: None palpable.  Lungs: Clear bilaterally without rales, wheezing or rhonchi  Heart: Reg distant heart sounds  Abdomen: Bowel sounds present and normoactive. Soft. Nondistended. Nontender.   Back: No spinal tenderness. No costovertebral angle tenderness.   Extremities: No cyanosis or clubbing. ++ edema.   Skin: Warm. Dry. Good turgor. No rash. No vasculitic stigmata.  Psychiatric: Appropriate affect and mood for situation.         Laboratory Studies--  CBC                        10.2   5.54  )-----------( 157      ( 23 Jul 2018 09:19 )             31.4       Chemistries  07-22    141  |  105  |  34<H>  ----------------------------<  104<H>  4.5   |  28  |  1.60<H>    Ca    8.8      22 Jul 2018 06:38  Phos  2.5     07-21  Mg     2.0     07-21        Culture Data    Culture - Blood (collected 19 Jul 2018 18:42)  Source: .Blood Blood-Venous  Preliminary Report (20 Jul 2018 19:01):    No growth to date.    Culture - Blood (collected 19 Jul 2018 18:42)  Source: .Blood Blood-Venous  Preliminary Report (20 Jul 2018 19:01):    No growth to date.    Culture - Urine (collected 19 Jul 2018 18:39)  Source: .Urine Clean Catch (Midstream)  Final Report (21 Jul 2018 16:51):    >100,000 CFU/ml Escherichia coli  Organism: Escherichia coli (21 Jul 2018 16:51)  Organism: Escherichia coli (21 Jul 2018 16:51)        < from: CT Abdomen and Pelvis No Cont (07.19.18 @ 16:14) >    EXAM:  CT ABDOMEN AND PELVIS                            PROCEDURE DATE:  07/19/2018          INTERPRETATION:  CLINICAL INFORMATION: Elevated creatinine, fever, chills.    COMPARISON: March 28, 2017.    PROCEDURE:   CT of the Abdomen and Pelvis was performed without intravenous contrast.   Intravenous contrast: None.  Oral contrast: None.  Sagittal and coronal reformats were performed.    FINDINGS:    LOWER CHEST: Partially imaged cardiac pacemaker. Small bilateral pleural   effusions with compressive atelectasis. Unchanged right lower lobe nodule   (series 2, image 11), measuring 0.9 cm.    LIVER: Within normal limits.  BILE DUCTS: Normal caliber.   GALLBLADDER: Status post cholecystectomy.  SPLEEN: Calcified splenic granulomas.  PANCREAS: Within normal limits.  ADRENALS: Within normal limits.  KIDNEYS/URETERS: Left renal cysts. No hydronephrosis.     BLADDER: Within normal limits.  REPRODUCTIVE ORGANS: The prostate gland is not enlarged. Penile   prosthesis with reservoir in the right hemipelvis.    BOWEL/ABDOMINAL WALL: Left lower quadrant colostomy with parastomal   hernia. Right hemicolectomy with ileocolic anastomosis. Periumbilical   hernia containing unobstructed small bowel.     PERITONEUM: No ascites.  VESSELS:  Atherosclerotic change of the abdominal aorta and its branches.   Femorofemoral bypass graft.  RETROPERITONEUM: No lymphadenopathy.    BONES: Degenerative changes of the spine. Sternotomy. Intramedullary jose luis   and gamma nail in the left hip.    IMPRESSION: No acute intra-abdominal pathology.        < end of copied text >

## 2018-07-23 NOTE — PROGRESS NOTE ADULT - PROBLEM SELECTOR PROBLEM 3
R/O CVA (cerebral vascular accident)
COPD (chronic obstructive pulmonary disease)
COPD (chronic obstructive pulmonary disease)
R/O CVA (cerebral vascular accident)

## 2018-07-25 LAB
CREAT ?TM UR-MCNC: 87 MG/DL — SIGNIFICANT CHANGE UP
PROT ?TM UR-MCNC: 25 MG/DL — HIGH (ref 0–12)
PROT/CREAT UR-RTO: 0.3 RATIO — HIGH (ref 0–0.2)
SODIUM UR-SCNC: 20 MMOL/L — SIGNIFICANT CHANGE UP

## 2018-08-08 ENCOUNTER — APPOINTMENT (OUTPATIENT)
Dept: UROLOGY | Facility: CLINIC | Age: 83
End: 2018-08-08

## 2018-08-13 ENCOUNTER — APPOINTMENT (OUTPATIENT)
Dept: VASCULAR SURGERY | Facility: CLINIC | Age: 83
End: 2018-08-13
Payer: MEDICARE

## 2018-08-13 VITALS
SYSTOLIC BLOOD PRESSURE: 149 MMHG | WEIGHT: 197 LBS | BODY MASS INDEX: 25.28 KG/M2 | DIASTOLIC BLOOD PRESSURE: 71 MMHG | HEART RATE: 60 BPM | HEIGHT: 74 IN

## 2018-08-13 PROCEDURE — 99214 OFFICE O/P EST MOD 30 MIN: CPT

## 2018-08-13 PROCEDURE — 93925 LOWER EXTREMITY STUDY: CPT

## 2018-08-13 PROCEDURE — 93880 EXTRACRANIAL BILAT STUDY: CPT

## 2018-08-13 RX ORDER — SACUBITRIL AND VALSARTAN 24; 26 MG/1; MG/1
24-26 TABLET, FILM COATED ORAL
Qty: 60 | Refills: 0 | Status: COMPLETED | COMMUNITY
Start: 2017-11-13 | End: 2018-08-13

## 2018-08-17 ENCOUNTER — EMERGENCY (EMERGENCY)
Facility: HOSPITAL | Age: 83
LOS: 1 days | Discharge: HOME | End: 2018-08-17
Attending: EMERGENCY MEDICINE
Payer: COMMERCIAL

## 2018-08-17 VITALS
DIASTOLIC BLOOD PRESSURE: 77 MMHG | WEIGHT: 188.05 LBS | TEMPERATURE: 97 F | HEART RATE: 67 BPM | OXYGEN SATURATION: 99 % | HEIGHT: 72 IN | SYSTOLIC BLOOD PRESSURE: 129 MMHG | RESPIRATION RATE: 14 BRPM

## 2018-08-17 VITALS — SYSTOLIC BLOOD PRESSURE: 101 MMHG | HEART RATE: 72 BPM | DIASTOLIC BLOOD PRESSURE: 63 MMHG

## 2018-08-17 DIAGNOSIS — Z43.3 ENCOUNTER FOR ATTENTION TO COLOSTOMY: Chronic | ICD-10-CM

## 2018-08-17 LAB
ALBUMIN SERPL ELPH-MCNC: 3.2 G/DL — LOW (ref 3.3–5)
ALP SERPL-CCNC: 38 U/L — LOW (ref 40–120)
ALT FLD-CCNC: 14 U/L — SIGNIFICANT CHANGE UP (ref 12–78)
ANION GAP SERPL CALC-SCNC: 8 MMOL/L — SIGNIFICANT CHANGE UP (ref 5–17)
APPEARANCE UR: ABNORMAL
APTT BLD: 37.3 SEC — SIGNIFICANT CHANGE UP (ref 27.5–37.4)
AST SERPL-CCNC: 15 U/L — SIGNIFICANT CHANGE UP (ref 15–37)
BASOPHILS # BLD AUTO: 0.04 K/UL — SIGNIFICANT CHANGE UP (ref 0–0.2)
BASOPHILS NFR BLD AUTO: 0.4 % — SIGNIFICANT CHANGE UP (ref 0–2)
BILIRUB SERPL-MCNC: 0.4 MG/DL — SIGNIFICANT CHANGE UP (ref 0.2–1.2)
BILIRUB UR-MCNC: NEGATIVE — SIGNIFICANT CHANGE UP
BUN SERPL-MCNC: 33 MG/DL — HIGH (ref 7–23)
CALCIUM SERPL-MCNC: 9.5 MG/DL — SIGNIFICANT CHANGE UP (ref 8.5–10.1)
CHLORIDE SERPL-SCNC: 99 MMOL/L — SIGNIFICANT CHANGE UP (ref 96–108)
CK MB CFR SERPL CALC: <1 NG/ML — SIGNIFICANT CHANGE UP (ref 0–3.6)
CO2 SERPL-SCNC: 31 MMOL/L — SIGNIFICANT CHANGE UP (ref 22–31)
COLOR SPEC: YELLOW — SIGNIFICANT CHANGE UP
CREAT SERPL-MCNC: 2.2 MG/DL — HIGH (ref 0.5–1.3)
DIFF PNL FLD: ABNORMAL
EOSINOPHIL # BLD AUTO: 0.61 K/UL — HIGH (ref 0–0.5)
EOSINOPHIL NFR BLD AUTO: 6.7 % — HIGH (ref 0–6)
GLUCOSE SERPL-MCNC: 111 MG/DL — HIGH (ref 70–99)
GLUCOSE UR QL: NEGATIVE — SIGNIFICANT CHANGE UP
HCT VFR BLD CALC: 30.6 % — LOW (ref 39–50)
HGB BLD-MCNC: 10.2 G/DL — LOW (ref 13–17)
IMM GRANULOCYTES NFR BLD AUTO: 0.4 % — SIGNIFICANT CHANGE UP (ref 0–1.5)
INR BLD: 1.65 RATIO — HIGH (ref 0.88–1.16)
KETONES UR-MCNC: NEGATIVE — SIGNIFICANT CHANGE UP
LACTATE SERPL-SCNC: 2.2 MMOL/L — HIGH (ref 0.7–2)
LEUKOCYTE ESTERASE UR-ACNC: ABNORMAL
LYMPHOCYTES # BLD AUTO: 1.47 K/UL — SIGNIFICANT CHANGE UP (ref 1–3.3)
LYMPHOCYTES # BLD AUTO: 16.1 % — SIGNIFICANT CHANGE UP (ref 13–44)
MAGNESIUM SERPL-MCNC: 1.8 MG/DL — SIGNIFICANT CHANGE UP (ref 1.6–2.6)
MCHC RBC-ENTMCNC: 32.6 PG — SIGNIFICANT CHANGE UP (ref 27–34)
MCHC RBC-ENTMCNC: 33.3 GM/DL — SIGNIFICANT CHANGE UP (ref 32–36)
MCV RBC AUTO: 97.8 FL — SIGNIFICANT CHANGE UP (ref 80–100)
MONOCYTES # BLD AUTO: 1.14 K/UL — HIGH (ref 0–0.9)
MONOCYTES NFR BLD AUTO: 12.5 % — SIGNIFICANT CHANGE UP (ref 2–14)
NEUTROPHILS # BLD AUTO: 5.81 K/UL — SIGNIFICANT CHANGE UP (ref 1.8–7.4)
NEUTROPHILS NFR BLD AUTO: 63.9 % — SIGNIFICANT CHANGE UP (ref 43–77)
NITRITE UR-MCNC: NEGATIVE — SIGNIFICANT CHANGE UP
NT-PROBNP SERPL-SCNC: 2293 PG/ML — HIGH (ref 0–450)
PH UR: 6 — SIGNIFICANT CHANGE UP (ref 5–8)
PLATELET # BLD AUTO: 176 K/UL — SIGNIFICANT CHANGE UP (ref 150–400)
POTASSIUM SERPL-MCNC: 4.1 MMOL/L — SIGNIFICANT CHANGE UP (ref 3.5–5.3)
POTASSIUM SERPL-SCNC: 4.1 MMOL/L — SIGNIFICANT CHANGE UP (ref 3.5–5.3)
PROT SERPL-MCNC: 7.1 G/DL — SIGNIFICANT CHANGE UP (ref 6–8.3)
PROT UR-MCNC: NEGATIVE — SIGNIFICANT CHANGE UP
PROTHROM AB SERPL-ACNC: 18.2 SEC — HIGH (ref 9.8–12.7)
RBC # BLD: 3.13 M/UL — LOW (ref 4.2–5.8)
RBC # FLD: 14.6 % — HIGH (ref 10.3–14.5)
SODIUM SERPL-SCNC: 138 MMOL/L — SIGNIFICANT CHANGE UP (ref 135–145)
SP GR SPEC: 1 — LOW (ref 1.01–1.02)
TROPONIN I SERPL-MCNC: <.015 NG/ML — SIGNIFICANT CHANGE UP (ref 0.01–0.04)
UROBILINOGEN FLD QL: NEGATIVE — SIGNIFICANT CHANGE UP
WBC # BLD: 9.11 K/UL — SIGNIFICANT CHANGE UP (ref 3.8–10.5)
WBC # FLD AUTO: 9.11 K/UL — SIGNIFICANT CHANGE UP (ref 3.8–10.5)

## 2018-08-17 PROCEDURE — 83880 ASSAY OF NATRIURETIC PEPTIDE: CPT

## 2018-08-17 PROCEDURE — 71045 X-RAY EXAM CHEST 1 VIEW: CPT | Mod: 26

## 2018-08-17 PROCEDURE — 96374 THER/PROPH/DIAG INJ IV PUSH: CPT

## 2018-08-17 PROCEDURE — 71045 X-RAY EXAM CHEST 1 VIEW: CPT

## 2018-08-17 PROCEDURE — 99284 EMERGENCY DEPT VISIT MOD MDM: CPT | Mod: 25

## 2018-08-17 PROCEDURE — 84484 ASSAY OF TROPONIN QUANT: CPT

## 2018-08-17 PROCEDURE — 93005 ELECTROCARDIOGRAM TRACING: CPT

## 2018-08-17 PROCEDURE — 83735 ASSAY OF MAGNESIUM: CPT

## 2018-08-17 PROCEDURE — 80053 COMPREHEN METABOLIC PANEL: CPT

## 2018-08-17 PROCEDURE — 81001 URINALYSIS AUTO W/SCOPE: CPT

## 2018-08-17 PROCEDURE — 85027 COMPLETE CBC AUTOMATED: CPT

## 2018-08-17 PROCEDURE — 82553 CREATINE MB FRACTION: CPT

## 2018-08-17 PROCEDURE — 83605 ASSAY OF LACTIC ACID: CPT

## 2018-08-17 PROCEDURE — 85610 PROTHROMBIN TIME: CPT

## 2018-08-17 PROCEDURE — 85730 THROMBOPLASTIN TIME PARTIAL: CPT

## 2018-08-17 RX ORDER — CEPHALEXIN 500 MG
1 CAPSULE ORAL
Qty: 14 | Refills: 0
Start: 2018-08-17 | End: 2018-08-23

## 2018-08-17 RX ORDER — CEFAZOLIN SODIUM 1 G
1000 VIAL (EA) INJECTION ONCE
Qty: 0 | Refills: 0 | Status: COMPLETED | OUTPATIENT
Start: 2018-08-17 | End: 2018-08-17

## 2018-08-17 RX ADMIN — Medication 100 MILLIGRAM(S): at 14:25

## 2018-08-17 NOTE — PROVIDER CONTACT NOTE (CRITICAL VALUE NOTIFICATION) - SITUATION
received call  from  Britton  for lactate level of 2.2 , md Palm right away made aware , pt re-assessed, vss

## 2018-08-17 NOTE — ED PROVIDER NOTE - MEDICAL DECISION MAKING DETAILS
83yo M with PMH with CHF (LVEF 30% in Aug 2016) with AICD, MI s/p CABG, A-fib on Xarelto, prostate CA s/p radiation (2004), colon CA s/p chemo (2004),  Ischemic colitis with resection with L colostomy, HTN, COPD (on 2L NC at night), Iron def anemia, CKD, PAD, B12 def recent admission for weakness, possible CVA, discharged home with PT, p/w hypotension this AM. pt reports he was placed on double dose of diuretics completed one day prior. denies f/c/n/v/d/cp/sob/dizziness.

## 2018-08-17 NOTE — ED ADULT NURSE NOTE - NSIMPLEMENTINTERV_GEN_ALL_ED
Implemented All Fall with Harm Risk Interventions:  [place] to call system. Call bell, personal items and telephone within reach. Instruct patient to call for assistance. Room bathroom lighting operational. Non-slip footwear when patient is off stretcher. Physically safe environment: no spills, clutter or unnecessary equipment. Stretcher in lowest position, wheels locked, appropriate side rails in place. Provide visual cue, wrist band, yellow gown, etc. Monitor gait and stability. Monitor for mental status changes and reorient to person, place, and time. Review medications for side effects contributing to fall risk. Reinforce activity limits and safety measures with patient and family. Provide visual clues: red socks.

## 2018-08-17 NOTE — ED PROVIDER NOTE - NS ED ROS FT
GEN: no fever, no chills, +weakness  HENT: no eye pain, no visual changes, no ear pain, no visual or hearing changes, no sore throat, no swelling or neck pain  CV: no chest pain, no palpitations, no dizziness, no swelling  RESP: no coughing, no sob, no IWOB, no TRUJILLO  GI: no abd pain, no distension, no nausea, no vomiting, no diarrhea, no constipation  : no dysuria,  no frequency, no hematuria, no discharge, no flank pain  MUSCULOSKELETAL: no myalgia, no arthralgia, no joint swelling, no bruising   SKIN: no rash, no wounds, no itching  NEURO: no change in mentation, no visual changes, no HA, no focal weakness, no trouble speaking, no gait abnormalities, no dizziness  PSYCH: no suidical ideation, no homicidal ideation, no depression, no anxiety, no hallucinations

## 2018-08-17 NOTE — ED ADULT NURSE NOTE - OBJECTIVE STATEMENT
Pt brought in by EMS accompanied by family with c/o hypotension while on physical therapy , but on getting to the Er pt's B/P read normal , pt axox3 , not in any distress, denies chest pain or sob , ekg done , vss b/p 139/57 , speaks in full sentences , denies any weakness , n/v/d ,placed on a monitor , will monitor.

## 2018-08-17 NOTE — ED PROVIDER NOTE - PHYSICAL EXAMINATION
GEN: awake, alert, well appearing, NAD   HENT: atraumatic, normocephalic, GARCAÍ, EOMI, no midline instability, oropharynx w/o erythema or exudates, no lymphadenopathy  CV: normal rate and rhythm, S1, S2, no MRG, equal pulses throughout, no JVD  RESP: no distress, no IWOB, no retraction, clear to auscultation bilaterally   ABD: soft, nontender, nondistended, no rebound, no guarding, normoactive bowel sounds, no organomegally  MUSCULOSKELETAL: strenght 5/5 x 4, full range of motion, CMS intact   SKIN: normal color, no turgor, no wounds or rash   NEURO: Awake alert oriented x 3, no facial asymmetry, no slurred speech, no pronator drift, moving all extremities  PSYCH: no suicial ideation, no homicidal ideation, no depression, no anxiety, no hallucination

## 2018-08-17 NOTE — ED PROVIDER NOTE - CARE PLAN
Principal Discharge DX:	Hypotension, unspecified hypotension type  Secondary Diagnosis:	Acute kidney injury

## 2018-08-17 NOTE — ED PROVIDER NOTE - PROGRESS NOTE DETAILS
ekg paced, labs sig for mild BRIGETTE, pt does reports he was on double dose of diuretics every other day, last dose last night. serial bp measurements remain stable, orthostatics neg, pos ua, prior UCX shows pansensitivity, pt self caths 4-5 x daily, suspect contamination however will treat w/ keflex. cardio consulted, rec'd agrees w/ POC, rec;d pt call office on monday for follow up.

## 2018-09-05 ENCOUNTER — APPOINTMENT (OUTPATIENT)
Dept: UROLOGY | Facility: CLINIC | Age: 83
End: 2018-09-05
Payer: MEDICARE

## 2018-09-05 VITALS
BODY MASS INDEX: 24.77 KG/M2 | HEIGHT: 74 IN | DIASTOLIC BLOOD PRESSURE: 70 MMHG | SYSTOLIC BLOOD PRESSURE: 130 MMHG | WEIGHT: 193 LBS

## 2018-09-05 DIAGNOSIS — Z80.42 FAMILY HISTORY OF MALIGNANT NEOPLASM OF PROSTATE: ICD-10-CM

## 2018-09-05 DIAGNOSIS — Z86.79 PERSONAL HISTORY OF OTHER DISEASES OF THE CIRCULATORY SYSTEM: ICD-10-CM

## 2018-09-05 PROCEDURE — 99204 OFFICE O/P NEW MOD 45 MIN: CPT

## 2018-09-05 RX ORDER — CEFUROXIME AXETIL 250 MG/1
250 TABLET ORAL
Qty: 14 | Refills: 0 | Status: DISCONTINUED | COMMUNITY
Start: 2017-08-01 | End: 2018-09-05

## 2018-09-06 LAB
APPEARANCE: ABNORMAL
BACTERIA: ABNORMAL
BILIRUBIN URINE: NEGATIVE
BLOOD URINE: ABNORMAL
COLOR: YELLOW
GLUCOSE QUALITATIVE U: NEGATIVE MG/DL
HYALINE CASTS: 3 /LPF
KETONES URINE: NEGATIVE
LEUKOCYTE ESTERASE URINE: ABNORMAL
MICROSCOPIC-UA: NORMAL
NITRITE URINE: NEGATIVE
PH URINE: 6
PROTEIN URINE: 30 MG/DL
RED BLOOD CELLS URINE: 2 /HPF
SPECIFIC GRAVITY URINE: 1.01
SQUAMOUS EPITHELIAL CELLS: 0 /HPF
UROBILINOGEN URINE: NEGATIVE MG/DL
WHITE BLOOD CELLS URINE: 145 /HPF

## 2018-09-09 LAB — BACTERIA UR CULT: ABNORMAL

## 2018-09-24 ENCOUNTER — APPOINTMENT (OUTPATIENT)
Dept: ORTHOPEDIC SURGERY | Facility: CLINIC | Age: 83
End: 2018-09-24
Payer: MEDICARE

## 2018-09-24 VITALS
HEIGHT: 74 IN | BODY MASS INDEX: 24.77 KG/M2 | DIASTOLIC BLOOD PRESSURE: 69 MMHG | SYSTOLIC BLOOD PRESSURE: 108 MMHG | HEART RATE: 80 BPM | WEIGHT: 193 LBS

## 2018-09-24 DIAGNOSIS — Z86.69 PERSONAL HISTORY OF OTHER DISEASES OF THE NERVOUS SYSTEM AND SENSE ORGANS: ICD-10-CM

## 2018-09-24 DIAGNOSIS — M17.11 UNILATERAL PRIMARY OSTEOARTHRITIS, RIGHT KNEE: ICD-10-CM

## 2018-09-24 DIAGNOSIS — M17.12 UNILATERAL PRIMARY OSTEOARTHRITIS, LEFT KNEE: ICD-10-CM

## 2018-09-24 PROCEDURE — 20610 DRAIN/INJ JOINT/BURSA W/O US: CPT | Mod: 50

## 2018-09-24 PROCEDURE — 73562 X-RAY EXAM OF KNEE 3: CPT | Mod: 50

## 2018-09-24 PROCEDURE — 99214 OFFICE O/P EST MOD 30 MIN: CPT | Mod: 25

## 2018-10-01 ENCOUNTER — RX RENEWAL (OUTPATIENT)
Age: 83
End: 2018-10-01

## 2018-10-01 ENCOUNTER — CHART COPY (OUTPATIENT)
Age: 83
End: 2018-10-01

## 2018-10-01 RX ORDER — HYALURONATE SODIUM 20 MG/2 ML
20 SYRINGE (ML) INTRAARTICULAR
Qty: 2 | Refills: 0 | Status: ACTIVE | OUTPATIENT
Start: 2018-09-24

## 2018-10-09 ENCOUNTER — APPOINTMENT (OUTPATIENT)
Dept: ORTHOPEDIC SURGERY | Facility: CLINIC | Age: 83
End: 2018-10-09
Payer: MEDICARE

## 2018-10-09 PROCEDURE — 20610 DRAIN/INJ JOINT/BURSA W/O US: CPT | Mod: 50

## 2018-10-16 ENCOUNTER — APPOINTMENT (OUTPATIENT)
Dept: ORTHOPEDIC SURGERY | Facility: CLINIC | Age: 83
End: 2018-10-16
Payer: MEDICARE

## 2018-10-16 PROCEDURE — 20610 DRAIN/INJ JOINT/BURSA W/O US: CPT | Mod: LT

## 2018-10-23 ENCOUNTER — APPOINTMENT (OUTPATIENT)
Dept: ORTHOPEDIC SURGERY | Facility: CLINIC | Age: 83
End: 2018-10-23
Payer: MEDICARE

## 2018-10-23 PROCEDURE — 20610 DRAIN/INJ JOINT/BURSA W/O US: CPT | Mod: LT

## 2018-10-25 PROCEDURE — 83880 ASSAY OF NATRIURETIC PEPTIDE: CPT

## 2018-10-25 PROCEDURE — 83735 ASSAY OF MAGNESIUM: CPT

## 2018-10-25 PROCEDURE — 85027 COMPLETE CBC AUTOMATED: CPT

## 2018-10-25 PROCEDURE — 74176 CT ABD & PELVIS W/O CONTRAST: CPT

## 2018-10-25 PROCEDURE — 87040 BLOOD CULTURE FOR BACTERIA: CPT

## 2018-10-25 PROCEDURE — 93970 EXTREMITY STUDY: CPT

## 2018-10-25 PROCEDURE — 84145 PROCALCITONIN (PCT): CPT

## 2018-10-25 PROCEDURE — 71045 X-RAY EXAM CHEST 1 VIEW: CPT

## 2018-10-25 PROCEDURE — 97162 PT EVAL MOD COMPLEX 30 MIN: CPT

## 2018-10-25 PROCEDURE — 73590 X-RAY EXAM OF LOWER LEG: CPT

## 2018-10-25 PROCEDURE — 80053 COMPREHEN METABOLIC PANEL: CPT

## 2018-10-25 PROCEDURE — 85652 RBC SED RATE AUTOMATED: CPT

## 2018-10-25 PROCEDURE — 87186 SC STD MICRODIL/AGAR DIL: CPT

## 2018-10-25 PROCEDURE — 99285 EMERGENCY DEPT VISIT HI MDM: CPT | Mod: 25

## 2018-10-25 PROCEDURE — 84550 ASSAY OF BLOOD/URIC ACID: CPT

## 2018-10-25 PROCEDURE — 87205 SMEAR GRAM STAIN: CPT

## 2018-10-25 PROCEDURE — 97116 GAIT TRAINING THERAPY: CPT

## 2018-10-25 PROCEDURE — 85610 PROTHROMBIN TIME: CPT

## 2018-10-25 PROCEDURE — 97530 THERAPEUTIC ACTIVITIES: CPT

## 2018-10-25 PROCEDURE — 83690 ASSAY OF LIPASE: CPT

## 2018-10-25 PROCEDURE — 70450 CT HEAD/BRAIN W/O DYE: CPT

## 2018-10-25 PROCEDURE — 84300 ASSAY OF URINE SODIUM: CPT

## 2018-10-25 PROCEDURE — 85730 THROMBOPLASTIN TIME PARTIAL: CPT

## 2018-10-25 PROCEDURE — 84100 ASSAY OF PHOSPHORUS: CPT

## 2018-10-25 PROCEDURE — 93005 ELECTROCARDIOGRAM TRACING: CPT

## 2018-10-25 PROCEDURE — 96365 THER/PROPH/DIAG IV INF INIT: CPT

## 2018-10-25 PROCEDURE — 81001 URINALYSIS AUTO W/SCOPE: CPT

## 2018-10-25 PROCEDURE — 87086 URINE CULTURE/COLONY COUNT: CPT

## 2018-10-25 PROCEDURE — 96375 TX/PRO/DX INJ NEW DRUG ADDON: CPT

## 2018-10-25 PROCEDURE — 94640 AIRWAY INHALATION TREATMENT: CPT

## 2018-10-25 PROCEDURE — 83605 ASSAY OF LACTIC ACID: CPT

## 2018-10-25 PROCEDURE — 84156 ASSAY OF PROTEIN URINE: CPT

## 2018-10-25 PROCEDURE — 80048 BASIC METABOLIC PNL TOTAL CA: CPT

## 2018-11-12 ENCOUNTER — APPOINTMENT (OUTPATIENT)
Dept: VASCULAR SURGERY | Facility: CLINIC | Age: 83
End: 2018-11-12
Payer: MEDICARE

## 2018-11-12 DIAGNOSIS — Z85.038 PERSONAL HISTORY OF OTHER MALIGNANT NEOPLASM OF LARGE INTESTINE: ICD-10-CM

## 2018-11-12 DIAGNOSIS — Z86.39 PERSONAL HISTORY OF OTHER ENDOCRINE, NUTRITIONAL AND METABOLIC DISEASE: ICD-10-CM

## 2018-11-12 DIAGNOSIS — Z78.9 OTHER SPECIFIED HEALTH STATUS: ICD-10-CM

## 2018-11-12 DIAGNOSIS — Z86.79 PERSONAL HISTORY OF OTHER DISEASES OF THE CIRCULATORY SYSTEM: ICD-10-CM

## 2018-11-12 PROCEDURE — 93925 LOWER EXTREMITY STUDY: CPT

## 2018-11-12 PROCEDURE — 99214 OFFICE O/P EST MOD 30 MIN: CPT

## 2018-12-04 ENCOUNTER — APPOINTMENT (OUTPATIENT)
Dept: OTOLARYNGOLOGY | Facility: CLINIC | Age: 83
End: 2018-12-04
Payer: MEDICARE

## 2018-12-04 VITALS
BODY MASS INDEX: 40.43 KG/M2 | HEIGHT: 74 IN | HEART RATE: 64 BPM | WEIGHT: 315 LBS | DIASTOLIC BLOOD PRESSURE: 52 MMHG | SYSTOLIC BLOOD PRESSURE: 102 MMHG

## 2018-12-04 DIAGNOSIS — H90.5 UNSPECIFIED SENSORINEURAL HEARING LOSS: ICD-10-CM

## 2018-12-04 DIAGNOSIS — H69.81 OTHER SPECIFIED DISORDERS OF EUSTACHIAN TUBE, RIGHT EAR: ICD-10-CM

## 2018-12-04 DIAGNOSIS — H61.23 IMPACTED CERUMEN, BILATERAL: ICD-10-CM

## 2018-12-04 PROCEDURE — 92567 TYMPANOMETRY: CPT

## 2018-12-04 PROCEDURE — 99203 OFFICE O/P NEW LOW 30 MIN: CPT | Mod: 25

## 2018-12-04 PROCEDURE — 92557 COMPREHENSIVE HEARING TEST: CPT

## 2018-12-10 ENCOUNTER — APPOINTMENT (OUTPATIENT)
Dept: ORTHOPEDIC SURGERY | Facility: CLINIC | Age: 83
End: 2018-12-10
Payer: MEDICARE

## 2018-12-10 VITALS — DIASTOLIC BLOOD PRESSURE: 70 MMHG | HEART RATE: 80 BPM | SYSTOLIC BLOOD PRESSURE: 119 MMHG

## 2018-12-10 PROCEDURE — 99213 OFFICE O/P EST LOW 20 MIN: CPT | Mod: 25

## 2018-12-10 PROCEDURE — 20610 DRAIN/INJ JOINT/BURSA W/O US: CPT | Mod: RT

## 2019-01-28 ENCOUNTER — APPOINTMENT (OUTPATIENT)
Dept: ORTHOPEDIC SURGERY | Facility: CLINIC | Age: 84
End: 2019-01-28
Payer: MEDICARE

## 2019-01-28 DIAGNOSIS — M17.0 BILATERAL PRIMARY OSTEOARTHRITIS OF KNEE: ICD-10-CM

## 2019-01-28 PROCEDURE — 99213 OFFICE O/P EST LOW 20 MIN: CPT | Mod: 25

## 2019-01-28 PROCEDURE — 20610 DRAIN/INJ JOINT/BURSA W/O US: CPT | Mod: RT

## 2019-01-28 NOTE — PHYSICAL EXAM
[de-identified] : Constitutional: Well-nourished, well-developed, No acute distress\par Respiratory:  Good respiratory effort, no SOB\par Lymphatic: No regional lymphadenopathy, no lymphedema\par Psychiatric: Pleasant and normal affect, alert and oriented x3\par Skin: Clean dry and intact B/L UE/LE\par Musculoskeletal: normal except where as noted in regional exam\par \par Right Knee:\par APPEARANCE: + enlargement of the distal femur and proximal tibia, no deformity, no swelling\par POSITIVE TENDERNESS:  Distal femur, proximal tibia, medial jt line/retinaculum, and lateral jt line/retinaculum\par NONTENDER: patellar & quadriceps tendons, MCL/LCL, ITB at the lateral femoral condyle & Gerdy's tubercle, pes bursa. \par ROM: full extension, limited flexion to 120° due to stiffness and pain. \par RESISTIVE TESTING: painless resisted knee flex/ext, although + crepitus felt in anterior knee. \par SPECIAL TESTS: stable v/v stress. painless grind. neg ant/post drawer. + Giovanni's for pain in medial and lateral joint line. \par NEURO: Normal sensation of LE, DTRs 2+/4 patella and achilles\par PULSES: 2+ DP/PT pulses\par

## 2019-01-28 NOTE — DISCUSSION/SUMMARY
[de-identified] : Patient was seen today for continued management of right knee pain secondary to osteoarthritis. He was treated with a repeat cortisone injection today (see procedure note).  Informed the patient that the numbing medicine in today's injection will last for about 4-6 hours. The steroid that was injected will start to work in 1 to 2 days, peak at 1-2 weeks, and may last up to 1-2 months. We did not hear back from his insurance regarding authorization for Zilretta injection, if approved, would recommend as next and treatment.  Patient will be started on a course of physical therapy to restore normal range of motion and strength as tolerated.  Follow up as needed.  Patient appreciates and agrees with current plan.\par \par This note was generated using dragon medical dictation software.  A reasonable effort has been made for proofreading its contents, but typos may still remain.  If there are any questions or points of clarification needed please notify my office.

## 2019-01-28 NOTE — PROCEDURE
[de-identified] : Injection: Right knee joint.\par Indication: Osteoarthritis.\par \par A discussion was had with the patient regarding this procedure and all questions were answered. All risks, benefits and alternatives were discussed. These included but were not limited to bleeding, infection, allergic reaction and reaccumulation of fluid. A timeout was done to ensure correct side and pt agreed to the procedure.  Alcohol was used to clean the skin, and betadine was used to sterilize and prep the area in the lateral joint line aspect of the knee. Ethyl chloride spray was then used as a topical anesthetic. A 22-gauge needle was used to inject 4cc of 1% lidocaine without epinephrine and 1cc of 6mg/mL betamethasone into the knee. A sterile bandage was then applied. The patient tolerated the procedure well.\par

## 2019-01-28 NOTE — HISTORY OF PRESENT ILLNESS
[de-identified] : Patient is here for right knee pain follow up. He states that his pain has continued since his last visit and has progressively gotten worse. He uses a topical cream on the area that provides temporary relief and he takes Tylenol for other pain that he has. He has not gone to PT since his last visit He denies any new injury or symptoms at this time.

## 2019-02-11 ENCOUNTER — APPOINTMENT (OUTPATIENT)
Dept: VASCULAR SURGERY | Facility: CLINIC | Age: 84
End: 2019-02-11

## 2019-02-19 ENCOUNTER — APPOINTMENT (OUTPATIENT)
Dept: ORTHOPEDIC SURGERY | Facility: CLINIC | Age: 84
End: 2019-02-19
Payer: MEDICARE

## 2019-02-19 DIAGNOSIS — M17.11 UNILATERAL PRIMARY OSTEOARTHRITIS, RIGHT KNEE: ICD-10-CM

## 2019-02-19 PROCEDURE — 20610 DRAIN/INJ JOINT/BURSA W/O US: CPT | Mod: RT

## 2019-02-19 PROCEDURE — 99213 OFFICE O/P EST LOW 20 MIN: CPT | Mod: 25

## 2019-02-19 NOTE — PHYSICAL EXAM
[de-identified] : Constitutional: Well-nourished, well-developed, No acute distress\par Respiratory:  Good respiratory effort, no SOB\par Lymphatic: No regional lymphadenopathy, no lymphedema\par Psychiatric: Pleasant and normal affect, alert and oriented x3\par Skin: Clean dry and intact B/L UE/LE\par Musculoskeletal: normal except where as noted in regional exam\par \par Right Knee:\par APPEARANCE: + enlargement of the distal femur and proximal tibia, no deformity, no swelling\par POSITIVE TENDERNESS:  Distal femur, proximal tibia, medial jt line/retinaculum, and lateral jt line/retinaculum\par NONTENDER: patellar & quadriceps tendons, MCL/LCL, ITB at the lateral femoral condyle & Gerdy's tubercle, pes bursa. \par ROM: full extension, limited flexion to 120° due to stiffness and pain. \par RESISTIVE TESTING: painless resisted knee flex/ext, although + crepitus felt in anterior knee. \par SPECIAL TESTS: stable v/v stress. painless grind. neg ant/post drawer. + Giovanni's for pain in medial and lateral joint line. \par NEURO: Normal sensation of LE, DTRs 2+/4 patella and achilles\par PULSES: 2+ DP/PT pulses\par

## 2019-02-19 NOTE — PROCEDURE
[de-identified] : Injection: Right knee joint.\par Indication: Osteoarthritis.\par \par A discussion was had with the patient regarding this procedure and all questions were answered. All risks, benefits and alternatives were discussed. These included but were not limited to bleeding, infection, allergic reaction and reaccumulation of fluid. A timeout was done to ensure correct side and pt agreed to the procedure.  Alcohol was used to clean the skin, and betadine was used to sterilize and prep the area in the lateral joint line aspect of the knee. Ethyl chloride spray was then used as a topical anesthetic. Following vendor guidelines and sterile technique 5cc of Zilretta injectable solution was prepared in a 10cc syringe.  A 22-gauge needle was used to inject 5cc of Zilretta solution and 5cc 1% lidocaine without epinephrine. A sterile bandage was then applied. The patient tolerated the procedure well.\par \par Zilretta\par Lot: [id number]\par Exp: 08/2020

## 2019-02-19 NOTE — DISCUSSION/SUMMARY
[de-identified] : Patient was seen today for continued management of right knee pain secondary to his osteoarthritis. He has had limited relief from cortisone injections in the past, did not have any noted relief from hyaluronic acid. We obtained insurance authorization for a trial of Zilretta.  He was treated with a right knee intra-articular long acting cortisone injection today (see procedure note).  Informed the patient that the numbing medicine in today's injection will last for about 4-6 hours. The steroid that was injected will start to work in 1 to 2 days, peak at 1-2 weeks, and may last up to 3-4 months. If patient has good relief we may consider a repeat injection in the future as needed. He'll continue his other treatment options as previously discussed.  Patient appreciates and agrees with current plan.\par \par This note was generated using dragon medical dictation software.  A reasonable effort has been made for proofreading its contents, but typos may still remain.  If there are any questions or points of clarification needed please notify my office.

## 2019-02-19 NOTE — HISTORY OF PRESENT ILLNESS
[de-identified] : Patient has a history of knee osteoarthritis.  We discussed treatment options and have obtained insurance authorization to proceed with a long acting corticosteroid injection Zilretta and patient would like to proceed at this time.  No significant interval change in knee pain since last evaluated.

## 2019-04-01 ENCOUNTER — APPOINTMENT (OUTPATIENT)
Dept: VASCULAR SURGERY | Facility: CLINIC | Age: 84
End: 2019-04-01
Payer: MEDICARE

## 2019-04-01 VITALS
DIASTOLIC BLOOD PRESSURE: 73 MMHG | WEIGHT: 193 LBS | HEIGHT: 74 IN | BODY MASS INDEX: 24.77 KG/M2 | SYSTOLIC BLOOD PRESSURE: 151 MMHG | HEART RATE: 59 BPM

## 2019-04-01 PROCEDURE — 93880 EXTRACRANIAL BILAT STUDY: CPT

## 2019-04-01 PROCEDURE — 99214 OFFICE O/P EST MOD 30 MIN: CPT

## 2019-04-01 PROCEDURE — 93925 LOWER EXTREMITY STUDY: CPT

## 2019-04-01 NOTE — PHYSICAL EXAM
[JVD] : no jugular venous distention  [Normal Breath Sounds] : Normal breath sounds [Normal Heart Sounds] : normal heart sounds [2+] : left 2+ [Ankle Swelling (On Exam)] : not present [Varicose Veins Of Lower Extremities] : not present [] : not present [Abdomen Masses] : No abdominal masses [Skin Ulcer] : no ulcer [Oriented to Place] : oriented to place

## 2019-04-01 NOTE — ASSESSMENT
[FreeTextEntry1] : Patient with severe peripheral vascular disease. Patient would require a left femoral popliteal bypass.  Currently patient has minimal complaints of significant rest pain.Followup in 3 months.\par \par Patient with moderate asymptomatic carotid disease. Followup in 6 months.\par \par  Continue conservative management.

## 2019-04-01 NOTE — HISTORY OF PRESENT ILLNESS
[FreeTextEntry1] : Patient with severe peripheral vascular disease, status post femoral-femoral bypass.  Currently patient has minimal complaints of significant rest pain.

## 2019-05-13 NOTE — ED ADULT NURSE NOTE - HEIGHT IN CM
Bed: ED27  Expected date: 5/13/19  Expected time: 5:39 AM  Means of arrival:   Comments:  412  60F  CP   185.42

## 2019-05-23 ENCOUNTER — APPOINTMENT (OUTPATIENT)
Dept: UROLOGY | Facility: CLINIC | Age: 84
End: 2019-05-23
Payer: MEDICARE

## 2019-05-23 DIAGNOSIS — N39.0 URINARY TRACT INFECTION, SITE NOT SPECIFIED: ICD-10-CM

## 2019-05-23 DIAGNOSIS — Z85.46 PERSONAL HISTORY OF MALIGNANT NEOPLASM OF PROSTATE: ICD-10-CM

## 2019-05-23 PROCEDURE — 99213 OFFICE O/P EST LOW 20 MIN: CPT

## 2019-05-23 RX ORDER — SULFAMETHOXAZOLE AND TRIMETHOPRIM 400; 80 MG/1; MG/1
400-80 TABLET ORAL
Qty: 60 | Refills: 3 | Status: DISCONTINUED | COMMUNITY
Start: 2018-09-05 | End: 2019-05-23

## 2019-05-23 NOTE — ASSESSMENT
[FreeTextEntry1] : Based on antibiotic sensitivity panel, we could switch from Bactrim to Keflex 250 mg every other day. Side effects discussed. He will continue to remain hydrated and continue with self catheterizations. He can followup in 6 months.\par \par Yuan Rubin MD, FACS\par [facility]\par  of Urology\par \par [address], [address]\[place] [address]\par \par [address], [address]\[place] [address]\par \par Tel: [phone]\par Fax: [phone]

## 2019-05-23 NOTE — PHYSICAL EXAM
[General Appearance - Well Developed] : well developed [General Appearance - Well Nourished] : well nourished [Normal Appearance] : normal appearance [Well Groomed] : well groomed [General Appearance - In No Acute Distress] : no acute distress [Abdomen Soft] : soft [Abdomen Tenderness] : non-tender [Costovertebral Angle Tenderness] : no ~M costovertebral angle tenderness [Urethral Meatus] : meatus normal [Urinary Bladder Findings] : the bladder was normal on palpation [Scrotum] : the scrotum was normal [Testes Mass (___cm)] : there were no testicular masses [] : no respiratory distress [Respiration, Rhythm And Depth] : normal respiratory rhythm and effort [Exaggerated Use Of Accessory Muscles For Inspiration] : no accessory muscle use [FreeTextEntry1] : using a walker.

## 2019-05-23 NOTE — LETTER BODY
[Dear  ___] : Dear  [unfilled], [Consult Letter:] : I had the pleasure of evaluating your patient, [unfilled]. [Consult Closing:] : Thank you very much for allowing me to participate in the care of this patient.  If you have any questions, please do not hesitate to contact me. [FreeTextEntry1] : ACP\par [address] \par [place], [place], [address]  \par [phone] \par

## 2019-05-23 NOTE — HISTORY OF PRESENT ILLNESS
[FreeTextEntry1] : He is an 85 year old man and who is seen today for multiple urological issues. He was started on prophylactic Bactrim every other day last year and has not had any episodes of UTIs. His nephrologist, Dr. Libby Prasad, told him that creatinine level increased and would rather not be on Bactrim. From a urinary standpoint he is performing self catheterizations without difficulty. Residual urine today was minimal. The patient believes that the last PSA at [facility] was 8.\par \par Previous notes: He has been on self catheterizations for chronic urinary retention for almost 2 years. He has had multiple persistent and chronic UTIs. He urinates a small amount in between. He has been treated with antibiotics a few times. In 2004, he underwent radiation therapy for prostate cancer. Also, a penile prosthesis was placed. His PSA levels have been elevated but stable. PSA level was 7.7 in August 2018. According to the patient, metastatic workups have been negative. CT scan in July 2017 showed a left renal cyst. Urine culture in July 2018 showed Escherichia coli. Creatinine was 2.2. 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

## 2019-05-27 LAB — BACTERIA UR CULT: ABNORMAL

## 2019-07-30 ENCOUNTER — MESSAGE (OUTPATIENT)
Age: 84
End: 2019-07-30

## 2019-07-30 RX ORDER — NITROFURANTOIN (MONOHYDRATE/MACROCRYSTALS) 25; 75 MG/1; MG/1
100 CAPSULE ORAL
Qty: 45 | Refills: 1 | Status: ACTIVE | COMMUNITY
Start: 2019-07-30 | End: 1900-01-01

## 2019-07-30 RX ORDER — CEPHALEXIN 250 MG/1
250 CAPSULE ORAL
Qty: 90 | Refills: 1 | Status: DISCONTINUED | COMMUNITY
Start: 2019-05-23 | End: 2019-07-30

## 2019-08-19 ENCOUNTER — APPOINTMENT (OUTPATIENT)
Dept: VASCULAR SURGERY | Facility: CLINIC | Age: 84
End: 2019-08-19
Payer: MEDICARE

## 2019-08-19 PROCEDURE — 99214 OFFICE O/P EST MOD 30 MIN: CPT

## 2019-08-19 PROCEDURE — 93925 LOWER EXTREMITY STUDY: CPT

## 2019-08-19 NOTE — ASSESSMENT
[FreeTextEntry1] : Patient with severe peripheral vascular disease. Patient would require a left femoral popliteal bypass.  Currently patient has minimal complaints of significant rest pain.Followup in 3 months.\par \par Patient with moderate asymptomatic carotid disease. Followup in 6 months.\par \par  Continue conservative management. [FreeTextEntry1] : growth

## 2019-08-19 NOTE — PHYSICAL EXAM
[Normal Breath Sounds] : Normal breath sounds [JVD] : no jugular venous distention  [Normal Heart Sounds] : normal heart sounds [2+] : left 2+ [Ankle Swelling (On Exam)] : not present [Varicose Veins Of Lower Extremities] : not present [] : not present [Abdomen Masses] : No abdominal masses [Skin Ulcer] : no ulcer [Oriented to Place] : oriented to place

## 2019-08-23 ENCOUNTER — APPOINTMENT (OUTPATIENT)
Dept: PULMONOLOGY | Facility: CLINIC | Age: 84
End: 2019-08-23
Payer: MEDICARE

## 2019-08-23 VITALS
WEIGHT: 195 LBS | OXYGEN SATURATION: 95 % | BODY MASS INDEX: 25.03 KG/M2 | TEMPERATURE: 97.7 F | SYSTOLIC BLOOD PRESSURE: 103 MMHG | HEART RATE: 63 BPM | HEIGHT: 74 IN | DIASTOLIC BLOOD PRESSURE: 62 MMHG

## 2019-08-23 DIAGNOSIS — J44.9 CHRONIC OBSTRUCTIVE PULMONARY DISEASE, UNSPECIFIED: ICD-10-CM

## 2019-08-23 DIAGNOSIS — R91.1 SOLITARY PULMONARY NODULE: ICD-10-CM

## 2019-08-23 PROCEDURE — 99204 OFFICE O/P NEW MOD 45 MIN: CPT

## 2019-08-23 NOTE — ASSESSMENT
[FreeTextEntry1] : \par PATIENT EMIGDIO REEVES MRN [id number]    1934  INITIAL VISIT (IV)  2019 AGE (IV)  85 SEX M CONTACT  REFERRING DR BIENVENIDO PARMAR DO  \par \par DATE OF VISIT    \par 2019\par \par \par REASON FOR VISIT\par Please see PULMONARY PROBLEMS \par \par REFERING MD \par SR BIENVENIDO PARMAR \par \par TYPE OF VISIT \par Initial Pulmonary evaluation\par \par PATIENT EMIGDIO REEVES MRN [id number]    1934  INITIAL VISIT (IV)  2019 AGE (IV)  85 SEX M CONTACT  REFERRING DR BIENVENIDO PARMAR DO  \par \par DATE COMPLAINTS NOTABLE POINTS  ROS  PE    \par 2019  Initial visit Has HO lung nodule and COPD and wants evaluation and followup \par \par PATIENT EMIGDIO REEVES MRN [id number]    1934  INITIAL VISIT (IV)  2019 AGE (IV)  85 SEX M CONTACT  REFERRING DR BIENVENIDO PARMAR DO  \par \par PT DESCRIPTION  SUMMARY   \par PMH \par Colon CA 2004 surgery and chemo \par Has l colostomy (from ischemic colitis later )  asthma \par a fib on Xarelto (2019)  CHF  on Entresto 15 (2019)  Htn PVD  Dr Reddy Vasc [facility] \par CKD 3 prostate ca \par Does self-catheterizes HO Urine retn \par 2019 Has blockage in L leg \par \par MEDS \par 2019 \par Xarelto 15  asa 81 coreg 12.5 entresto () aranesp tricor gabapentin tramadol percocet temazepam 15 B12 \par \par HABITS \par Smoked 1 ppd Quit at age 40 \par \par OCCUPN \par [place]  retd \par \par FAM \par No cancer\par    \par Home equipment\par 2019 Has nocturnal O2 Not on cpap Has nebulizers \par                            \par \par \par PROBLEM ASSESSMENT PLAN                             \par LUNG NODULES\par BACKGROUND \par Former smoker Ho lung nodule \par \par \par S\par 2019 No hemoptysis No wt loss\par \par CT \par CT Chest nc [facility] 2018 \par cw CT Ch 2011 and CTAP 2014 \par 1) sp median sternotomy\par 2) L sided ppm \par 3) No lne \par 4) Stable calci l ilar ln\par 5) Interval increase in size of rll nodule currently 1.4 cm prev 8 mm \par 6) Persistent reticular opac lll \par 7) Residual l pl thickening \par 8) Calci granuloam lll \par 9) Interval decrease in bl effsns cw 2014 \par 10) SHIRA 4 cm \par \par \par PET [facility] 2018 \par (Remote colon ca 2004 Prostate ca 2005) \par Mild but not focal FDG uptake corresponding to a RLL nodule SUV 1.3 1 x1 cm (prev 1.4x 1 ) \par \par \par AR\par 2019 refer CT chest \par 2019 RTC 3 w\par \par COPD \par BACKGROUND \par Quit smoking 40 y 1 ppd \par S\par 2019 Is not v active \par 2019 Cannot keep up with wife because he gets pain in legs and has arthritis \par \par PULSE OX\par 2018 99% 60\par \par PIA \par 2018  109% FEV1 238 88% FEV1% 56% \par Mild obstrn \par \par MEDS \par Albuterol \par Azmanx hs \par \par AR\par 2019 Asvised to bring all inhalers next visit\par \par \par

## 2019-08-23 NOTE — REVIEW OF SYSTEMS
[Dyspnea] : dyspnea [Negative] : Sleep Disorder [FreeTextEntry6] : pain on walking  severe arthritis knees

## 2019-08-23 NOTE — REASON FOR VISIT
[Consultation] : a consultation visit [COPD] : COPD [Spouse] : spouse [FreeTextEntry2] : lung nodule

## 2019-09-10 ENCOUNTER — INPATIENT (INPATIENT)
Facility: HOSPITAL | Age: 84
LOS: 9 days | DRG: 602 | End: 2019-09-20
Attending: HOSPITALIST | Admitting: STUDENT IN AN ORGANIZED HEALTH CARE EDUCATION/TRAINING PROGRAM
Payer: COMMERCIAL

## 2019-09-10 VITALS
DIASTOLIC BLOOD PRESSURE: 58 MMHG | SYSTOLIC BLOOD PRESSURE: 115 MMHG | RESPIRATION RATE: 18 BRPM | OXYGEN SATURATION: 96 % | HEART RATE: 75 BPM | TEMPERATURE: 98 F

## 2019-09-10 DIAGNOSIS — I25.10 ATHEROSCLEROTIC HEART DISEASE OF NATIVE CORONARY ARTERY WITHOUT ANGINA PECTORIS: ICD-10-CM

## 2019-09-10 DIAGNOSIS — Z29.9 ENCOUNTER FOR PROPHYLACTIC MEASURES, UNSPECIFIED: ICD-10-CM

## 2019-09-10 DIAGNOSIS — D51.9 VITAMIN B12 DEFICIENCY ANEMIA, UNSPECIFIED: ICD-10-CM

## 2019-09-10 DIAGNOSIS — S81.802A UNSPECIFIED OPEN WOUND, LEFT LOWER LEG, INITIAL ENCOUNTER: ICD-10-CM

## 2019-09-10 DIAGNOSIS — N18.9 CHRONIC KIDNEY DISEASE, UNSPECIFIED: ICD-10-CM

## 2019-09-10 DIAGNOSIS — J18.9 PNEUMONIA, UNSPECIFIED ORGANISM: ICD-10-CM

## 2019-09-10 DIAGNOSIS — I10 ESSENTIAL (PRIMARY) HYPERTENSION: ICD-10-CM

## 2019-09-10 DIAGNOSIS — L03.116 CELLULITIS OF LEFT LOWER LIMB: ICD-10-CM

## 2019-09-10 DIAGNOSIS — Z43.3 ENCOUNTER FOR ATTENTION TO COLOSTOMY: Chronic | ICD-10-CM

## 2019-09-10 DIAGNOSIS — I48.91 UNSPECIFIED ATRIAL FIBRILLATION: ICD-10-CM

## 2019-09-10 DIAGNOSIS — I50.9 HEART FAILURE, UNSPECIFIED: ICD-10-CM

## 2019-09-10 LAB
ALBUMIN SERPL ELPH-MCNC: 3.2 G/DL — LOW (ref 3.3–5)
ALP SERPL-CCNC: 38 U/L — LOW (ref 40–120)
ALT FLD-CCNC: 17 U/L — SIGNIFICANT CHANGE UP (ref 12–78)
ANION GAP SERPL CALC-SCNC: 8 MMOL/L — SIGNIFICANT CHANGE UP (ref 5–17)
APPEARANCE UR: CLEAR — SIGNIFICANT CHANGE UP
AST SERPL-CCNC: 22 U/L — SIGNIFICANT CHANGE UP (ref 15–37)
BASOPHILS # BLD AUTO: 0.04 K/UL — SIGNIFICANT CHANGE UP (ref 0–0.2)
BASOPHILS NFR BLD AUTO: 0.5 % — SIGNIFICANT CHANGE UP (ref 0–2)
BILIRUB SERPL-MCNC: 0.3 MG/DL — SIGNIFICANT CHANGE UP (ref 0.2–1.2)
BILIRUB UR-MCNC: NEGATIVE — SIGNIFICANT CHANGE UP
BUN SERPL-MCNC: 30 MG/DL — HIGH (ref 7–23)
CALCIUM SERPL-MCNC: 9.3 MG/DL — SIGNIFICANT CHANGE UP (ref 8.5–10.1)
CHLORIDE SERPL-SCNC: 96 MMOL/L — SIGNIFICANT CHANGE UP (ref 96–108)
CO2 SERPL-SCNC: 28 MMOL/L — SIGNIFICANT CHANGE UP (ref 22–31)
COLOR SPEC: YELLOW — SIGNIFICANT CHANGE UP
CREAT SERPL-MCNC: 1.5 MG/DL — HIGH (ref 0.5–1.3)
DIFF PNL FLD: ABNORMAL
EOSINOPHIL # BLD AUTO: 0.67 K/UL — HIGH (ref 0–0.5)
EOSINOPHIL NFR BLD AUTO: 8.2 % — HIGH (ref 0–6)
GLUCOSE SERPL-MCNC: 109 MG/DL — HIGH (ref 70–99)
GLUCOSE UR QL: NEGATIVE — SIGNIFICANT CHANGE UP
HCT VFR BLD CALC: 25.4 % — LOW (ref 39–50)
HGB BLD-MCNC: 8 G/DL — LOW (ref 13–17)
IMM GRANULOCYTES NFR BLD AUTO: 0.4 % — SIGNIFICANT CHANGE UP (ref 0–1.5)
KETONES UR-MCNC: NEGATIVE — SIGNIFICANT CHANGE UP
LACTATE SERPL-SCNC: 1 MMOL/L — SIGNIFICANT CHANGE UP (ref 0.7–2)
LEUKOCYTE ESTERASE UR-ACNC: ABNORMAL
LYMPHOCYTES # BLD AUTO: 0.87 K/UL — LOW (ref 1–3.3)
LYMPHOCYTES # BLD AUTO: 10.7 % — LOW (ref 13–44)
MCHC RBC-ENTMCNC: 30.8 PG — SIGNIFICANT CHANGE UP (ref 27–34)
MCHC RBC-ENTMCNC: 31.5 GM/DL — LOW (ref 32–36)
MCV RBC AUTO: 97.7 FL — SIGNIFICANT CHANGE UP (ref 80–100)
MONOCYTES # BLD AUTO: 0.98 K/UL — HIGH (ref 0–0.9)
MONOCYTES NFR BLD AUTO: 12 % — SIGNIFICANT CHANGE UP (ref 2–14)
NEUTROPHILS # BLD AUTO: 5.57 K/UL — SIGNIFICANT CHANGE UP (ref 1.8–7.4)
NEUTROPHILS NFR BLD AUTO: 68.2 % — SIGNIFICANT CHANGE UP (ref 43–77)
NITRITE UR-MCNC: NEGATIVE — SIGNIFICANT CHANGE UP
NRBC # BLD: 0 /100 WBCS — SIGNIFICANT CHANGE UP (ref 0–0)
NT-PROBNP SERPL-SCNC: 8707 PG/ML — HIGH (ref 0–450)
PH UR: 5 — SIGNIFICANT CHANGE UP (ref 5–8)
PLATELET # BLD AUTO: 192 K/UL — SIGNIFICANT CHANGE UP (ref 150–400)
POTASSIUM SERPL-MCNC: 4.8 MMOL/L — SIGNIFICANT CHANGE UP (ref 3.5–5.3)
POTASSIUM SERPL-SCNC: 4.8 MMOL/L — SIGNIFICANT CHANGE UP (ref 3.5–5.3)
PROT SERPL-MCNC: 6.6 G/DL — SIGNIFICANT CHANGE UP (ref 6–8.3)
PROT UR-MCNC: NEGATIVE — SIGNIFICANT CHANGE UP
RBC # BLD: 2.6 M/UL — LOW (ref 4.2–5.8)
RBC # FLD: 15.1 % — HIGH (ref 10.3–14.5)
SODIUM SERPL-SCNC: 132 MMOL/L — LOW (ref 135–145)
SP GR SPEC: 1 — LOW (ref 1.01–1.02)
UROBILINOGEN FLD QL: NEGATIVE — SIGNIFICANT CHANGE UP
WBC # BLD: 8.16 K/UL — SIGNIFICANT CHANGE UP (ref 3.8–10.5)
WBC # FLD AUTO: 8.16 K/UL — SIGNIFICANT CHANGE UP (ref 3.8–10.5)

## 2019-09-10 PROCEDURE — 99223 1ST HOSP IP/OBS HIGH 75: CPT | Mod: GC,AI

## 2019-09-10 PROCEDURE — 71045 X-RAY EXAM CHEST 1 VIEW: CPT | Mod: 26

## 2019-09-10 PROCEDURE — 99285 EMERGENCY DEPT VISIT HI MDM: CPT

## 2019-09-10 PROCEDURE — 93010 ELECTROCARDIOGRAM REPORT: CPT

## 2019-09-10 RX ORDER — INFLUENZA VIRUS VACCINE 15; 15; 15; 15 UG/.5ML; UG/.5ML; UG/.5ML; UG/.5ML
0.5 SUSPENSION INTRAMUSCULAR ONCE
Refills: 0 | Status: DISCONTINUED | OUTPATIENT
Start: 2019-09-10 | End: 2019-09-20

## 2019-09-10 RX ORDER — VANCOMYCIN HCL 1 G
1000 VIAL (EA) INTRAVENOUS ONCE
Refills: 0 | Status: COMPLETED | OUTPATIENT
Start: 2019-09-10 | End: 2019-09-10

## 2019-09-10 RX ORDER — PIPERACILLIN AND TAZOBACTAM 4; .5 G/20ML; G/20ML
3.38 INJECTION, POWDER, LYOPHILIZED, FOR SOLUTION INTRAVENOUS ONCE
Refills: 0 | Status: COMPLETED | OUTPATIENT
Start: 2019-09-10 | End: 2019-09-10

## 2019-09-10 RX ORDER — IPRATROPIUM/ALBUTEROL SULFATE 18-103MCG
3 AEROSOL WITH ADAPTER (GRAM) INHALATION ONCE
Refills: 0 | Status: COMPLETED | OUTPATIENT
Start: 2019-09-10 | End: 2019-09-10

## 2019-09-10 RX ADMIN — PIPERACILLIN AND TAZOBACTAM 3.38 GRAM(S): 4; .5 INJECTION, POWDER, LYOPHILIZED, FOR SOLUTION INTRAVENOUS at 19:55

## 2019-09-10 RX ADMIN — PIPERACILLIN AND TAZOBACTAM 200 GRAM(S): 4; .5 INJECTION, POWDER, LYOPHILIZED, FOR SOLUTION INTRAVENOUS at 19:25

## 2019-09-10 RX ADMIN — Medication 3 MILLILITER(S): at 20:12

## 2019-09-10 RX ADMIN — Medication 100 MILLIGRAM(S): at 20:37

## 2019-09-10 RX ADMIN — Medication 250 MILLIGRAM(S): at 20:12

## 2019-09-10 RX ADMIN — Medication 1000 MILLIGRAM(S): at 21:12

## 2019-09-10 NOTE — H&P ADULT - NSHPSOCIALHISTORY_GEN_ALL_CORE
, lives at home with wife. Ambulates independently at home, uses wheelchair when needs to go longer distances. Retired [place] . Former smoker, quit >40 years ago. Former drinker, quit ~1993.   Received pneumococcal vaccination  Has not received flu shot this year

## 2019-09-10 NOTE — H&P ADULT - PROBLEM SELECTOR PLAN 6
Chronic, rate controlled, on Xarelto   -Continue home Coreg with hold parameters  -Continue Xarelto Chronic, rate controlled, on Xarelto   -Continue home Coreg 12.5 - 1 1/2 BID with hold parameters  -Continue Xarelto Chronic, rate controlled, on Xarelto   -Continue Xarelto

## 2019-09-10 NOTE — H&P ADULT - PROBLEM SELECTOR PLAN 3
Acute   -Pt presenting with 5 days diarrhea in setting of UTI prophylaxis (Keflex 200mg PO every other day) Acute   -Pt presenting with 5 days diarrhea in setting of UTI prophylaxis (Keflex 200mg PO every other day)  -F/U C diff PCR Acute on chronic systolic CHF exacerbation   -BNP 8707  -Last known Echo 2016, EF 30%  -Pt sees Dr. Moore, may have gotten more recent Echo, pt cannot recall   -Echo ordered, f/u results  -Per pt, home torsemide is adjusted routinely by Dr. Moore based on pt weight and volume status  -Start Lasix 40mg IV BID   -Cardio consulted, f/u recs   -Strict I&Os, daily weights, HOB elevated

## 2019-09-10 NOTE — H&P ADULT - NSHPPHYSICALEXAM_GEN_ALL_CORE
T(C): 36.4 (09-10-19 @ 22:02), Max: 36.8 (09-10-19 @ 19:20)  HR: 59 (09-10-19 @ 22:02) (59 - 75)  BP: 116/68 (09-10-19 @ 22:02) (115/58 - 117/73)  RR: 15 (09-10-19 @ 22:02) (15 - 18)  SpO2: 97% (09-10-19 @ 22:02) (96% - 97%)    GENERAL: Patient appears ill, no acute distress, appropriate, pleasant  EYES: Sclera clear, no exudates  ENMT: Moist mucous membranes  LUNGS: Decreased BS L>R, scattered b/l rhonchi and end expiratory wheezing L>R  HEART: +S1/S2, RRR, no murmurs or gallops noted, 2+ b/l LE pitting edema   GASTROINTESTINAL: Abdomen is soft, nontender, mildly distended, hypoactive bowel sounds, colostomy bag intact, dry, clean   INTEGUMENT: +Left LE cellulitis with 1.5in eschar in center. Dry cracked skin x4 extremities. Peeling skin observed on back. Right LE erythematous patches noted    MUSCULOSKELETAL: Amputation of right 4th and 5th metatarsals   NEUROLOGIC: AA&Ox3, motor strength grossly intact  PSYCHIATRIC: Mood is good, affect is congruent, linear and logical thought process  HEME/LYMPH: +B/l UE areas of ecchymosis

## 2019-09-10 NOTE — ED ADULT NURSE NOTE - NSIMPLEMENTINTERV_GEN_ALL_ED
Implemented All Fall with Harm Risk Interventions:  [place] to call system. Call bell, personal items and telephone within reach. Instruct patient to call for assistance. Room bathroom lighting operational. Non-slip footwear when patient is off stretcher. Physically safe environment: no spills, clutter or unnecessary equipment. Stretcher in lowest position, wheels locked, appropriate side rails in place. Provide visual cue, wrist band, yellow gown, etc. Monitor gait and stability. Monitor for mental status changes and reorient to person, place, and time. Review medications for side effects contributing to fall risk. Reinforce activity limits and safety measures with patient and family. Provide visual clues: red socks.

## 2019-09-10 NOTE — H&P ADULT - ASSESSMENT
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 Pt also admits to wheelchair induced injury to the left LE resulting in 10 stiches on 9/4/2019 now presenting with left LE cellulitis w/ 1.5in central eschar. Per pt, [facility] which resulted in worsening b/l LE edema. Pt admitted for left LE cellulitis and left lower lobe PNA. 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 Admit for cellulitis and decompensated heart failure. 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 Admit for cellulitis and decompensated heart failure.

## 2019-09-10 NOTE — H&P ADULT - PROBLEM SELECTOR PLAN 5
Chronic   -Pt received transfusions x3 08/2019 which resulted in worsening b/l LE edema   -Monitor H/H   -Transfuse PRN  -F/u B12 levels Chronic   -Pt received transfusions x3 08/2019 which resulted in worsening b/l LE edema   -Monitor H/H , type and screen  -Transfuse PRN  -F/u B12 levels

## 2019-09-10 NOTE — H&P ADULT - PROBLEM SELECTOR PLAN 9
Chronic, s/p CABG x3 2004  -Continue home ASA 81mg PO qd  -Continue home statin for HLD Chronic, s/p CABG x3 2004  -Continue home ASA 81mg PO qd  -Continue home statin for HLD ----  -Continue fenofibrate 145mg PO qd for HLD Chronic, s/p CABG x3 2004  -Continue home ASA 81mg PO qd  -Continue home statin for HLD  - c/w entresto   -Continue fenofibrate 145mg PO qd for HLD

## 2019-09-10 NOTE — H&P ADULT - PROBLEM SELECTOR PLAN 4
Acute on chronic systolic CHF exacerbation like 2/2 PNA    -BNP 8707  -Last known Echo 2016, EF 30%  -Pt sees Dr. Moore, may have gotten more recent Echo, pt cannot recall   -Echo ordered, f/u results   -Per pt, home torsemide is adjusted routinely by Dr. Moore based on pt weight and volume status  -Start Lasix 40mg IV BID   -Cardio consulted, f/u recs  -Strict I&Os, daily weights, HOB elevated Acute on chronic systolic CHF exacerbation like 2/2 PNA    -BNP 8707  -Last known Echo 2016, EF 30%  -Pt sees Dr. Moore, may have gotten more recent Echo, pt cannot recall   -Echo ordered, f/u results ------  -Per pt, home torsemide is adjusted routinely by Dr. Moore based on pt weight and volume status  -Start Lasix 40mg IV BID -----  -Cardio consulted, f/u recs -----  -Strict I&Os, daily weights, HOB elevated Acute on chronic systolic CHF exacerbation like 2/2 PNA    -BNP 8707  -Last known Echo 2016, EF 30%  -Pt sees Dr. Moore, may have gotten more recent Echo, pt cannot recall   -Echo ordered, f/u results  -Per pt, home torsemide is adjusted routinely by Dr. Moore based on pt weight and volume status  -Start Lasix 40mg IV BID   -Cardio consulted, f/u recs -----  -Strict I&Os, daily weights, HOB elevated - LBBB noted on EKG. not on prior EKG 2018.   - no active CP, SOB   - check cardiac enzymes.  - cardio consulted.

## 2019-09-10 NOTE — H&P ADULT - PROBLEM SELECTOR PLAN 2
Acute left lower lobe, CAP vs HCAP ([facility])  -IV Zosyn and IV Vanco as above   -F/u AM labs, blood and urine cultures  -CT chest ordered. Pt saw pulm (Dr. Singh) 8/23/19 Acute left lower lobe, CAP vs HCAP ([facility])  -IV Zosyn and IV Vanco as above   -F/u AM labs, blood and urine cultures  -Chest PT, suctioning PRN   -CT chest ordered. Pt saw pulm (Dr. Singh) 8/23/19 for a lung nodule. A CT chest had demonstrated an interval increase in size of RLL nodule currently 1.4 cm prev 8 mm in 2014. Ordered 3 week f/u at that time Acute left lower lobe, CAP vs HCAP ([facility])  -Duonebs q6h PRN   -IV Zosyn and IV Vanco as above   -F/u AM labs, blood and urine cultures  -Chest PT, suctioning PRN   -CT chest ordered. Pt saw pulm (Dr. Singh) 8/23/19 for a lung nodule. A CT chest had demonstrated an interval increase in size of RLL nodule currently 1.4 cm prev 8 mm in 2014. Ordered 3 week f/u at that time ------ Acute left lower lobe, CAP vs HCAP ([facility])  -Duonebs q6h   -IV Zosyn and IV Vanco as above   -F/u AM labs, blood and urine cultures  -Chest PT, suctioning PRN   -CT chest ordered. Pt saw pulm (Dr. Singh) 8/23/19 for a lung nodule. A CT chest had demonstrated an interval increase in size of RLL nodule currently 1.4 cm prev 8 mm in 2014. Ordered 3 week f/u at that time ------ Acute left lower lobe, CAP vs HCAP ([facility])  -Duonebs q6h   -Tessalon perles  -IV Zosyn and IV Vanco as above   -F/u AM labs, blood and urine cultures  -Chest PT, suctioning PRN   -CT chest ordered. Pt saw pulm (Dr. Singh) 8/23/19 for a lung nodule. A CT chest had demonstrated an interval increase in size of RLL nodule currently 1.4 cm prev 8 mm in 2014. Ordered 3 week f/u at that time ------ Acute, s/p traumatic injury requiring 10 sutures   -Left medial LE warm, tender and erythematous w/ central eschar & known vascular disease on this leg  -Start IV Zosyn and IV vanco   -Wound care consulted  -Follow up blood cultures  -Follow up AM labs

## 2019-09-10 NOTE — ED ADULT NURSE NOTE - OBJECTIVE STATEMENT
Pt. received alert and oriented x3 with chief complaint of generalized weakness. Pt. presents w/ stage 1 pressure ulcer to sacral area and wound to left lateral lower extremity, black/warm post laceration one week ago. Pt. also presents w/ bilateral lower extremity edema. Pt. on chronic 2L nasal cannula. Pt. placed on continuous cardiac monitor with continuous pulse ox. EKG performed at bedside upon arrival. Pt. denies fever/chills/body aches. Pt. received alert and oriented x3 with chief complaint of generalized weakness. Pt. presents w/ stage 1 pressure ulcer to sacral area and wound to left lateral lower extremity, black/warm post laceration one week ago. Pt. also presents w/ bilateral lower extremity edema. Pt. on chronic 2L nasal cannula. Pt. placed on continuous cardiac monitor with continuous pulse ox. EKG performed at bedside upon arrival. Pt. denies fever/chills/body aches. Pt. also presents w/ left sided colostomy.

## 2019-09-10 NOTE — H&P ADULT - ATTENDING COMMENTS
** PT RECEIVES MEDS FROM TWO SEPARATE PHARMACIES- MEDS CONFIRMED WITH [facility]. UNABLE TO CON[facility]IRM MED[facility] FROM [facility]. WILL NEED TO VERIFY IN AM **

## 2019-09-10 NOTE — ED PROVIDER NOTE - OBJECTIVE STATEMENT
85 y male presents with left le wound cellulitis, cough, wife states [facility], sustained a left leg wound from wheelchair.  today she noted patient was weak, patient denies chest pain, abdominal pain,  no sob,  states cough is chronic, states he has hx of pna in the past.  wife states patient has not had fever, no vomiting, no recent falls. wife states patient has hx of chronic uti, is on maintenance abx.   PMd Dr Alvarado

## 2019-09-10 NOTE — H&P ADULT - PROBLEM SELECTOR PLAN 7
Chronic, stable  -Continue home Coreg with hold parameters Chronic, stable  -Continue home Coreg - 1 1/2 BID with hold parameters  -Entresto 24-26mg PO BID ----- hold?

## 2019-09-10 NOTE — H&P ADULT - NSHPREVIEWOFSYSTEMS_GEN_ALL_CORE
Constitutional: Admits to generalized weakness and lethargy. Denies fever, chills   HEENT: Denies changes in vision   Respiratory: Admits to productive cough w/ brown sputum. Denies SOB, TRUJILLO, hemoptysis  Cardiovascular: Admits to worsening b/l peripheral edema. Denies chest pain, palpitations  Gastrointestinal: Admits to diarrhea. Denies nausea, vomiting, abdominal pain, melena, hematochezia   Genitourinary: Admits to mild dysuria. Denies frequency, hematuria   Skin/Breast: Admits to left LE cellulitis    Neurologic: Admits to chronic peripheral neuropathy. Denies headache, dizziness    ROS negative except as noted above

## 2019-09-10 NOTE — H&P ADULT - NSICDXFAMILYHX_GEN_ALL_CORE_FT
FAMILY HISTORY:  Family history of colon cancer  Family history of MI (myocardial infarction)  FH: HTN (hypertension)  FH: prostate cancer

## 2019-09-10 NOTE — H&P ADULT - PROBLEM SELECTOR PLAN 8
Chronic  -Pt @ baseline Cr 1.5  -Monitor BMP and GFR in setting of vanco dosing and Xarelto   -Avoid nephrotoxic drugs Chronic  -Pt @ baseline Cr 1.5  -Monitor BMP and GFR in setting of vanco dosing and Xarelto   -Avoid nephrotoxic drugs  -Nephro consult?

## 2019-09-10 NOTE — H&P ADULT - HISTORY OF PRESENT ILLNESS
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 Pt also admits to wheelchair induced injury to the left LE resulting in 10 stiches on 9/4/2019 now presenting with left LE cellulitis w/ 1.5in central eschar. Per pt, [facility] which resulted in worsening b/l LE edema. Pt also admits to diarrhea x5 days. Pt denies recent travel or sick contacts. Pt denies fevers, chills, dizziness, headache, SOB, TRUJILLO, chest pain, palpitations, abdominal pain, N/V, worsening peripheral neuropathy.     In the ED, VS T=98.2F, HR 61, /73, RR 16, SpO2 96% 2L NC.   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 Pt also admits to wheelchair induced injury to the left LE resulting in 10 stiches on 9/4/2019 now pt stating area is cellulitic but it has not been treated outpatient. Per pt, he [facility] which resulted in worsening b/l LE edema. Pt also admits to diarrhea x5 days- non bloody, no abdominal pain, nausea or vomiting. Pt denies recent travel or sick contacts. Pt denies dizziness, headache, TRUJILLO, chest pain, palpitations, worsening peripheral neuropathy.     In the ED, VS T=98.2F, HR 61, /73, RR 16, SpO2 96% 2L NC.   Labs significant for H/H 8/25.4, Na+ 132, proBNP 8707  UA = lg amount of blood, mod leuk esterase   EKG: Sinus rhythm w/ 1st degree AV block, LAD, LBBB - changed from last available Aug 2018  CXR: appears mostly unchanged from prior with chronic changes- will f/u formal read. 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 Pt also admits to wheelchair induced injury to the left LE resulting in 10 stiches on 9/4/2019 now pt stating area is cellulitic but it has not been treated outpatient. Per pt, [facility] which resulted in worsening b/l LE edema. Pt also admits to diarrhea x5 days- non bloody, no abdominal pain, nausea or vomiting. Pt denies recent travel or sick contacts. Pt denies dizziness, headache, TRUJILLO, chest pain, palpitations, worsening peripheral neuropathy.     In the ED, VS T=98.2F, HR 61, /73, RR 16, SpO2 96% 2L NC.   Labs significant for H/H 8/25.4, Na+ 132, proBNP 8707  UA = lg amount of blood, mod leuk esterase   EKG: Sinus rhythm w/ 1st degree AV block, LAD, LBBB - changed from last available Aug 2018  CXR: appears mostly unchanged from prior with chronic changes- will f/u formal read.

## 2019-09-10 NOTE — H&P ADULT - NSICDXPASTSURGICALHX_GEN_ALL_CORE_FT
PAST SURGICAL HISTORY:  Cataract extraction status of eye, right     Colostomy care     S/P amputation 1-3 digits of Right foot    S/P bypass graft of extremity Left to right femoral-femoral artery bypass graft 10 years ago    S/P CABG x 3     S/P cardiac pacemaker procedure AICD    S/P cholecystectomy     S/P colon resection     S/P colostomy     S/P small bowel resection

## 2019-09-10 NOTE — ED PROVIDER NOTE - CARE PLAN
Principal Discharge DX:	Wound of lower extremity, left, initial encounter  Secondary Diagnosis:	Cellulitis of left lower extremity  Secondary Diagnosis:	Cough

## 2019-09-10 NOTE — H&P ADULT - PROBLEM SELECTOR PLAN 1
Acute, s/p traumatic injury requiring 10 sutures   -Left medial LE warm, tender and erythematous w/ central eschar & known vascular disease on this leg  -Given pt history of dysuria, chronic UTIs and home straight cath, there is concern for superimposed acute UTI  -Start IV Zosyn and IV vanco with serial vanc trough in setting of CKD3   -ID consulted   -Wound care consulted  -Follow up blood cultures, urine cultures   -Follow up AM labs Acute, s/p traumatic injury requiring 10 sutures   -Left medial LE warm, tender and erythematous w/ central eschar & known vascular disease on this leg  -Given pt history of dysuria, chronic UTIs and home straight cath, there is concern for superimposed acute UTI  -Start IV Zosyn and IV vanco with serial vanc trough in setting of CKD3 -----  -ID consulted -----  -Wound care consulted  -Follow up blood cultures, urine cultures   -Follow up AM labs - p/w generalized weakness. likely multifactorial. acute infection-cellulitis- decompensated heart failure and diarrhea   - c/o cough with sputum production- afebrile, no leukocytosis, less likely infectious etiology however will check legionella and s. pneumo antigen, f./u results of cxr- prelim read appears relatively unchanged from prior- possible increased pulmonary congestion. cough more likely 2/2 volume overload . diurese. monitor strict I&Os.   - weakness also likely 2/2 acute infection - 2/2 acute cellulitis of lower extremity, will start IV abx. f/u blood cultures, trend temp/leukocytosis, am labs.  - diarrhea - pt with recent abx use. will check stool for c diff. stool pcr. hold off on ivf for now given pt appears hypervolemic. will monitor lytes/renal function and volume status closely.   - PT eval

## 2019-09-10 NOTE — H&P ADULT - PROBLEM SELECTOR PLAN 10
-Continue home Xarelto DVT ppx: Continue home Xarelto  Fall precautions    11. Dysuria  -F/u urine cultures DVT ppx: Continue home Xarelto  Fall precautions

## 2019-09-10 NOTE — H&P ADULT - NSICDXPASTMEDICALHX_GEN_ALL_CORE_FT
PAST MEDICAL HISTORY:  Acute MI s/p CABG x3    Afib     Anemia, vitamin B12 deficiency     Automatic implantable cardioverter-defibrillator in situ     CAD (coronary artery disease)     CKD (chronic kidney disease)     Colon cancer s/p chemo    Congestive heart failure     COPD (chronic obstructive pulmonary disease)     HTN (hypertension)     Hypertension     Insomnia     Ischemic bowel disease     Ischemic colitis, enteritis, or enterocolitis s/p partial colectomy    MI (myocardial infarction)     Oxygen dependent wears 2LNC at HS    PAD (peripheral artery disease) s/p stent, fem-fem bypass    Peripheral Neuropathy     Prostate cancer s/p radiation    Vitamin B 12 deficiency

## 2019-09-10 NOTE — ED PROVIDER NOTE - ATTENDING CONTRIBUTION TO CARE
86 yo male hx of pna c/o left lower leg wound with surrounding cellulitis and cough, here with wife.  No fever/chills.  No chest pain, no shortness of breath.    Gen: Alert, NAD  Head/eyes: NC/AT, PERRL  ENT: airway patent  Neck: supple, no tenderness/meningismus/JVD, Trachea midline  Pulm/lung: coarse breath sounds b/l, normal resp effort, no wheeze/stridor/retractions  CV/heart: RRR, no M/R/G, +2 dist pulses (radial, pedal DP/PT, popliteal)  GI/Abd: soft, NT/ND, +BS, no guarding/rebound tenderness  Musculoskeletal: no edema/erythema/cyanosis, FROM in all extremities, no C/T/L spine ttp  Skin: left lower lateral leg +3cm x4cm echar with surrounding erythema and warmth, no drainage  Neuro: AAOx3, CN 2-12 intact, normal sensation, 5/5 motor strength in all extremities    left lower leg cellulitis, with CXR equivocal for underlying pna, IV abx, admit

## 2019-09-11 DIAGNOSIS — R94.31 ABNORMAL ELECTROCARDIOGRAM [ECG] [EKG]: ICD-10-CM

## 2019-09-11 DIAGNOSIS — R53.1 WEAKNESS: ICD-10-CM

## 2019-09-11 LAB
ALBUMIN SERPL ELPH-MCNC: 3.1 G/DL — LOW (ref 3.3–5)
ALP SERPL-CCNC: 37 U/L — LOW (ref 40–120)
ALT FLD-CCNC: 14 U/L — SIGNIFICANT CHANGE UP (ref 12–78)
ANION GAP SERPL CALC-SCNC: 5 MMOL/L — SIGNIFICANT CHANGE UP (ref 5–17)
AST SERPL-CCNC: 16 U/L — SIGNIFICANT CHANGE UP (ref 15–37)
BILIRUB SERPL-MCNC: 0.5 MG/DL — SIGNIFICANT CHANGE UP (ref 0.2–1.2)
BUN SERPL-MCNC: 26 MG/DL — HIGH (ref 7–23)
CALCIUM SERPL-MCNC: 9.1 MG/DL — SIGNIFICANT CHANGE UP (ref 8.5–10.1)
CHLORIDE SERPL-SCNC: 97 MMOL/L — SIGNIFICANT CHANGE UP (ref 96–108)
CK MB BLD-MCNC: 4.8 % — HIGH (ref 0–3.5)
CK MB CFR SERPL CALC: 1.4 NG/ML — SIGNIFICANT CHANGE UP (ref 0–3.6)
CK SERPL-CCNC: 29 U/L — SIGNIFICANT CHANGE UP (ref 26–308)
CO2 SERPL-SCNC: 33 MMOL/L — HIGH (ref 22–31)
CREAT SERPL-MCNC: 1.4 MG/DL — HIGH (ref 0.5–1.3)
GLUCOSE SERPL-MCNC: 93 MG/DL — SIGNIFICANT CHANGE UP (ref 70–99)
HBA1C BLD-MCNC: 5.1 % — SIGNIFICANT CHANGE UP (ref 4–5.6)
HCT VFR BLD CALC: 24.9 % — LOW (ref 39–50)
HGB BLD-MCNC: 7.9 G/DL — LOW (ref 13–17)
INR BLD: 1.27 RATIO — HIGH (ref 0.88–1.16)
LEGIONELLA AG UR QL: NEGATIVE — SIGNIFICANT CHANGE UP
MCHC RBC-ENTMCNC: 30.9 PG — SIGNIFICANT CHANGE UP (ref 27–34)
MCHC RBC-ENTMCNC: 31.7 GM/DL — LOW (ref 32–36)
MCV RBC AUTO: 97.3 FL — SIGNIFICANT CHANGE UP (ref 80–100)
NRBC # BLD: 0 /100 WBCS — SIGNIFICANT CHANGE UP (ref 0–0)
PLATELET # BLD AUTO: 196 K/UL — SIGNIFICANT CHANGE UP (ref 150–400)
POTASSIUM SERPL-MCNC: 4.5 MMOL/L — SIGNIFICANT CHANGE UP (ref 3.5–5.3)
POTASSIUM SERPL-SCNC: 4.5 MMOL/L — SIGNIFICANT CHANGE UP (ref 3.5–5.3)
PROT SERPL-MCNC: 6.6 G/DL — SIGNIFICANT CHANGE UP (ref 6–8.3)
PROTHROM AB SERPL-ACNC: 14.6 SEC — HIGH (ref 10–12.9)
RBC # BLD: 2.56 M/UL — LOW (ref 4.2–5.8)
RBC # FLD: 15.1 % — HIGH (ref 10.3–14.5)
SODIUM SERPL-SCNC: 135 MMOL/L — SIGNIFICANT CHANGE UP (ref 135–145)
TROPONIN I SERPL-MCNC: <.015 NG/ML — SIGNIFICANT CHANGE UP (ref 0.01–0.04)
VIT B12 SERPL-MCNC: 1014 PG/ML — SIGNIFICANT CHANGE UP (ref 232–1245)
WBC # BLD: 7.11 K/UL — SIGNIFICANT CHANGE UP (ref 3.8–10.5)
WBC # FLD AUTO: 7.11 K/UL — SIGNIFICANT CHANGE UP (ref 3.8–10.5)

## 2019-09-11 PROCEDURE — 93306 TTE W/DOPPLER COMPLETE: CPT | Mod: 26

## 2019-09-11 PROCEDURE — 99233 SBSQ HOSP IP/OBS HIGH 50: CPT

## 2019-09-11 PROCEDURE — 71045 X-RAY EXAM CHEST 1 VIEW: CPT | Mod: 26

## 2019-09-11 PROCEDURE — 99223 1ST HOSP IP/OBS HIGH 75: CPT

## 2019-09-11 RX ORDER — VANCOMYCIN HCL 1 G
1250 VIAL (EA) INTRAVENOUS EVERY 24 HOURS
Refills: 0 | Status: DISCONTINUED | OUTPATIENT
Start: 2019-09-11 | End: 2019-09-12

## 2019-09-11 RX ORDER — ASPIRIN/CALCIUM CARB/MAGNESIUM 324 MG
81 TABLET ORAL DAILY
Refills: 0 | Status: DISCONTINUED | OUTPATIENT
Start: 2019-09-11 | End: 2019-09-20

## 2019-09-11 RX ORDER — FUROSEMIDE 40 MG
40 TABLET ORAL EVERY 12 HOURS
Refills: 0 | Status: DISCONTINUED | OUTPATIENT
Start: 2019-09-11 | End: 2019-09-11

## 2019-09-11 RX ORDER — TRAMADOL HYDROCHLORIDE 50 MG/1
0.5 TABLET ORAL
Qty: 0 | Refills: 0 | DISCHARGE

## 2019-09-11 RX ORDER — IPRATROPIUM/ALBUTEROL SULFATE 18-103MCG
3 AEROSOL WITH ADAPTER (GRAM) INHALATION EVERY 6 HOURS
Refills: 0 | Status: DISCONTINUED | OUTPATIENT
Start: 2019-09-11 | End: 2019-09-20

## 2019-09-11 RX ORDER — FENOFIBRATE,MICRONIZED 130 MG
145 CAPSULE ORAL DAILY
Refills: 0 | Status: DISCONTINUED | OUTPATIENT
Start: 2019-09-11 | End: 2019-09-20

## 2019-09-11 RX ORDER — LANOLIN/MINERAL OIL
1 LOTION (ML) TOPICAL
Qty: 0 | Refills: 0 | DISCHARGE

## 2019-09-11 RX ORDER — VANCOMYCIN HCL 1 G
1000 VIAL (EA) INTRAVENOUS EVERY 12 HOURS
Refills: 0 | Status: DISCONTINUED | OUTPATIENT
Start: 2019-09-11 | End: 2019-09-11

## 2019-09-11 RX ORDER — GABAPENTIN 400 MG/1
300 CAPSULE ORAL
Refills: 0 | Status: DISCONTINUED | OUTPATIENT
Start: 2019-09-11 | End: 2019-09-11

## 2019-09-11 RX ORDER — ATORVASTATIN CALCIUM 80 MG/1
40 TABLET, FILM COATED ORAL AT BEDTIME
Refills: 0 | Status: DISCONTINUED | OUTPATIENT
Start: 2019-09-11 | End: 2019-09-20

## 2019-09-11 RX ORDER — EZETIMIBE 10 MG/1
1 TABLET ORAL
Qty: 0 | Refills: 0 | DISCHARGE

## 2019-09-11 RX ORDER — GABAPENTIN 400 MG/1
100 CAPSULE ORAL
Refills: 0 | Status: DISCONTINUED | OUTPATIENT
Start: 2019-09-11 | End: 2019-09-20

## 2019-09-11 RX ORDER — ATORVASTATIN CALCIUM 80 MG/1
1 TABLET, FILM COATED ORAL
Qty: 0 | Refills: 0 | DISCHARGE

## 2019-09-11 RX ORDER — PIPERACILLIN AND TAZOBACTAM 4; .5 G/20ML; G/20ML
3.38 INJECTION, POWDER, LYOPHILIZED, FOR SOLUTION INTRAVENOUS EVERY 8 HOURS
Refills: 0 | Status: DISCONTINUED | OUTPATIENT
Start: 2019-09-11 | End: 2019-09-13

## 2019-09-11 RX ORDER — CARVEDILOL PHOSPHATE 80 MG/1
18.75 CAPSULE, EXTENDED RELEASE ORAL EVERY 12 HOURS
Refills: 0 | Status: DISCONTINUED | OUTPATIENT
Start: 2019-09-11 | End: 2019-09-20

## 2019-09-11 RX ORDER — SACUBITRIL AND VALSARTAN 24; 26 MG/1; MG/1
1 TABLET, FILM COATED ORAL
Qty: 0 | Refills: 0 | DISCHARGE

## 2019-09-11 RX ORDER — FUROSEMIDE 40 MG
40 TABLET ORAL EVERY 12 HOURS
Refills: 0 | Status: DISCONTINUED | OUTPATIENT
Start: 2019-09-11 | End: 2019-09-13

## 2019-09-11 RX ORDER — LANOLIN/MINERAL OIL
1 LOTION (ML) TOPICAL DAILY
Refills: 0 | Status: DISCONTINUED | OUTPATIENT
Start: 2019-09-11 | End: 2019-09-20

## 2019-09-11 RX ORDER — RIVAROXABAN 15 MG-20MG
15 KIT ORAL
Refills: 0 | Status: DISCONTINUED | OUTPATIENT
Start: 2019-09-11 | End: 2019-09-20

## 2019-09-11 RX ADMIN — Medication 166.67 MILLIGRAM(S): at 22:19

## 2019-09-11 RX ADMIN — Medication 1 APPLICATION(S): at 17:57

## 2019-09-11 RX ADMIN — Medication 81 MILLIGRAM(S): at 12:49

## 2019-09-11 RX ADMIN — GABAPENTIN 100 MILLIGRAM(S): 400 CAPSULE ORAL at 17:56

## 2019-09-11 RX ADMIN — Medication 3 MILLILITER(S): at 07:17

## 2019-09-11 RX ADMIN — PIPERACILLIN AND TAZOBACTAM 25 GRAM(S): 4; .5 INJECTION, POWDER, LYOPHILIZED, FOR SOLUTION INTRAVENOUS at 17:53

## 2019-09-11 RX ADMIN — Medication 3 MILLILITER(S): at 14:13

## 2019-09-11 RX ADMIN — ATORVASTATIN CALCIUM 40 MILLIGRAM(S): 80 TABLET, FILM COATED ORAL at 22:19

## 2019-09-11 RX ADMIN — GABAPENTIN 300 MILLIGRAM(S): 400 CAPSULE ORAL at 06:02

## 2019-09-11 RX ADMIN — Medication 145 MILLIGRAM(S): at 12:49

## 2019-09-11 RX ADMIN — PIPERACILLIN AND TAZOBACTAM 25 GRAM(S): 4; .5 INJECTION, POWDER, LYOPHILIZED, FOR SOLUTION INTRAVENOUS at 06:02

## 2019-09-11 RX ADMIN — Medication 40 MILLIGRAM(S): at 17:53

## 2019-09-11 RX ADMIN — Medication 40 MILLIGRAM(S): at 06:02

## 2019-09-11 RX ADMIN — CARVEDILOL PHOSPHATE 18.75 MILLIGRAM(S): 80 CAPSULE, EXTENDED RELEASE ORAL at 17:53

## 2019-09-11 RX ADMIN — RIVAROXABAN 15 MILLIGRAM(S): KIT at 17:58

## 2019-09-11 RX ADMIN — Medication 3 MILLILITER(S): at 20:28

## 2019-09-11 NOTE — GOALS OF CARE CONVERSATION - PERSONAL ADVANCE DIRECTIVE - NS PRO AD PATIENT TYPE
Health Care Proxy (HCP) Medical Orders for Life-Sustaining Treatment (MOLST)/Health Care Proxy (HCP)

## 2019-09-11 NOTE — CONSULT NOTE ADULT - SUBJECTIVE AND OBJECTIVE BOX
[facility]         Claudio Gilliland, Maryanne, Shavon, Cuong, Eduardo, Selene        [phone] (office)    CHIEF COMPLAINT: Patient is a 85y old  Male who presents with a chief complaint of LLE cellulitis, PNA (10 Sep 2019 22:30)      HPI:  85yo M with PMH with CHF (LVEF 30% in Aug 2016) with AICD, CAD, MI s/p CABG x3 (2004), A-fib on Xarelto, prostate CA s/p radiation (2004), colon CA s/p chemo (2004), 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, HTN, HLD, COPD on home O2 (2L at night), iron def anemia s/p 3 transfusions? 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 Pt also admits to wheelchair induced injury to the left LE resulting in 10 stiches on 9/4/2019 now pt stating area is cellulitic but it has not been treated outpatient. Per pt, [facility] which resulted in worsening b/l LE edema. Pt also admits to diarrhea x5 days- non bloody, no abdominal pain, nausea or vomiting. Pt denies recent travel or sick contacts. Pt denies dizziness, headache, TRUJILLO, chest pain, palpitations, worsening peripheral neuropathy.     In the ED, VS T=98.2F, HR 61, /73, RR 16, SpO2 96% 2L NC.   Labs significant for H/H 8/25.4, Na+ 132, proBNP 8707  UA = lg amount of blood, mod leuk esterase   EKG: Sinus rhythm w/ 1st degree AV block, LAD, LBBB - changed from last available Aug 2018  CXR: appears mostly unchanged from prior with chronic changes- will f/u formal read.       PAST MEDICAL & SURGICAL HISTORY:  Oxygen dependent: wears 2LNC at   Ischemic bowel disease  Colon cancer: s/p chemo  Automatic implantable cardioverter-defibrillator in situ  Acute MI: s/p CABG x3  Hypertension  Congestive heart failure  Insomnia  Anemia, vitamin B12 deficiency  HTN (hypertension)  Ischemic colitis, enteritis, or enterocolitis: s/p partial colectomy  Vitamin B 12 deficiency  Prostate cancer: s/p radiation  Afib  CKD (chronic kidney disease)  MI (myocardial infarction)  PAD (peripheral artery disease): s/p stent, fem-fem bypass  CAD (coronary artery disease)  Peripheral Neuropathy  COPD (chronic obstructive pulmonary disease)  Colostomy care  Cataract extraction status of eye, right  S/P colostomy  S/P cardiac pacemaker procedure: AICD  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  S/P small bowel resection  S/P colon resection  S/P cholecystectomy  S/P bypass graft of extremity: Left to right femoral-femoral artery bypass graft 10 years ago  S/P CABG x 3      SOCIAL HISTORY: No active tobacco, alcohol or illicit drug use.    FAMILY HISTORY:  FH: prostate cancer  FH: HTN (hypertension)  Family history of colon cancer  Family history of MI (myocardial infarction)      Home Medications:  Aquaphor topical ointment: Apply topically to affected area once a day (11 Sep 2019 00:49)  Aspirin Enteric Coated 81 mg oral delayed release tablet: 1 tab(s) orally once a day (11 Sep 2019 00:49)  atorvastatin 80 mg oral tablet: 1 tab(s) orally once a day (11 Sep 2019 00:49)  carvedilol 12.5 mg oral tablet: 1.5 tab(s) orally every 12 hours (11 Sep 2019 00:49)  Entresto 24 mg-26 mg oral tablet: 1 tab(s) orally 2 times a day (11 Sep 2019 00:49)  fenofibrate 145 mg oral tablet: 1 tab(s) orally once a day (11 Sep 2019 00:49)  gabapentin 100 mg oral capsule: 3 cap(s) orally 2 times a day (11 Sep 2019 00:49)  mometasone 220 mcg/inh inhalation aerosol powder: 1 puff(s) inhaled once a day (in the evening) (11 Sep 2019 00:49)  tramadol 50 mg oral tablet: 0.5 tab(s) orally once a day, As Needed (11 Sep 2019 00:49)  Xarelto 15 mg oral tablet: 1 tab(s) orally once a day (in the evening) (11 Sep 2019 00:49)  Zetia 10 mg oral tablet: 1 tab(s) orally once a day (11 Sep 2019 00:49)      MEDICATIONS  (STANDING):  ALBUTerol/ipratropium for Nebulization 3 milliLiter(s) Nebulizer every 6 hours  aspirin enteric coated 81 milliGRAM(s) Oral daily  carvedilol 18.75 milliGRAM(s) Oral every 12 hours  fenofibrate Tablet 145 milliGRAM(s) Oral daily  furosemide   Injectable 40 milliGRAM(s) IV Push every 12 hours  gabapentin 100 milliGRAM(s) Oral two times a day  influenza   Vaccine 0.5 milliLiter(s) IntraMuscular once  mineral oil/petrolatum Hydrophilic Ointment 1 Application(s) Topical daily  piperacillin/tazobactam IVPB.. 3.375 Gram(s) IV Intermittent every 8 hours  rivaroxaban 15 milliGRAM(s) Oral with dinner  vancomycin  IVPB 1250 milliGRAM(s) IV Intermittent every 24 hours    MEDICATIONS  (PRN):      Allergies    No Known Allergies    Intolerances        REVIEW OF SYSTEMS: Is negative for eye, ENT, GI, , allergic, dermatologic, musculoskeletal and neurologic, except as described above.    VITAL SIGNS:   Vital Signs Last 24 Hrs  T(C): 36.7 (11 Sep 2019 05:45), Max: 36.8 (10 Sep 2019 19:20)  T(F): 98.1 (11 Sep 2019 05:45), Max: 98.2 (10 Sep 2019 19:20)  HR: 59 (11 Sep 2019 05:45) (59 - 75)  BP: 126/70 (11 Sep 2019 05:45) (115/58 - 126/70)  BP(mean): --  RR: 16 (11 Sep 2019 05:45) (15 - 18)  SpO2: 96% (11 Sep 2019 05:45) (96% - 97%)    I&O's Summary    10 Sep 2019 07:01  -  11 Sep 2019 07:00  --------------------------------------------------------  IN: 0 mL / OUT: 1175 mL / NET: -1175 mL        PHYSICAL EXAM:  Constitutional: NAD, awake and alert, well-developed  Eyes: EOMI, no oral cyanosis, conjunctivae clear, anicteric  Pulmonary: Non-labored, + expiratory wheezes, mild lower bilateral lower lobe crackles   Cardiovascular: regular S1 and S2, normal rate. + systolic murmur on 4 ICS L sternal border   Gastrointestinal: Bowel sounds present, soft, nontender  Lymph: + 1 BL lower extremity edema   Neurological: Alert, strength and sensitivity are grossly intact  Skin: Warm, well-perfused  Psych: Mood and affect appropriate    LABS: All Labs Reviewed:                        8.0    8.16  )-----------( 192      ( 10 Sep 2019 19:29 )             25.4     11 Sep 2019 04:32    135    |  97     |  26     ----------------------------<  93     4.5     |  33     |  1.40   10 Sep 2019 19:29    132    |  96     |  30     ----------------------------<  109    4.8     |  28     |  1.50     Ca    9.1        11 Sep 2019 04:32  Ca    9.3        10 Sep 2019 19:29    TPro  6.6    /  Alb  3.1    /  TBili  0.5    /  DBili  x      /  AST  16     /  ALT  14     /  AlkPhos  37     11 Sep 2019 04:32  TPro  6.6    /  Alb  3.2    /  TBili  0.3    /  DBili  x      /  AST  22     /  ALT  17     /  AlkPhos  38     10 Sep 2019 19:29    PT/INR - ( 11 Sep 2019 04:32 )   PT: 14.6 sec;   INR: 1.27 ratio           CARDIAC MARKERS ( 11 Sep 2019 08:31 )  <.015 ng/mL / x     / x     / x     / x      CARDIAC MARKERS ( 11 Sep 2019 04:32 )  <.015 ng/mL / x     / x     / x     / x      CARDIAC MARKERS ( 11 Sep 2019 01:18 )  <.015 ng/mL / x     / 29 U/L / x     / 1.4 ng/mL      Blood Culture:   09-10 @ 19:29  Pro Bnp 8707        RADIOLOGY:    EKG: Sinus rhythm with 1st AV block   Left axis deviation   LBBB

## 2019-09-11 NOTE — CONSULT NOTE ADULT - ASSESSMENT
85 yo M M with PMH with CHF (LVEF 30% in Aug 2016) with AICD, CAD, MI s/p CABG x3 (2004), A-fib on Xarelto, prostate CA s/p radiation (2004), colon CA s/p chemo (2004), 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, HTN, HLD, COPD on home O2 (2L at night), iron def anemia s/p 3 transfusions? 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 Patient also presenting with bilateral lower extremity edema and TRUJILLO. At home takes a tailored dose of torsemide (10-20mg) depending on his weight. Admits of >2lb increase.     - Patient is not complaining of any cardiac symptoms at this time.  - No clear evidence of acute ischemia, trops negative x 3.   - EKG shows 1st degree AV block, left axis deviation and LBBB which is a new finding when compared to previous EKG.   - Bilateral lower extremity edema and mild lower lobe crackles concerning for volume overload likely 2/2 Acute on chronic CHF.   - Previous TTE (2016) revealed EF 30%.   - F/u TTE to assess for valvular or any structural cardiac abnormality.    - BP well controlled, monitor routine hemodynamics.  - Continue furosemide 40 IV q 12 for acute on chronic CHF.  - Continue ASA, carvedilol 18.75 mg q12, fenofibrate 145 mg for CAD    - Monitor and replete lytes, keep K>4, Mg>2.  - Strict I/Os, daily weights.   - Other cardiovascular workup will depend on clinical course.  - All other workup per primary team.  - Will continue to follow. 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 Patient also presenting with bilateral lower extremity edema and TRUJILLO. At home takes a tailored dose of torsemide (10-20mg) depending on his weight. Admits of >2lb increase.     - Patient is not complaining of any cardiac symptoms at this time.  - No clear evidence of acute ischemia, trops negative x 3.   - EKG shows 1st degree AV block, left axis deviation and LBBB which is a new finding when compared to previous EKG.   - Bilateral lower extremity edema and mild lower lobe crackles concerning for volume overload likely 2/2 Acute on chronic CHF.   - Previous TTE (2016) revealed EF 30%.   - F/u TTE to assess for valvular or any structural cardiac abnormality.    - BP well controlled, monitor routine hemodynamics.  - Continue furosemide 40 IV q 12 for acute on chronic CHF.  - Continue ASA, carvedilol 18.75 mg q12, fenofibrate 145 mg for CAD    - Reccommend to continue home dose on Entretod  - Monitor and replete lytes, keep K>4, Mg>2.  - Strict I/Os, daily weights.   - Other cardiovascular workup will depend on clinical course.  - All other workup per primary team.  - Will continue to follow. 85 yo M M with PMH with CHF (LVEF 30% in Aug 2016) with AICD, CAD, MI s/p CABG x3 (2004), A-fib on Xarelto, prostate CA s/p radiation (2004), colon CA s/p chemo (2004), 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, HTN, HLD, COPD on home O2 (2L at night), iron def anemia s/p 3 transfusions? at 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 Patient also presenting with bilateral lower extremity edema and TRUJILLO. At home takes a tailored dose of torsemide (10-20mg) depending on his weight. Admits of >2lb increase.     - Patient is not complaining of any cardiac symptoms at this time.  - No clear evidence of acute ischemia, trops negative x 3.   - EKG shows 1st degree AV block, left axis deviation and LBBB which is a new finding when compared to previous EKG.   - Bilateral lower extremity edema and mild lower lobe crackles concerning for volume overload likely 2/2 Acute on chronic CHF.   - Previous TTE (2016) revealed EF 30%.   - F/u TTE to assess for valvular or any structural cardiac abnormality.    - BP well controlled, monitor routine hemodynamics.  - Continue furosemide 40 IV q 12 for acute on chronic CHF.  - Continue ASA, carvedilol 18.75 mg q12, fenofibrate 145 mg for CAD    - Recommend to continue home dose on Entreto  - Monitor and replete lytes, keep K>4, Mg>2.  - Strict I/Os, daily weights.   - Other cardiovascular workup will depend on clinical course.  - All other workup per primary team.  - Will continue to follow. 85 yo M M with PMH with CHF (LVEF 30% in Aug 2016) with AICD, CAD, MI s/p CABG x3 (2004), A-fib on Xarelto, prostate CA s/p radiation (2004), colon CA s/p chemo (2004), 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, HTN, HLD, COPD on home O2 (2L at night), iron def anemia s/p 3 transfusions? 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 Patient also presenting with bilateral lower extremity edema and TRUJILLO. At home takes a tailored dose of torsemide (10-20mg) depending on his weight. Admits of >2lb increase.     - No clear evidence of acute ischemia, trops negative x 3.   - EKG shows 1st degree AV block, left axis deviation and LBBB which is a new finding when compared to previous EKG.   - Bilateral lower extremity edema and mild lower lobe crackles concerning for volume overload likely 2/2 Acute on chronic systolic CHF.   - Previous TTE (2016) revealed EF 30%.   - F/u TTE to assess for valvular or any structural cardiac abnormality.    - BP well controlled, monitor routine hemodynamics.  - Continue furosemide 40 IV q 12 for acute on chronic CHF.  - Continue ASA, carvedilol 18.75 mg q12, fenofibrate 145 mg for CAD    - Recommend to continue home dose on Entresto.  Please resume  - Resume statin drug  - Monitor and replete lytes, keep K>4, Mg>2.  - Strict I/Os, daily weights.   - Other cardiovascular workup will depend on clinical course.  - All other workup per primary team.  - Will continue to follow.

## 2019-09-11 NOTE — PROGRESS NOTE ADULT - PROBLEM SELECTOR PLAN 9
Chronic, s/p CABG x3 2004  -Continue home ASA 81mg PO qd  -Continue home statin for HLD  - c/w entresto   -Continue fenofibrate 145mg PO qd for HLD

## 2019-09-11 NOTE — PROGRESS NOTE ADULT - PROBLEM SELECTOR PLAN 4
- LBBB noted on EKG. not on prior EKG 2018; no other signs of acute ischemia  - no active CP, SOB   - CE negative x 3  - cardio recs appreciated

## 2019-09-11 NOTE — PROGRESS NOTE ADULT - PROBLEM SELECTOR PLAN 5
Chronic   -Pt received transfusions x3 08/2019 which resulted in worsening b/l LE edema   -Monitor H/H , type and screen  -Transfuse PRN  -F/u B12 levels

## 2019-09-11 NOTE — PROGRESS NOTE ADULT - PROBLEM SELECTOR PLAN 2
Acute, s/p traumatic injury requiring 10 sutures   -Left medial LE warm, tender and erythematous w/ central eschar & known vascular disease on this leg  -Start IV Zosyn and IV vanco   -Wound care consulted  -Follow up blood cultures  -Follow up AM labs

## 2019-09-11 NOTE — PROGRESS NOTE ADULT - ASSESSMENT
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 Admit for cellulitis and decompensated heart failure.

## 2019-09-11 NOTE — PROGRESS NOTE ADULT - PROBLEM SELECTOR PLAN 1
- p/w generalized weakness. likely multifactorial. acute infection-cellulitis- decompensated heart failure and diarrhea   - Legionella negative: CXR shows increased congestion:  - cellulitis of lower extremity, will start IV abx. f/u blood cultures, trend temp/leukocytosis, am labs.  - diarrhea - pt with recent abx use. will check stool for c diff. stool pcr. hold off on ivf for now given pt appears hypervolemic. will monitor lytes/renal function and volume status closely.   - PT eval

## 2019-09-11 NOTE — PROGRESS NOTE ADULT - PROBLEM SELECTOR PLAN 8
Chronic  -Pt @ baseline Cr 1.5  -Monitor BMP and GFR in setting of vanco dosing and Xarelto   -Avoid nephrotoxic drugs

## 2019-09-11 NOTE — GOALS OF CARE CONVERSATION - PERSONAL ADVANCE DIRECTIVE - CONVERSATION DETAILS
Health care proxy was reviewed with patient and daughter at bedside, no changes are to be made at this time.Proxy was placed on chart. Health care proxy was reviewed with patient and daughter at bedside, no changes are to be made at this time.Proxy was placed on chart.    addendum: 9/13/19: 1630hrs: met pt wife, daughter, Tanika, pt with resp distress, placed on bipap, further discussion regarding intubation with Dr Ziegler. wife agrees no intubation for pt, wants treatment: bipap, hope will help pt.  molst reviewed: wife agrees to dnr dni no TF, wants treatment, IV fluids & antibiotics . daughter supports wife decisions. support for wife. Dr Ziegler completed molst form. Health care proxy was reviewed with patient and daughter at bedside, no changes are to be made at this time.Proxy was placed on chart.    addendum: 9/13/19: 1630hrs: met pt wife, daughter, Tanika, pt with resp distress, placed on bipap, further discussion regarding intubation with Dr Ziegler. wife agrees no intubation for pt, wants treatment: bipap, hope will help pt.  molst reviewed: wife agrees to dnr dni no TF, wants treatment, IV fluids & antibiotics . daughter supports wife decisions. support for wife. Dr Ziegler completed molst form.  9/20/19 3:10 pm  Family requested pt go on comfort measures and have a hospice eval. Dr Benites notified, will write pall orders

## 2019-09-11 NOTE — PROGRESS NOTE ADULT - PROBLEM SELECTOR PLAN 3
Acute on chronic systolic CHF exacerbation   -BNP 8707  -Last known Echo 2016, EF 30%  -Echo ordered, f/u results  -Continue Lasix 40mg IV BID   - Cardio, Dr. Hutchins, consulted recs appreciated  -Strict I&Os, daily weights, HOB elevated

## 2019-09-11 NOTE — PROGRESS NOTE ADULT - SUBJECTIVE AND OBJECTIVE BOX
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 Pt also admits to wheelchair induced injury to the left LE resulting in 10 stiches on 9/4/2019 now pt stating area is cellulitic but it has not been treated outpatient.    INTERVAL EVENTS:  Patient reports that he continues to feel weak w/ SOB and leg swelling.  He denies fever/chills overnight.    REVIEW OF SYSTEMS:    CONSTITUTIONAL: + weakness; No fevers or chills  EYES/ENT: No visual changes, no throat pain   RESPIRATORY: + SOB;  cough, wheezing, hemoptysis;  CARDIOVASCULAR: No chest pain or palpitations  GASTROINTESTINAL: No abdominal pain, nausea, vomiting, or hematemesis; No diarrhea or constipation. No melena or hematochezia.  GENITOURINARY: No dysuria, frequency or hematuria  NEUROLOGICAL: No dizziness, numbness, or weakness  SKIN: + skin injury on LLE;    All other review of systems is negative unless indicated above.    VITAL SIGNS:  T(C): 36.6 (09-11-19 @ 13:08), Max: 36.8 (09-10-19 @ 19:20)  HR: 60 (09-11-19 @ 14:14) (59 - 75)  BP: 99/54 (09-11-19 @ 13:08) (99/54 - 126/70)  RR: 17 (09-11-19 @ 13:08) (15 - 18)  SpO2: 96% (09-11-19 @ 14:14) (96% - 97%)    PHYSICAL EXAM:     GENERAL: no acute distress  HEENT: NC/AT, EOMI, neck supple, MMM  RESPIRATORY: LCTAB/L, no rhonchi, rales, or wheezing  CARDIOVASCULAR: RRR, no murmurs, gallops, rubs  ABDOMINAL: soft, non-tender, non-distended, positive bowel sounds   EXTREMITIES: +1 pitting bilaterally: LLE wound w/ 3x5cm eschar and surrounding erythema  NEUROLOGICAL: alert and oriented x 3, non-focal  MUSCULOSKELETAL: no gross joint deformity                          8.0    8.16  )-----------( 192      ( 10 Sep 2019 19:29 )             25.4     09-11    135  |  97  |  26<H>  ----------------------------<  93  4.5   |  33<H>  |  1.40<H>    Ca    9.1      11 Sep 2019 04:32    TPro  6.6  /  Alb  3.1<L>  /  TBili  0.5  /  DBili  x   /  AST  16  /  ALT  14  /  AlkPhos  37<L>  09-11      CAPILLARY BLOOD GLUCOSE          MEDICATIONS  (STANDING):  ALBUTerol/ipratropium for Nebulization 3 milliLiter(s) Nebulizer every 6 hours  aspirin enteric coated 81 milliGRAM(s) Oral daily  carvedilol 18.75 milliGRAM(s) Oral every 12 hours  fenofibrate Tablet 145 milliGRAM(s) Oral daily  furosemide   Injectable 40 milliGRAM(s) IV Push every 12 hours  gabapentin 100 milliGRAM(s) Oral two times a day  influenza   Vaccine 0.5 milliLiter(s) IntraMuscular once  mineral oil/petrolatum Hydrophilic Ointment 1 Application(s) Topical daily  piperacillin/tazobactam IVPB.. 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 Pt also admits to wheelchair induced injury to the left LE resulting in 10 stiches on 9/4/2019 now pt stating area is cellulitic but it has not been treated outpatient.    INTERVAL EVENTS:  Patient reports that he continues to feel weak w/ SOB and leg swelling.  He denies fever/chills overnight.    REVIEW OF SYSTEMS:    CONSTITUTIONAL: + weakness; No fevers or chills  EYES/ENT: No visual changes, no throat pain   RESPIRATORY: + SOB;  cough, wheezing, hemoptysis;  CARDIOVASCULAR: No chest pain or palpitations  GASTROINTESTINAL: No abdominal pain, nausea, vomiting, or hematemesis; No diarrhea or constipation. No melena or hematochezia.  GENITOURINARY: No dysuria, frequency or hematuria  NEUROLOGICAL: No dizziness, numbness, or weakness  SKIN: + skin injury on LLE;    All other review of systems is negative unless indicated above.    VITAL SIGNS:  T(C): 36.6 (09-11-19 @ 13:08), Max: 36.8 (09-10-19 @ 19:20)  HR: 60 (09-11-19 @ 14:14) (59 - 75)  BP: 99/54 (09-11-19 @ 13:08) (99/54 - 126/70)  RR: 17 (09-11-19 @ 13:08) (15 - 18)  SpO2: 96% (09-11-19 @ 14:14) (96% - 97%)    PHYSICAL EXAM:     GENERAL: no acute distress  HEENT: NC/AT, EOMI, neck supple, MMM  RESPIRATORY: crackles at lung bases bilaterally	  CARDIOVASCULAR: RRR, no murmurs, gallops, rubs  ABDOMINAL: soft, non-tender, non-distended, positive bowel sounds   EXTREMITIES: +1 pitting bilaterally: LLE wound w/ 3x5cm eschar and surrounding erythema  NEUROLOGICAL: alert and oriented x 3, non-focal  MUSCULOSKELETAL: no gross joint deformity                          8.0    8.16  )-----------( 192      ( 10 Sep 2019 19:29 )             25.4     09-11    135  |  97  |  26<H>  ----------------------------<  93  4.5   |  33<H>  |  1.40<H>    Ca    9.1      11 Sep 2019 04:32    TPro  6.6  /  Alb  3.1<L>  /  TBili  0.5  /  DBili  x   /  AST  16  /  ALT  14  /  AlkPhos  37<L>  09-11      CAPILLARY BLOOD GLUCOSE          MEDICATIONS  (STANDING):  ALBUTerol/ipratropium for Nebulization 3 milliLiter(s) Nebulizer every 6 hours  aspirin enteric coated 81 milliGRAM(s) Oral daily  carvedilol 18.75 milliGRAM(s) Oral every 12 hours  fenofibrate Tablet 145 milliGRAM(s) Oral daily  furosemide   Injectable 40 milliGRAM(s) IV Push every 12 hours  gabapentin 100 milliGRAM(s) Oral two times a day  influenza   Vaccine 0.5 milliLiter(s) IntraMuscular once  mineral oil/petrolatum Hydrophilic Ointment 1 Application(s) Topical daily  piperacillin/tazobactam IVPB.. 3.375 Gram(s) IV Intermittent every 8 hours  rivaroxaban 15 milliGRAM(s) Oral with dinner  vancomycin  IVPB 1250 milliGRAM(s) IV Intermittent every 24 hours

## 2019-09-11 NOTE — GOALS OF CARE CONVERSATION - PERSONAL ADVANCE DIRECTIVE - TREATMENT GUIDELINES
IV fluid trial/DNI/DNR Order/No artificial nutrition/Antibiotic trial No blood draws/No artificial nutrition/Comfort measures only/No antibiotics/IV fluid trial/DNI/DNR Order/Do not re-hospitalize

## 2019-09-12 DIAGNOSIS — R41.0 DISORIENTATION, UNSPECIFIED: ICD-10-CM

## 2019-09-12 LAB
-  COAGULASE NEGATIVE STAPHYLOCOCCUS: SIGNIFICANT CHANGE UP
ALBUMIN SERPL ELPH-MCNC: 3.1 G/DL — LOW (ref 3.3–5)
ALP SERPL-CCNC: 38 U/L — LOW (ref 40–120)
ALT FLD-CCNC: 13 U/L — SIGNIFICANT CHANGE UP (ref 12–78)
ANION GAP SERPL CALC-SCNC: 6 MMOL/L — SIGNIFICANT CHANGE UP (ref 5–17)
AST SERPL-CCNC: 15 U/L — SIGNIFICANT CHANGE UP (ref 15–37)
BASOPHILS # BLD AUTO: 0.04 K/UL — SIGNIFICANT CHANGE UP (ref 0–0.2)
BASOPHILS NFR BLD AUTO: 0.5 % — SIGNIFICANT CHANGE UP (ref 0–2)
BILIRUB SERPL-MCNC: 0.6 MG/DL — SIGNIFICANT CHANGE UP (ref 0.2–1.2)
BUN SERPL-MCNC: 29 MG/DL — HIGH (ref 7–23)
C DIFF BY PCR RESULT: SIGNIFICANT CHANGE UP
C DIFF TOX GENS STL QL NAA+PROBE: SIGNIFICANT CHANGE UP
CALCIUM SERPL-MCNC: 9.8 MG/DL — SIGNIFICANT CHANGE UP (ref 8.5–10.1)
CHLORIDE SERPL-SCNC: 93 MMOL/L — LOW (ref 96–108)
CO2 SERPL-SCNC: 33 MMOL/L — HIGH (ref 22–31)
CREAT SERPL-MCNC: 1.5 MG/DL — HIGH (ref 0.5–1.3)
CULTURE RESULTS: SIGNIFICANT CHANGE UP
EOSINOPHIL # BLD AUTO: 0.23 K/UL — SIGNIFICANT CHANGE UP (ref 0–0.5)
EOSINOPHIL NFR BLD AUTO: 2.9 % — SIGNIFICANT CHANGE UP (ref 0–6)
GLUCOSE SERPL-MCNC: 123 MG/DL — HIGH (ref 70–99)
GRAM STN FLD: SIGNIFICANT CHANGE UP
GRAM STN FLD: SIGNIFICANT CHANGE UP
HCT VFR BLD CALC: 26.5 % — LOW (ref 39–50)
HGB BLD-MCNC: 8.4 G/DL — LOW (ref 13–17)
IMM GRANULOCYTES NFR BLD AUTO: 0.3 % — SIGNIFICANT CHANGE UP (ref 0–1.5)
LYMPHOCYTES # BLD AUTO: 0.59 K/UL — LOW (ref 1–3.3)
LYMPHOCYTES # BLD AUTO: 7.4 % — LOW (ref 13–44)
MAGNESIUM SERPL-MCNC: 1.8 MG/DL — SIGNIFICANT CHANGE UP (ref 1.6–2.6)
MCHC RBC-ENTMCNC: 30.7 PG — SIGNIFICANT CHANGE UP (ref 27–34)
MCHC RBC-ENTMCNC: 31.7 GM/DL — LOW (ref 32–36)
MCV RBC AUTO: 96.7 FL — SIGNIFICANT CHANGE UP (ref 80–100)
METHOD TYPE: SIGNIFICANT CHANGE UP
MONOCYTES # BLD AUTO: 1.06 K/UL — HIGH (ref 0–0.9)
MONOCYTES NFR BLD AUTO: 13.3 % — SIGNIFICANT CHANGE UP (ref 2–14)
NEUTROPHILS # BLD AUTO: 6.05 K/UL — SIGNIFICANT CHANGE UP (ref 1.8–7.4)
NEUTROPHILS NFR BLD AUTO: 75.6 % — SIGNIFICANT CHANGE UP (ref 43–77)
NRBC # BLD: 0 /100 WBCS — SIGNIFICANT CHANGE UP (ref 0–0)
PHOSPHATE SERPL-MCNC: 3.2 MG/DL — SIGNIFICANT CHANGE UP (ref 2.5–4.5)
PLATELET # BLD AUTO: 219 K/UL — SIGNIFICANT CHANGE UP (ref 150–400)
POTASSIUM SERPL-MCNC: 4.2 MMOL/L — SIGNIFICANT CHANGE UP (ref 3.5–5.3)
POTASSIUM SERPL-SCNC: 4.2 MMOL/L — SIGNIFICANT CHANGE UP (ref 3.5–5.3)
PROT SERPL-MCNC: 6.9 G/DL — SIGNIFICANT CHANGE UP (ref 6–8.3)
RBC # BLD: 2.74 M/UL — LOW (ref 4.2–5.8)
RBC # FLD: 15.3 % — HIGH (ref 10.3–14.5)
S PNEUM AG UR QL: NEGATIVE — SIGNIFICANT CHANGE UP
SODIUM SERPL-SCNC: 132 MMOL/L — LOW (ref 135–145)
SPECIMEN SOURCE: SIGNIFICANT CHANGE UP
WBC # BLD: 7.99 K/UL — SIGNIFICANT CHANGE UP (ref 3.8–10.5)
WBC # FLD AUTO: 7.99 K/UL — SIGNIFICANT CHANGE UP (ref 3.8–10.5)

## 2019-09-12 PROCEDURE — 99232 SBSQ HOSP IP/OBS MODERATE 35: CPT

## 2019-09-12 PROCEDURE — 99233 SBSQ HOSP IP/OBS HIGH 50: CPT

## 2019-09-12 RX ORDER — IPRATROPIUM/ALBUTEROL SULFATE 18-103MCG
3 AEROSOL WITH ADAPTER (GRAM) INHALATION ONCE
Refills: 0 | Status: COMPLETED | OUTPATIENT
Start: 2019-09-12 | End: 2019-09-12

## 2019-09-12 RX ORDER — FUROSEMIDE 40 MG
40 TABLET ORAL ONCE
Refills: 0 | Status: COMPLETED | OUTPATIENT
Start: 2019-09-12 | End: 2019-09-12

## 2019-09-12 RX ADMIN — Medication 40 MILLIGRAM(S): at 17:34

## 2019-09-12 RX ADMIN — PIPERACILLIN AND TAZOBACTAM 25 GRAM(S): 4; .5 INJECTION, POWDER, LYOPHILIZED, FOR SOLUTION INTRAVENOUS at 08:01

## 2019-09-12 RX ADMIN — PIPERACILLIN AND TAZOBACTAM 25 GRAM(S): 4; .5 INJECTION, POWDER, LYOPHILIZED, FOR SOLUTION INTRAVENOUS at 14:40

## 2019-09-12 RX ADMIN — Medication 3 MILLILITER(S): at 08:00

## 2019-09-12 RX ADMIN — Medication 3 MILLILITER(S): at 14:31

## 2019-09-12 RX ADMIN — Medication 145 MILLIGRAM(S): at 12:03

## 2019-09-12 RX ADMIN — RIVAROXABAN 15 MILLIGRAM(S): KIT at 17:33

## 2019-09-12 RX ADMIN — ATORVASTATIN CALCIUM 40 MILLIGRAM(S): 80 TABLET, FILM COATED ORAL at 21:18

## 2019-09-12 RX ADMIN — Medication 3 MILLILITER(S): at 01:45

## 2019-09-12 RX ADMIN — PIPERACILLIN AND TAZOBACTAM 25 GRAM(S): 4; .5 INJECTION, POWDER, LYOPHILIZED, FOR SOLUTION INTRAVENOUS at 21:19

## 2019-09-12 RX ADMIN — CARVEDILOL PHOSPHATE 18.75 MILLIGRAM(S): 80 CAPSULE, EXTENDED RELEASE ORAL at 17:34

## 2019-09-12 RX ADMIN — Medication 81 MILLIGRAM(S): at 12:04

## 2019-09-12 RX ADMIN — GABAPENTIN 100 MILLIGRAM(S): 400 CAPSULE ORAL at 17:33

## 2019-09-12 RX ADMIN — Medication 1 APPLICATION(S): at 12:11

## 2019-09-12 RX ADMIN — Medication 3 MILLILITER(S): at 19:22

## 2019-09-12 RX ADMIN — Medication 40 MILLIGRAM(S): at 06:45

## 2019-09-12 RX ADMIN — PIPERACILLIN AND TAZOBACTAM 25 GRAM(S): 4; .5 INJECTION, POWDER, LYOPHILIZED, FOR SOLUTION INTRAVENOUS at 00:59

## 2019-09-12 RX ADMIN — GABAPENTIN 100 MILLIGRAM(S): 400 CAPSULE ORAL at 05:19

## 2019-09-12 NOTE — CONSULT NOTE ADULT - PROBLEM SELECTOR RECOMMENDATION 3
meds dosed for renal function.    Thank you for consulting us and involving us in the management of this most interesting and challenging case.     Please Call with any further questions

## 2019-09-12 NOTE — CONSULT NOTE ADULT - SUBJECTIVE AND OBJECTIVE BOX
Patient was examined Chart reviewed Detailed note will be inserted below after going over latest data Meanwhile please refer to my previous notes for Pulmonary/Critical Care assessment recommendations EMIGDIO REEVES [facility] [phone]   1934 DOA 9/10/2019 DR HOLLY ZIEGLER   ALLERGY     nka    CONTACT     sp Esperanza RODRIGUEZ       Initial evaluation/Pulmonary Critical Care consultation requested on 2019   by Dr Ziegler  from Dr Singh   Patient examined chart reviewed    HOSPITAL ADMISSION   PATIENT CAME  FROM (if information available)      PATIENT EMIGDIO REEVES [facility] [phone]   1934 DOA 9/10/2019 DR HOLLY ZIEGLER     VITALS/LABS       2019 afeb 61 102/69 15 92%   W 7.9 Hb 8.4 Plt 219 Na 132 K 4.2 CO2 33 Cr 1.5    REVIEW OF SYMPTOMS     NOTE Noteworthy changes  if any  in ROS and PE are also entered  in note  below      Able to give ROS  Yes     RELIABLE No   CONSTITUTIONAL Weakness Yes  Chills No Vision changes No  ENDOCRINE No unexplained hair loss No heat or cold intolerance    ALLERGY No hives  Sore throat No   RESP Coughing blood no  Shortness of breath YES   NEURO No Headache  Confusion Pain neck No   CARDIAC No Chest pain No Palpitations   GI No Pain abdomen NO   Vomiting NO     PHYSICAL EXAM    HEENT Unremarkable PERRLA atraumatic   RESP Fair air entry EXP prolonged    Harsh breath sound Resp distres mild   CARDIAC S1 S2 No S3     NO JVD    ABDOMEN SOFT BS PRESENT NOT DISTENDED No hepatosplenomegaly PEDAL EDEMA present No calf tenderness  NO rash   GENERAL Not TOXIC looking    PATIENT EMIGDIO REEVES [facility] [phone]   1934 DOA 9/10/2019 DR HOLLY ZIEGLER                             Pt description                          85 m 1 ppd smoker quit age 40 retd [facility]  Has home O2  PMH Colon CA 2004 surgery chemo l colostomy ischemic colitis  AICD   CABG 2003 [place] to LAD DVG to OM1 /PDA   c STENOSIS 50-79% lica A fib on Xarelto PVD CKD 3 Prostate ca HO urine retn Does self catheterization  sev years PVD Angioplasty R SFA sp rle trombosis  amputatn 1st and 2nd metattarsal  HO lung nodule PET [facility] 2018   (Remote colon ca 2004 Prostate ca )   Mild but not focal FDG uptake corresponding to a RLL nodule SUV 1.3 1 x1 cm (prev 1.4x 1 )   2018  109% FEV1 238 88% FEV1% 56% Mild obstrn was admitted 9/10/2019 with weakness productive cough and nonhealing wound and managed as cellulitis and decomepnsated heart failure  Pulmonary consulted 2019 as he has pl effs and is short of breath                          PATIENT EMIGDIO REEVES [facility] [phone]   1934 DOA 9/10/2019 DR HOLLY ZIEGLER                             PULMONARY/CRITICAL CARE PATIENT DATA    ALLERGY   nka                             HEAD OF BED ELEVATION Yes       DIET cons carb (9/10)   RESP GAS EXCHANGE Has home O2   INFECTION CELLULITIS  blod culture cns  c diff n zosyn ()   PLEURAL EFFS  cxr congestion mild l effs   LUNG NODULE PET zw 2018 RLL nodule SUV 1.3 1 x1 cm (prev 1.4x 1 )   COPD 2018  109% FEV1 238 88% FEV1% 56%  duoneb.4 ()   A fib asa 81 () rivaroxab 15 ()   CAD 2019 Tr 1 n.4 atorvastat 40 () coreg 18.7x2 () fenofibrate 145 ()   CHF 9/10 bnp 8707  9/10 cxr chf   echo ef 35% pasp 49 dilated ivc lae Lasix 40.2 ()   ANEMIA norm 2019 Hb 8.4   CKD 2019 Cr 1.5

## 2019-09-12 NOTE — CONSULT NOTE ADULT - SUBJECTIVE AND OBJECTIVE BOX
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 Pt also admits to wheelchair induced injury to the left LE resulting in 10 stiches on 2019  pt stating area is cellulitic but it has not been treated outpatient. Per pt, [facility] which resulted in worsening b/l LE edema. Pt also admits to diarrhea x5 days- non bloody, no abdominal pain, nausea or vomiting. Pt denies recent travel or sick contacts. Pt denies dizziness, headache, TRUJILLO, chest pain, palpitations, worsening peripheral neuropathy.     When I see the patient the LLE is much improved with less erythema and improved swelling      PAST MEDICAL & SURGICAL HISTORY:  Oxygen dependent: wears 2LNC at HS  Ischemic bowel disease  Colon cancer: s/p chemo  Automatic implantable cardioverter-defibrillator in situ  Acute MI: s/p CABG x3  Hypertension  Congestive heart failure  Insomnia  Anemia, vitamin B12 deficiency  HTN (hypertension)  Ischemic colitis, enteritis, or enterocolitis: s/p partial colectomy  Vitamin B 12 deficiency  Prostate cancer: s/p radiation  Afib  CKD (chronic kidney disease)  MI (myocardial infarction)  PAD (peripheral artery disease): s/p stent, fem-fem bypass  CAD (coronary artery disease)  Peripheral Neuropathy  COPD (chronic obstructive pulmonary disease)  Colostomy care  Cataract extraction status of eye, right  S/P colostomy  S/P cardiac pacemaker procedure: AICD  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. 3.375 Gram(s) IV Intermittent every 8 hours      Immunological  influenza   Vaccine 0.5 milliLiter(s) IntraMuscular once      Other  ALBUTerol/ipratropium for Nebulization 3 milliLiter(s) Nebulizer every 6 hours  ALBUTerol/ipratropium for Nebulization. 3 milliLiter(s) Nebulizer once  aspirin enteric coated 81 milliGRAM(s) Oral daily  atorvastatin 40 milliGRAM(s) Oral at bedtime  carvedilol 18.75 milliGRAM(s) Oral every 12 hours  fenofibrate Tablet 145 milliGRAM(s) Oral daily  furosemide   Injectable 40 milliGRAM(s) IV Push every 12 hours  gabapentin 100 milliGRAM(s) Oral two times a day  mineral oil/petrolatum Hydrophilic Ointment 1 Application(s) Topical daily  rivaroxaban 15 milliGRAM(s) Oral with dinner      Allergies    No Known Allergies    Intolerances    SOCIAL HISTORY: no toxic habits    FAMILY HISTORY:  FH: prostate cancer  FH: HTN (hypertension)  Family history of colon cancer  Family history of MI (myocardial infarction)      ROS:    EYES:  Negative  blurry vision or double vision  GASTROINTESTINAL:  Negative for nausea, vomiting, diarrhea  -otherwise negative except for subjective    Vital Signs Last 24 Hrs  T(C): 36.8 (12 Sep 2019 04:35), Max: 37 (11 Sep 2019 20:53)  T(F): 98.2 (12 Sep 2019 04:35), Max: 98.6 (11 Sep 2019 20:53)  HR: 61 (12 Sep 2019 04:35) (60 - 70)  BP: 102/69 (12 Sep 2019 04:35) (99/54 - 129/71)  BP(mean): --  RR: 15 (12 Sep 2019 04:35) (15 - 18)  SpO2: 92% (12 Sep 2019 04:35) (92% - 96%)    PE:  WDWN in no distress  HEENT:  NC, PERRL, sclerae anicteric, conjunctivae clear, EOMI.  Sinuses nontender, no nasal exudate.  No buccal or pharyngeal lesions, erythema or exudate  Neck:  Supple, no adenopathy, noted JVD and hepatojugular reflux  Lungs:  No accessory muscle use, bilaterally clear to auscultation  Cor:  RRR, S1, S2, murmur appreciated  Abd:  Symmetric, normoactive BS.  Soft, nontender, no masses, guarding or rebound.  Liver and spleen not enlarged  Extrem:  No cyanosis or edema  Skin: left leg with scabbed area and minimal surrounding erythema  Neuro: grossly intact  Musc: moving all limbs freely, no focal deficits    LABS:                        8.4    7.99  )-----------( 219      ( 12 Sep 2019 07:51 )             26.5       WBC Count: 7.99 K/uL (19 @ 07:51)  WBC Count: 7.11 K/uL (19 @ 16:06)  WBC Count: 8.16 K/uL (09-10-19 @ 19:29)          132<L>  |  93<L>  |  29<H>  ----------------------------<  123<H>  4.2   |  33<H>  |  1.50<H>    Ca    9.8      12 Sep 2019 07:52  Phos  3.2       Mg     1.8         TPro  6.9  /  Alb  3.1<L>  /  TBili  0.6  /  DBili  x   /  AST  15  /  ALT  13  /  AlkPhos  38<L>        Creatinine, Serum: 1.50 mg/dL (19 @ 07:52)  Creatinine, Serum: 1.40 mg/dL (19 @ 04:32)  Creatinine, Serum: 1.50 mg/dL (09-10-19 @ 19:29)      Urinalysis Basic - ( 10 Sep 2019 19:29 )    Color: Yellow / Appearance: Clear / S.005 / pH: x  Gluc: x / Ketone: Negative  / Bili: Negative / Urobili: Negative   Blood: x / Protein: Negative / Nitrite: Negative   Leuk Esterase: Moderate / RBC: 0-2 /HPF / WBC 6-10   Sq Epi: x / Non Sq Epi: Occasional / Bacteria: Few            MICROBIOLOGY:  C Diff by PCR Result: NotDetec ( @ 21:26)      RADIOLOGY & ADDITIONAL STUDIES:    --

## 2019-09-12 NOTE — PROGRESS NOTE ADULT - ASSESSMENT
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 Admit for cellulitis and decompensated heart failure. 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 Admit for cellulitis and decompensated heart failure.    Chronic urinary retention: Neville inserted

## 2019-09-12 NOTE — PROGRESS NOTE ADULT - PROBLEM SELECTOR PLAN 3
Acute on chronic systolic CHF exacerbation; patient received extra dose of lasix overnight given ongoing effusions, respiratory distress: continues to have good output  -BNP 8707  -echo shows stable EF at 30-35%  -Continue Lasix 40mg IV BID   - Cardio, Dr. Hutchins, consulted recs appreciated  -Strict I&Os, daily weights, HOB elevated

## 2019-09-12 NOTE — PROGRESS NOTE ADULT - PROBLEM SELECTOR PLAN 1
- p/w generalized weakness. likely multifactorial. acute infection-cellulitis- decompensated heart failure  - Legionella negative: CXR shows increased congestion:  - cellulitis of lower extremity, will start IV abx. trend temp/leukocytosis, am labs.  - Blood cx coag negative staph, likely contaminate  - diarrhea - resolved: well formed stool in Colostomy bag; will d/c c. diff. PCR  - will monitor lytes/renal function and volume status closely.   - PT eval

## 2019-09-12 NOTE — CHART NOTE - NSCHARTNOTEFT_GEN_A_CORE
called by RN for patient with wheezing. Pt. laying comfortably in bed with audible expiratory wheezes from the upper airway. Denies shortness of breath or chest pain. Breathing is some what labored but patient is not in distress. Saturating 100% on 2L nasal canula. Pt. received duoneb treatment 2 hours prior. Will continue to monitor for now. If respirations become more labored or patient is having shortness of breath, will order abg and reevaluate.     Vital Signs Last 24 Hrs  T(C): 36.8 (12 Sep 2019 03:34), Max: 37 (11 Sep 2019 20:53)  T(F): 98.2 (12 Sep 2019 03:34), Max: 98.6 (11 Sep 2019 20:53)  HR: 60 (12 Sep 2019 03:34) (59 - 70)  BP: 128/69 (12 Sep 2019 03:34) (99/54 - 129/71)  BP(mean): --  RR: 18 (12 Sep 2019 03:34) (16 - 18)  SpO2: 93% (12 Sep 2019 03:34) (93% - 96%)    Physical Exam:  Gen: well appearing, NAD  Cardio: regular rate and rhythm, +s1s2, + murmurs, no rubs or gallops  Pulm: diminished breath sounds b/l, + expiratory wheeze in upper lung fields b/l  Abdomen: soft, nontender, nondistended, +BS x4 quadrants, no guarding

## 2019-09-12 NOTE — PROGRESS NOTE ADULT - SUBJECTIVE AND OBJECTIVE BOX
[facility] -- Claudio Gilliland, Maryanne, Shavon, Eduardo Candelario Savella  Office # [phone]      Follow Up:    HF exacerbation  Subjective/Observations:   No events overnight resting comfortably in bed.  No complaints of chest pain, dyspnea, or palpitations reported. No signs of PND. +orthopnea     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  Ischemic bowel disease  Colon cancer: s/p chemo  Automatic implantable cardioverter-defibrillator in situ  Acute MI: s/p CABG x3  Hypertension  Congestive heart failure  Insomnia  Anemia, vitamin B12 deficiency  HTN (hypertension)  Ischemic colitis, enteritis, or enterocolitis: s/p partial colectomy  Vitamin B 12 deficiency  Prostate cancer: s/p radiation  Afib  CKD (chronic kidney disease)  MI (myocardial infarction)  PAD (peripheral artery disease): s/p stent, fem-fem bypass  CAD (coronary artery disease)  Peripheral Neuropathy  COPD (chronic obstructive pulmonary disease)  Colostomy care  Cataract extraction status of eye, right  S/P colostomy  S/P cardiac pacemaker procedure: AICD  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  S/P small bowel resection  S/P colon resection  S/P cholecystectomy  S/P bypass graft of extremity: Left to right femoral-femoral artery bypass graft 10 years ago  S/P CABG x 3      MEDICATIONS  (STANDING):  ALBUTerol/ipratropium for Nebulization 3 milliLiter(s) Nebulizer every 6 hours  ALBUTerol/ipratropium for Nebulization. 3 milliLiter(s) Nebulizer once  aspirin enteric coated 81 milliGRAM(s) Oral daily  atorvastatin 40 milliGRAM(s) Oral at bedtime  carvedilol 18.75 milliGRAM(s) Oral every 12 hours  fenofibrate Tablet 145 milliGRAM(s) Oral daily  furosemide   Injectable 40 milliGRAM(s) IV Push every 12 hours  gabapentin 100 milliGRAM(s) Oral two times a day  influenza   Vaccine 0.5 milliLiter(s) IntraMuscular once  mineral oil/petrolatum Hydrophilic Ointment 1 Application(s) Topical daily  piperacillin/tazobactam IVPB.. 3.375 Gram(s) IV Intermittent every 8 hours  rivaroxaban 15 milliGRAM(s) Oral with dinner    MEDICATIONS  (PRN):      Allergies    No Known Allergies    Intolerances        Vital Signs Last 24 Hrs  T(C): 36.8 (12 Sep 2019 04:35), Max: 37 (11 Sep 2019 20:53)  T(F): 98.2 (12 Sep 2019 04:35), Max: 98.6 (11 Sep 2019 20:53)  HR: 61 (12 Sep 2019 04:35) (60 - 70)  BP: 102/69 (12 Sep 2019 04:35) (102/69 - 129/71)  BP(mean): --  RR: 15 (12 Sep 2019 04:35) (15 - 18)  SpO2: 92% (12 Sep 2019 04:35) (92% - 96%)    I&O's Summary    11 Sep 2019 07:01  -  12 Sep 2019 07:00  --------------------------------------------------------  IN: 450 mL / OUT: 1400 mL / NET: -950 mL    12 Sep 2019 07:01  -  12 Sep 2019 13:39  --------------------------------------------------------  IN: 100 mL / OUT: 0 mL / NET: 100 mL          PHYSICAL EXAM:  TELE: Off tele   Constitutional: NAD, awake and alert, well-developed  HEENT: Moist Mucous Membranes, Anicteric  Pulmonary: Non-labored, breath sounds with Bilateral expiratory wheezes throughout dim at bases   No  crackles or rhonchi   Cardiovascular: Irregular, S1 and S2 nl, No murmurs, rubs, gallops or clicks  Gastrointestinal: Bowel Sounds present, soft, nontender.   Lymph: No lymphadenopathy. +Yaya  Skin: No visible rashes or ulcers.  Psych:  Mood & affect appropriate    LABS: All Labs Reviewed:                        8.4    7.99  )-----------( 219      ( 12 Sep 2019 07:51 )             26.5                         7.9    7.11  )-----------( 196      ( 11 Sep 2019 16:06 )             24.9                         8.0    8.16  )-----------( 192      ( 10 Sep 2019 19:29 )             25.4     12 Sep 2019 07:52    132    |  93     |  29     ----------------------------<  123    4.2     |  33     |  1.50   11 Sep 2019 04:32    135    |  97     |  26     ----------------------------<  93     4.5     |  33     |  1.40   10 Sep 2019 19:29    132    |  96     |  30     ----------------------------<  109    4.8     |  28     |  1.50     Ca    9.8        12 Sep 2019 07:52  Ca    9.1        11 Sep 2019 04:32  Ca    9.3        10 Sep 2019 19:29  Phos  3.2       12 Sep 2019 07:52  Mg     1.8       12 Sep 2019 07:52    TPro  6.9    /  Alb  3.1    /  TBili  0.6    /  DBili  x      /  AST  15     /  ALT  13     /  AlkPhos  38     12 Sep 2019 07:52  TPro  6.6    /  Alb  3.1    /  TBili  0.5    /  DBili  x      /  AST  16     /  ALT  14     /  AlkPhos  37     11 Sep 2019 04:32  TPro  6.6    /  Alb  3.2    /  TBili  0.3    /  DBili  x      /  AST  22     /  ALT  17     /  AlkPhos  38     10 Sep 2019 19:29    PT/INR - ( 11 Sep 2019 04:32 )   PT: 14.6 sec;   INR: 1.27 ratio           CARDIAC MARKERS ( 11 Sep 2019 14:33 )  <.015 ng/mL / x     / x     / x     / x      CARDIAC MARKERS ( 11 Sep 2019 08:31 )  <.015 ng/mL / x     / x     / x     / x      CARDIAC MARKERS ( 11 Sep 2019 04:32 )  <.015 ng/mL / x     / x     / x     / x      CARDIAC MARKERS ( 11 Sep 2019 01:18 )  <.015 ng/mL / x     / 29 U/L / x     / 1.4 ng/mL         ECG:  < from: 12 Lead ECG (09.10.19 @ 18:07) >  Ventricular Rate 61 BPM    Atrial Rate 61 BPM    P-R Interval 274 ms    QRS Duration 180 ms    Q-T Interval 528 ms    QTC Calculation(Bezet) 531 ms    R Axis -52 degrees    T Axis 110 degrees    Diagnosis Line Ventricular-paced rhythm  Confirmed byCECI CANDELARIO ([id number]) on 9/11/2019 10:23:21 AM    < end of copied text >    Echo:  < from: TTE Echo Doppler w/o Cont (09.11.19 @ 17:32) >  EXAM:  ECHO TTE WO CON COMP W DOPPLR         PROCEDURE DATE:  09/11/2019        INTERPRETATION:  INDICATION: Heart failure    Blood Pressure 99/54    Height 182.8 cm     Weight 92 kg       BSA 2.07   sq m    Dimensions:    LA 5.1       Normal Values: 2.0 - 4.0 cm    Ao 3.6        Normal Values: 2.0 - 3.8 cm  SEPTUM 1.5       Normal Values: 0.6 - 1.2 cm  PWT 1.4       Normal Values: 0.6 - 1.1 cm  LVIDd 6.2         Normal Values: 3.0 - 5.6 cm  LVIDs 4.6         Normal Values: 1.8 - 4.0 cm      OBSERVATIONS:  Technically difficult study  Mitral Valve: Thickened leaflets, moderate to severe MR.  Aortic Valve/Aorta: Sclerotic trileaflet aortic valve with normal   opening. Trace AI  Tricuspid Valve: normal with trace TR.  Pulmonic Valve: Mild PI  Left Atrium: Enlarged  Right Atrium: normal  Left Ventricle: Moderate left ventricular systolic dysfunction, estimated   LVEF of 35-40%. Left ventricular hypertrophy and enlargement is noted.   Septal motion consistent with conduction delay  Right Ventricle: The right ventricle is not well visualized. Device wire   seen within the right heart  Pericardium/Pleura: normal, no significant pericardial effusion.  Pulmonary/RV Pressure: estimated PA systolic pressure of 49 mmHg     Conclusion:   Technically difficult study  Moderate left ventricular systolic dysfunction, estimated LVEF of 35-40%.   Left ventricular hypertrophy and enlargement is noted. Septal motion   consistent with conduction delay  Right ventricle is not well visualized. Devicewire is noted within the   right heart  Left atrial enlargement  Sclerotic trileaflet aortic valve with normal opening. Trace AI.   Moderate to severe MR   Trace TR.    Estimated PA systolic pressure of 49 mmHg. The IVC is dilated  No significant pericardial effusion.                  MEAGAN BEAL   This document has been electronically signed. Sep 12 2019  9:15AM    < end of copied text >    Radiology:  < from: Xray Chest 1 View-PORTABLE IMMEDIATE (09.11.19 @ 17:20) >  EXAM:  XR CHEST PORTABLE IMMED 1V                            PROCEDURE DATE:  09/11/2019          INTERPRETATION:  AP semierect chest on September 11, 2019 at 4:53 PM.   Patient is short of breath.    Heart is likely enlarged. Sternotomy and left-sided defibrillator again   noted.    There is a mild diffuse interstitial congestive picture with a mild left   base effusion.    Chest is similar to September 10.    IMPRESSION: Unchanged congestive findings.                CHEMA SOLANO M.D., ATTENDING RADIOLOGIST  This document has been electronically signed. Sep 11 2019  5:21PM                < end of copied text >           Stefan Miles ANP   Cardiology

## 2019-09-12 NOTE — PROGRESS NOTE ADULT - SUBJECTIVE AND OBJECTIVE BOX
HPI:  85yo M with PMH with CHF (LVEF 30% in Aug 2016) with AICD, CAD, MI s/p CABG x3 (2004), A-fib on Xarelto, prostate CA s/p radiation (2004), colon CA s/p chemo (2004), 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, HTN, HLD, COPD on home O2 (2L at night), iron def anemia s/p 3 transfusions at 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 Pt also admits to wheelchair induced injury to the left LE resulting in 10 stiches on 9/4/2019 now pt stating area is cellulitic but it has not been treated outpatient. Per pt, [facility] which resulted in worsening b/l LE edema. Pt also admits to diarrhea x5 days- non bloody, no abdominal pain, nausea or vomiting. Pt denies recent travel or sick contacts. Pt denies dizziness, headache, TRUJILLO, chest pain, palpitations, worsening peripheral neuropathy.     In the ED, VS T=98.2F, HR 61, /73, RR 16, SpO2 96% 2L NC.   Labs significant for H/H 8/25.4, Na+ 132, proBNP 8707  UA = lg amount of blood, mod leuk esterase   EKG: Sinus rhythm w/ 1st degree AV block, LAD, LBBB - changed from last available Aug 2018  CXR: appears mostly unchanged from prior with chronic changes- will f/u formal read. (10 Sep 2019 22:30)      INTERVAL EVENTS:  Overnight events noted.  Patient continues to feel SOB and run down.  Denies chest pain, heart palpitations, n/v, increased ostomy output, fever/chills.    REVIEW OF SYSTEMS:    CONSTITUTIONAL: + weakness; No fevers or chills  EYES/ENT: No visual changes, no throat pain   RESPIRATORY: + cough, wheezing, SOB; No hemoptysis;  CARDIOVASCULAR: No chest pain or palpitations  GASTROINTESTINAL: No abdominal pain, nausea, vomiting, or hematemesis; No diarrhea or constipation. No melena or hematochezia.  GENITOURINARY: No dysuria, frequency or hematuria  NEUROLOGICAL: + confusion overnight; No dizziness, numbness, or weakness  SKIN: No itching, burning, rashes, or lesions   All other review of systems is negative unless indicated above.    VITAL SIGNS:  T(C): 36.8 (09-12-19 @ 04:35), Max: 37 (09-11-19 @ 20:53)  HR: 61 (09-12-19 @ 04:35) (60 - 70)  BP: 102/69 (09-12-19 @ 04:35) (99/54 - 129/71)  RR: 15 (09-12-19 @ 04:35) (15 - 18)  SpO2: 92% (09-12-19 @ 04:35) (92% - 96%)    PHYSICAL EXAM:     GENERAL: no acute distress  HEENT: NC/AT, EOMI, neck supple, MMM  RESPIRATORY: diffuse expiratory wheeze and coarse upper respiratory sounds  CARDIOVASCULAR: RRR, no murmurs, gallops, rubs  ABDOMINAL: soft, non-tender, non-distended, positive bowel sounds: ostomy intact w/ no surround erythema  EXTREMITIES: no clubbing, cyanosis, or edema: decreased erythema around 3-5cm eschar on LLE  NEUROLOGICAL: alert and oriented x 3, non-focal  SKIN: decreased erythema around 3-5cm eschar on LLE  MUSCULOSKELETAL: no gross joint deformity                          8.4    7.99  )-----------( 219      ( 12 Sep 2019 07:51 )             26.5     09-12    132<L>  |  93<L>  |  29<H>  ----------------------------<  123<H>  4.2   |  33<H>  |  1.50<H>    Ca    9.8      12 Sep 2019 07:52  Phos  3.2     09-12  Mg     1.8     09-12    TPro  6.9  /  Alb  3.1<L>  /  TBili  0.6  /  DBili  x   /  AST  15  /  ALT  13  /  AlkPhos  38<L>  09-12      CAPILLARY BLOOD GLUCOSE          MEDICATIONS  (STANDING):  ALBUTerol/ipratropium for Nebulization 3 milliLiter(s) Nebulizer every 6 hours  ALBUTerol/ipratropium for Nebulization. 3 milliLiter(s) Nebulizer once  aspirin enteric coated 81 milliGRAM(s) Oral daily  atorvastatin 40 milliGRAM(s) Oral at bedtime  carvedilol 18.75 milliGRAM(s) Oral every 12 hours  fenofibrate Tablet 145 milliGRAM(s) Oral daily  furosemide   Injectable 40 milliGRAM(s) IV Push every 12 hours  gabapentin 100 milliGRAM(s) Oral two times a day  influenza   Vaccine 0.5 milliLiter(s) IntraMuscular once  mineral oil/petrolatum Hydrophilic Ointment 1 Application(s) Topical daily  piperacillin/tazobactam IVPB.. 3.375 Gram(s) IV Intermittent every 8 hours  rivaroxaban 15 milliGRAM(s) Oral with dinner

## 2019-09-12 NOTE — CONSULT NOTE ADULT - ASSESSMENT
PATIENT EMIGDIO REEVES [facility] P [phone]   1934 DOA 9/10/2019 DR HOLLY LEE                            PULMONARY/CRITICAL CARE ASSESSMENT/RECOMMENDATIONS LIST                   RESP GAS EXCHANGE   Monitor pulse ox prn sob and adjust O2 to keep po 90-95%  PLEURAL EFFUSION   Is likely sec to CHF Doubt that it is contributing significantly to dyspnea and even if we drained it it will likely recur as it is probably from his s CHF Will observe at this point  COPD   Continue duoneb   A fib On xarelto   INFECTION Cellulitis On zosyn cns blod culture () likely contaminant                           TIME SPENT Over 55 minutes aggregate care time spent on encounter; activities included   direct pt care, counseling and/or coordinating care reviewing notes, lab data/ imaging , discussion with multidisciplinary team/ pt /family. Risks, benefits, alternatives  discussed in deta

## 2019-09-12 NOTE — PROGRESS NOTE ADULT - ATTENDING COMMENTS
The patient was personally seen and examined, in addition to being examined and evaluated by NP.  All elements of the note were edited where appropriate.    -severe hf/dyspnea with impaired resp status  -addl iv lasix given  -follow volume closely  -will follow

## 2019-09-12 NOTE — PROGRESS NOTE ADULT - ASSESSMENT
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 Patient also presenting with bilateral lower extremity edema and TRUJILLO. At home takes a tailored dose of torsemide (10-20mg) depending on his weight. Admits of >2lb increase.     - No clear evidence of acute ischemia, trops negative x 3.   - EKG shows 1st degree AV block, left axis deviation and LBBB which is a new finding when compared to previous EKG.   - Bilateral lower extremity edema and mild lower lobe crackles concerning for volume overload likely 2/2 Acute on chronic systolic CHF.     - TTE w/ LVdf EF 35-40%, mod MR and dilated IVC  - BP well controlled, monitor routine hemodynamics.  - Continue furosemide 40 IV q 12 for acute on chronic CHF.  - Continue ASA, carvedilol 18.75 mg q12, fenofibrate 145 mg for CAD    - Resume statin drug  - Monitor and replete lytes, keep K>4, Mg>2.  - Strict I/Os, daily weights. Negative 950 ml   - Other cardiovascular workup will depend on clinical course.  - All other workup per primary team.  - Will continue to follow.  Stefan Miles [facility]  Cardiology   Spectra #[phone] 83 yo M M with PMH with CHF (LVEF 30% in Aug 2016) with AICD, CAD, MI s/p CABG x3 (2004), A-fib on Xarelto, prostate CA s/p radiation (2004), colon CA s/p chemo (2004), 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, HTN, HLD, COPD on home O2 (2L at night), iron def anemia s/p 3 transfusions? 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 Patient also presenting with bilateral lower extremity edema and TRUJILLO. At home takes a tailored dose of torsemide (10-20mg) depending on his weight. Admits of >2lb increase.     -dyspnea and wheeze overnight  -evaluated by intern, given 40mg iv lasix early this am  -follow vol status carefully, may need more aggressive diuresis, especially noting grady pleural effusions on echo    - No clear evidence of acute ischemia, trops negative x 3.   - EKG shows 1st degree AV block, left axis deviation and LBBB which is a new finding when compared to previous EKG.   - Bilateral lower extremity edema and mild lower lobe crackles concerning for volume overload likely 2/2 Acute on chronic systolic CHF.     - TTE w/ LVdf EF 35-40%, mod MR and dilated IVC  - BP well controlled, monitor routine hemodynamics.   - Continue ASA, carvedilol 18.75 mg q12, fenofibrate 145 mg for CAD    - Resume statin drug  - Monitor and replete lytes, keep K>4, Mg>2.  - Strict I/Os, daily weights. Negative 950 ml   - Other cardiovascular workup will depend on clinical course.  - All other workup per primary team.  - Will continue to follow.  Stefan Miles [facility]  Cardiology   Spectra #[phone]

## 2019-09-12 NOTE — PROGRESS NOTE ADULT - PROBLEM SELECTOR PLAN 2
Acute, s/p traumatic injury requiring 10 sutures   -Left medial LE warm, tender and erythematous w/ central eschar & known vascular disease on this leg  -Start IV Zosyn and IV vanco   -Wound care consulted  -Follow up blood cultures  -Follow up AM labs Acute, s/p traumatic injury requiring 10 sutures   -Left medial LE warm, tender and erythematous w/ central eschar & known vascular disease on this leg  -cont IV Zosyn and  d/c IV vanco, B/c contaminated with coag negative staph    -Wound care consulted  -Follow up blood cultures  -Follow up AM labs

## 2019-09-12 NOTE — CONSULT NOTE ADULT - ASSESSMENT
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

## 2019-09-13 ENCOUNTER — APPOINTMENT (OUTPATIENT)
Dept: PULMONOLOGY | Facility: CLINIC | Age: 84
End: 2019-09-13

## 2019-09-13 LAB
BASE EXCESS BLDA CALC-SCNC: 6.2 MMOL/L — HIGH (ref -2–2)
BLOOD GAS COMMENTS ARTERIAL: SIGNIFICANT CHANGE UP
CULTURE RESULTS: SIGNIFICANT CHANGE UP
HCO3 BLDA-SCNC: 30 MMOL/L — HIGH (ref 23–27)
HCT VFR BLD CALC: 27.2 % — LOW (ref 39–50)
HGB BLD-MCNC: 8.7 G/DL — LOW (ref 13–17)
HOROWITZ INDEX BLDA+IHG-RTO: 36 — SIGNIFICANT CHANGE UP
MCHC RBC-ENTMCNC: 30.5 PG — SIGNIFICANT CHANGE UP (ref 27–34)
MCHC RBC-ENTMCNC: 32 GM/DL — SIGNIFICANT CHANGE UP (ref 32–36)
MCV RBC AUTO: 95.4 FL — SIGNIFICANT CHANGE UP (ref 80–100)
NRBC # BLD: 0 /100 WBCS — SIGNIFICANT CHANGE UP (ref 0–0)
PCO2 BLDA: 51 MMHG — HIGH (ref 32–46)
PH BLDA: 7.4 — SIGNIFICANT CHANGE UP (ref 7.35–7.45)
PLATELET # BLD AUTO: 226 K/UL — SIGNIFICANT CHANGE UP (ref 150–400)
PO2 BLDA: 114 MMHG — HIGH (ref 74–108)
RBC # BLD: 2.85 M/UL — LOW (ref 4.2–5.8)
RBC # FLD: 15 % — HIGH (ref 10.3–14.5)
SAO2 % BLDA: 98 % — HIGH (ref 92–96)
SPECIMEN SOURCE: SIGNIFICANT CHANGE UP
WBC # BLD: 8 K/UL — SIGNIFICANT CHANGE UP (ref 3.8–10.5)
WBC # FLD AUTO: 8 K/UL — SIGNIFICANT CHANGE UP (ref 3.8–10.5)

## 2019-09-13 PROCEDURE — 71045 X-RAY EXAM CHEST 1 VIEW: CPT | Mod: 26

## 2019-09-13 PROCEDURE — 99232 SBSQ HOSP IP/OBS MODERATE 35: CPT | Mod: GC

## 2019-09-13 PROCEDURE — 99232 SBSQ HOSP IP/OBS MODERATE 35: CPT

## 2019-09-13 RX ORDER — CEFUROXIME AXETIL 250 MG
250 TABLET ORAL EVERY 12 HOURS
Refills: 0 | Status: COMPLETED | OUTPATIENT
Start: 2019-09-13 | End: 2019-09-17

## 2019-09-13 RX ORDER — FUROSEMIDE 40 MG
60 TABLET ORAL
Refills: 0 | Status: DISCONTINUED | OUTPATIENT
Start: 2019-09-13 | End: 2019-09-18

## 2019-09-13 RX ORDER — FUROSEMIDE 40 MG
40 TABLET ORAL ONCE
Refills: 0 | Status: COMPLETED | OUTPATIENT
Start: 2019-09-13 | End: 2019-09-13

## 2019-09-13 RX ADMIN — Medication 3 MILLILITER(S): at 14:09

## 2019-09-13 RX ADMIN — Medication 81 MILLIGRAM(S): at 11:54

## 2019-09-13 RX ADMIN — Medication 3 MILLILITER(S): at 07:06

## 2019-09-13 RX ADMIN — CARVEDILOL PHOSPHATE 18.75 MILLIGRAM(S): 80 CAPSULE, EXTENDED RELEASE ORAL at 22:48

## 2019-09-13 RX ADMIN — PIPERACILLIN AND TAZOBACTAM 25 GRAM(S): 4; .5 INJECTION, POWDER, LYOPHILIZED, FOR SOLUTION INTRAVENOUS at 06:03

## 2019-09-13 RX ADMIN — RIVAROXABAN 15 MILLIGRAM(S): KIT at 22:48

## 2019-09-13 RX ADMIN — Medication 40 MILLIGRAM(S): at 18:10

## 2019-09-13 RX ADMIN — Medication 3 MILLILITER(S): at 00:44

## 2019-09-13 RX ADMIN — ATORVASTATIN CALCIUM 40 MILLIGRAM(S): 80 TABLET, FILM COATED ORAL at 22:48

## 2019-09-13 RX ADMIN — GABAPENTIN 100 MILLIGRAM(S): 400 CAPSULE ORAL at 06:03

## 2019-09-13 RX ADMIN — Medication 3 MILLILITER(S): at 21:09

## 2019-09-13 RX ADMIN — Medication 1 APPLICATION(S): at 11:52

## 2019-09-13 RX ADMIN — CARVEDILOL PHOSPHATE 18.75 MILLIGRAM(S): 80 CAPSULE, EXTENDED RELEASE ORAL at 06:03

## 2019-09-13 RX ADMIN — Medication 40 MILLIGRAM(S): at 06:03

## 2019-09-13 RX ADMIN — Medication 145 MILLIGRAM(S): at 11:54

## 2019-09-13 RX ADMIN — GABAPENTIN 100 MILLIGRAM(S): 400 CAPSULE ORAL at 22:48

## 2019-09-13 RX ADMIN — Medication 40 MILLIGRAM(S): at 16:04

## 2019-09-13 RX ADMIN — Medication 250 MILLIGRAM(S): at 22:48

## 2019-09-13 NOTE — PROGRESS NOTE ADULT - SUBJECTIVE AND OBJECTIVE BOX
[facility] -- Claudio Gilliland, Shavon Madden, Eduardo Hutchins Savella  Office # [phone]      Follow Up:    Shortness of breath   Subjective/Observations:     Seen at bedside sitting in chair with family present. + Dyspnea, + orthopnea with some improvement from yesterday  Denies chest pain dizziness palpitations fever chills      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  Ischemic bowel disease  Colon cancer: s/p chemo  Automatic implantable cardioverter-defibrillator in situ  Acute MI: s/p CABG x3  Hypertension  Congestive heart failure  Insomnia  Anemia, vitamin B12 deficiency  HTN (hypertension)  Ischemic colitis, enteritis, or enterocolitis: s/p partial colectomy  Vitamin B 12 deficiency  Prostate cancer: s/p radiation  Afib  CKD (chronic kidney disease)  MI (myocardial infarction)  PAD (peripheral artery disease): s/p stent, fem-fem bypass  CAD (coronary artery disease)  Peripheral Neuropathy  COPD (chronic obstructive pulmonary disease)  Colostomy care  Cataract extraction status of eye, right  S/P colostomy  S/P cardiac pacemaker procedure: AICD  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  S/P small bowel resection  S/P colon resection  S/P cholecystectomy  S/P bypass graft of extremity: Left to right femoral-femoral artery bypass graft 10 years ago  S/P CABG x 3      MEDICATIONS  (STANDING):  ALBUTerol/ipratropium for Nebulization 3 milliLiter(s) Nebulizer every 6 hours  aspirin enteric coated 81 milliGRAM(s) Oral daily  atorvastatin 40 milliGRAM(s) Oral at bedtime  carvedilol 18.75 milliGRAM(s) Oral every 12 hours  cefuroxime   Tablet 250 milliGRAM(s) Oral every 12 hours  fenofibrate Tablet 145 milliGRAM(s) Oral daily  furosemide   Injectable 40 milliGRAM(s) IV Push every 12 hours  gabapentin 100 milliGRAM(s) Oral two times a day  influenza   Vaccine 0.5 milliLiter(s) IntraMuscular once  mineral oil/petrolatum Hydrophilic Ointment 1 Application(s) Topical daily  rivaroxaban 15 milliGRAM(s) Oral with dinner    MEDICATIONS  (PRN):      Allergies    No Known Allergies    Intolerances        Vital Signs Last 24 Hrs  T(C): 36.4 (13 Sep 2019 05:31), Max: 36.6 (12 Sep 2019 20:46)  T(F): 97.5 (13 Sep 2019 05:31), Max: 97.8 (12 Sep 2019 20:46)  HR: 68 (13 Sep 2019 07:07) (60 - 74)  BP: 149/73 (13 Sep 2019 09:56) (116/66 - 149/73)  BP(mean): --  RR: 17 (13 Sep 2019 05:31) (16 - 17)  SpO2: 88% (13 Sep 2019 09:56) (88% - 98%)    I&O's Summary    12 Sep 2019 07:01  -  13 Sep 2019 07:00  --------------------------------------------------------  IN: 200 mL / OUT: 1700 mL / NET: -1500 mL          PHYSICAL EXAM:  TELE: vpaced  Constitutional: NAD, awake and alert, well-developed  HEENT: Moist Mucous Membranes, Anicteric  Pulmonary: Diffuse expiratory wheeze, Diminished breath sounds bilaterally   Cardiovascular: IRRegular, S1 and S2 nl, No murmurs, rubs, gallops or clicks  Gastrointestinal: Bowel Sounds present, soft, nontender.   Lymph: No lymphadenopathy.trace LE edema   Skin: No visible rashes or ulcers.  Psych:  Mood & affect appropriate    LABS: All Labs Reviewed:                        8.4    7.99  )-----------( 219      ( 12 Sep 2019 07:51 )             26.5                         7.9    7.11  )-----------( 196      ( 11 Sep 2019 16:06 )             24.9                         8.0    8.16  )-----------( 192      ( 10 Sep 2019 19:29 )             25.4     12 Sep 2019 07:52    132    |  93     |  29     ----------------------------<  123    4.2     |  33     |  1.50   11 Sep 2019 04:32    135    |  97     |  26     ----------------------------<  93     4.5     |  33     |  1.40   10 Sep 2019 19:29    132    |  96     |  30     ----------------------------<  109    4.8     |  28     |  1.50     Ca    9.8        12 Sep 2019 07:52  Ca    9.1        11 Sep 2019 04:32  Ca    9.3        10 Sep 2019 19:29  Phos  3.2       12 Sep 2019 07:52  Mg     1.8       12 Sep 2019 07:52    TPro  6.9    /  Alb  3.1    /  TBili  0.6    /  DBili  x      /  AST  15     /  ALT  13     /  AlkPhos  38     12 Sep 2019 07:52  TPro  6.6    /  Alb  3.1    /  TBili  0.5    /  DBili  x      /  AST  16     /  ALT  14     /  AlkPhos  37     11 Sep 2019 04:32  TPro  6.6    /  Alb  3.2    /  TBili  0.3    /  DBili  x      /  AST  22     /  ALT  17     /  AlkPhos  38     10 Sep 2019 19:29      CARDIAC MARKERS ( 11 Sep 2019 14:33 )  <.015 ng/mL / x     / x     / x     / x             ECG:  < from: 12 Lead ECG (09.10.19 @ 18:07) >    Ventricular Rate 61 BPM    Atrial Rate 61 BPM    P-R Interval 274 ms    QRS Duration 180 ms    Q-T Interval 528 ms    QTC Calculation(Bezet) 531 ms    R Axis -52 degrees    T Axis 110 degrees    Diagnosis Line Ventricular-paced rhythm    < end of copied text >      Echo:  < from: TTE Echo Doppler w/o Cont (09.11.19 @ 17:32) >    Conclusion:   Technically difficult study  Moderate left ventricular systolic dysfunction, estimated LVEF of 35-40%.   Left ventricular hypertrophy and enlargement is noted. Septal motion   consistent with conduction delay  Right ventricle is not well visualized. Devicewire is noted within the   right heart  Left atrial enlargement  Sclerotic trileaflet aortic valve with normal opening. Trace AI.   Moderate to severe MR   Trace TR.    Estimated PA systolic pressure of 49 mmHg. The IVC is dilated  No significant pericardial effusion.    < end of copied text >    Radiology:

## 2019-09-13 NOTE — PROGRESS NOTE ADULT - ASSESSMENT
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 Keep K>4.0 and Mg>2.0.  Continue with ASA Coreg Lipitor Fenofibrate, no ace arb givenCKD    ID LE Cellultiits  Follow up Blood cultures  Antibiotics as per primary team     COPD  Supplemental O2  nebulizers PRn    Anemia  Anemia work up as per primary team  Keep hgb > 8 given CAD history   IF transfusing given split units, lasix in between and frequent lung checks     Afib  Rate controlled on Coreg  Thromboembolism ppx on Xarelto PO    HLD  Continue with Lipitor    Family requesting palliative consultation  PT evaluation- obtain RA o2 sat   Fall precautions    Jacqueline Dennis FNP-C  Cardiology NP  SPECTRA [id number] [phone]

## 2019-09-13 NOTE — PROGRESS NOTE ADULT - SUBJECTIVE AND OBJECTIVE BOX
Patient was examined Chart reviewed Detailed note will be inserted below after going over latest data Meanwhile please refer to my previous notes for Pulmonary/Critical Care assessment recommendations EMIGDIO REEVES [facility] [phone]   1934 DOA 9/10/2019 DR HOLLY ZIEGLER   ALLERGY     nka    CONTACT     sp Esperanza RODRIGUEZ       Initial evaluation/Pulmonary Critical Care consultation requested on 2019   by Dr Ziegler  from Dr Singh   Patient examined chart reviewed    HOSPITAL ADMISSION   PATIENT CAME  FROM (if information available)        TYPE OF VISIT      Subsequent Pulmonary followup     REASON FOR VISIT  PLEASE SEE PROBLEM LIST/ASSESSMENT AND RECOMMENDATIONS     PATIENT EMIGDIO REEVES [facility] P [phone]   1934 DOA 9/10/2019 DR HOLLY ZIEGLER     VITALS/LABS       2019 afeb 61 148/70 17 91%   2019 afeb 61 102/69 15 92%   W 7.9 Hb 8.4 Plt 219 Na 132 K 4.2 CO2 33 Cr 1.5    REVIEW OF SYMPTOMS     NOTE Noteworthy changes  if any  in ROS and PE are also entered  in note  below      Able to give ROS  Yes     RELIABLE No   CONSTITUTIONAL Weakness Yes  Chills No Vision changes No  ENDOCRINE No unexplained hair loss No heat or cold intolerance    ALLERGY No hives  Sore throat No   RESP Coughing blood no  Shortness of breath YES   NEURO No Headache  Confusion Pain neck No   CARDIAC No Chest pain No Palpitations   GI No Pain abdomen NO   Vomiting NO     PHYSICAL EXAM    HEENT Unremarkable PERRLA atraumatic   RESP Fair air entry EXP prolonged    Harsh breath sound Resp distres mild   CARDIAC S1 S2 No S3     NO JVD    ABDOMEN SOFT BS PRESENT NOT DISTENDED No hepatosplenomegaly PEDAL EDEMA present No calf tenderness  NO rash   GENERAL Not TOXIC looking    PATIENT EMIGDIO REEVES [facility] [phone]   1934 DOA 9/10/2019 DR HOLLY ZIEGLER                             Pt description                          85 m 1 ppd smoker quit age 40 [facility]  Has home O2  PMH Colon CA 2004 surgery chemo l colostomy ischemic colitis  AICD   CABG 2003 [place] to LAD DVG to OM1 /PDA   c STENOSIS 50-79% lica A fib on Xarelto PVD CKD 3 Prostate ca HO urine retn Does self catheterization  sev years PVD Angioplasty R SFA sp rle trombosis  amputatn 1st and 2nd metattarsal  HO lung nodule PET [facility]wanger 2018   (Remote colon ca 2004 Prostate ca )   Mild but not focal FDG uptake corresponding to a RLL nodule SUV 1.3 1 x1 cm (prev 1.4x 1 )   2018  109% FEV1 238 88% FEV1% 56% Mild obstrn was admitted 9/10/2019 with weakness productive cough and nonhealing wound and managed as cellulitis and decomepnsated heart failure  Pulmonary consulted 2019 as he has pl effs and is short of breath

## 2019-09-13 NOTE — PROGRESS NOTE ADULT - ASSESSMENT
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 Admit for cellulitis and decompensated heart failure.    Chronic urinary retention: Neville inserted

## 2019-09-13 NOTE — PHYSICAL THERAPY INITIAL EVALUATION ADULT - PERTINENT HX OF CURRENT PROBLEM, REHAB EVAL
84yo M presents to ED complaining of profound weakness, lethargy and productive cough w/ brown colored sputum x2 weeks. Pt also admits to LLE injury resulting in 10 stiches on 9/4/2019. [facility]. Pt also admits to diarrhea x5 days- non bloody, no abdominal pain, nausea or vomiting. CXR: appears mostly unchanged.

## 2019-09-13 NOTE — PROGRESS NOTE ADULT - PROBLEM SELECTOR PLAN 1
- p/w generalized weakness. likely multifactorial. acute infection-cellulitis- decompensated heart failure  - Legionella negative: CXR shows increased congestion:  - cellulitis of lower extremity, transition to oral abx. trend temp/leukocytosis, am labs.  - Blood cx coag negative staph, likely contaminate  - diarrhea - resolved: well formed stool in Colostomy bag;  - will monitor lytes/renal function and volume status closely.   - PT eval

## 2019-09-13 NOTE — PROGRESS NOTE ADULT - SUBJECTIVE AND OBJECTIVE BOX
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 Pt also admits to wheelchair induced injury to the left LE resulting in 10 stiches on 9/4/2019 now pt stating area is cellulitic but it has not been treated outpatient. Per pt, [facility] which resulted in worsening b/l LE edema.     INTERVAL EVENTS:  Patient seen and examined; overnight events noted.  Patient more confused this AM, but cooperative with exam.    REVIEW OF SYSTEMS:    CONSTITUTIONAL: No weakness, fevers or chills  EYES/ENT: No visual changes, no throat pain   RESPIRATORY: + cough, wheezing, shortness of breath;  CARDIOVASCULAR: No chest pain or palpitations  GASTROINTESTINAL: No abdominal pain, nausea, vomiting, or hematemesis; No diarrhea or constipation. No melena or hematochezia.  GENITOURINARY: No dysuria, frequency or hematuria  NEUROLOGICAL: No dizziness, numbness, or weakness  SKIN: + LLE wound  All other review of systems is negative unless indicated above.    VITAL SIGNS:  T(C): 36.4 (09-13-19 @ 05:31), Max: 36.6 (09-12-19 @ 20:46)  HR: 68 (09-13-19 @ 07:07) (60 - 74)  BP: 149/73 (09-13-19 @ 09:56) (116/66 - 149/73)  RR: 17 (09-13-19 @ 05:31) (16 - 17)  SpO2: 88% (09-13-19 @ 09:56) (88% - 98%)    PHYSICAL EXAM:     GENERAL: no acute distress  HEENT: NC/AT, EOMI, neck supple, MMM  RESPIRATORY: diffuse expiratory wheeze throughout  CARDIOVASCULAR: RRR, no murmurs, gallops, rubs  ABDOMINAL: soft, non-tender, non-distended, positive bowel sounds   EXTREMITIES: no clubbing, cyanosis, or edema  NEUROLOGICAL: alert and oriented x 1, non-focal  SKIN: no rashes or lesions   MUSCULOSKELETAL: no gross joint deformity                          8.4    7.99  )-----------( 219      ( 12 Sep 2019 07:51 )             26.5     09-12    132<L>  |  93<L>  |  29<H>  ----------------------------<  123<H>  4.2   |  33<H>  |  1.50<H>    Ca    9.8      12 Sep 2019 07:52  Phos  3.2     09-12  Mg     1.8     09-12    TPro  6.9  /  Alb  3.1<L>  /  TBili  0.6  /  DBili  x   /  AST  15  /  ALT  13  /  AlkPhos  38<L>  09-12      CAPILLARY BLOOD GLUCOSE          MEDICATIONS  (STANDING):  ALBUTerol/ipratropium for Nebulization 3 milliLiter(s) Nebulizer every 6 hours  aspirin enteric coated 81 milliGRAM(s) Oral daily  atorvastatin 40 milliGRAM(s) Oral at bedtime  carvedilol 18.75 milliGRAM(s) Oral every 12 hours  cefuroxime   Tablet 250 milliGRAM(s) Oral every 12 hours  fenofibrate Tablet 145 milliGRAM(s) Oral daily  furosemide   Injectable 40 milliGRAM(s) IV Push every 12 hours  gabapentin 100 milliGRAM(s) Oral two times a day  influenza   Vaccine 0.5 milliLiter(s) IntraMuscular once  mineral oil/petrolatum Hydrophilic Ointment 1 Application(s) Topical daily  rivaroxaban 15 milliGRAM(s) Oral with dinner

## 2019-09-13 NOTE — PROGRESS NOTE ADULT - ASSESSMENT
PATIENT EMIGDIO REEVES [facility] P [phone]   1934 DOA 9/10/2019 DR HOLLY LEE                            PULMONARY/CRITICAL CARE ASSESSMENT/RECOMMENDATIONS LIST                   RESP GAS EXCHANGE   Monitor pulse ox prn sob and adjust O2 to keep po 90-95%  PLEURAL EFFUSION   Is likely sec to CHF Doubt that it is contributing significantly to dyspnea and even if we drained it it will likely recur as it is probably from his s CHF Will observe at this point  COPD   Continue duoneb Under control   A fib On xarelto   INFECTION Cellulitis On zosyn cns blod culture () likely contaminant                                   TIME SPENT Over 25 minutes aggregate care time spent on encounter; activities included   direct pt care, counseling and/or coordinating care reviewing notes, lab data/ imaging , discussion with multidisciplinary team/ pt /family. Risks, benefits, alternatives  discussed in detail.

## 2019-09-13 NOTE — DISCHARGE NOTE PROVIDER - HOSPITAL COURSE
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 Pt also admits to wheelchair induced injury to the left LE resulting in 10 stiches on 9/4/2019 now pt stating area is cellulitic but it has not been treated outpatient. Per pt, [facility] which resulted in worsening b/l LE edema, admitted w/ left lower extremity cellulitis and acute on chronic CHF exacerbation.        Patient admitted to [facility] for IV antibiotics for cellulitis, and diuresis.  Patient was started on Vanco/Zosyn for full antibiotic coverage.  Dr. Almaraz ID was consulted.  Patient was transitioned to oral Ceftin 250mg bid, and tolerated well.        Regarding CHF, Dr. Clark, cardio was consulted.  Patient was started on Lasix 40mg IVP bid.  Repeat echo showed EF of 35%.

## 2019-09-13 NOTE — DISCHARGE NOTE PROVIDER - NSDCFUSCHEDAPPT_GEN_ALL_CORE_FT
LEANDRO BEAL ; 10/07/2019 ; [facility] [address] ; 10/07/2019 ; [facility] [address] ; 11/18/2019 ; [facility] [address] ; 11/18/2019 ; [facility] [address]

## 2019-09-13 NOTE — PROGRESS NOTE ADULT - PROBLEM SELECTOR PLAN 2
Acute, s/p traumatic injury requiring 10 sutures   -Left medial LE warm, tender and erythematous w/ central eschar & known vascular disease on this leg  - will transition to oral ceftin 250mg bid;  -Wound care consulted  -Follow up blood cultures  -Follow up AM labs

## 2019-09-13 NOTE — PROGRESS NOTE ADULT - PROBLEM SELECTOR PLAN 3
Acute on chronic systolic CHF exacerbation; respiratory distress: continues to have good output  -BNP 8707  -echo shows stable EF at 30-35%  -Continue Lasix 40mg IV BID   - Cardio, consulted recs appreciated  -Strict I&Os, daily weights, HOB elevated

## 2019-09-13 NOTE — CHART NOTE - NSCHARTNOTEFT_GEN_A_CORE
Called by RN for Pt with blood in alvarado. Patient agitated, combative and disoriented. 60cc of dark red blood in alvarado chamber and line    T(C): 36.4 (09-13-19 @ 05:31), Max: 36.6 (09-12-19 @ 20:46)  HR: 61 (09-13-19 @ 05:31) (60 - 64)  BP: 148/74 (09-13-19 @ 05:31) (116/66 - 148/74)  RR: 17 (09-13-19 @ 05:31) (16 - 17)  SpO2: 91% (09-13-19 @ 05:31) (91% - 98%)  Wt(kg): --    Physical :  unable to exam due to patient agitation        Assessment/Plan  83yo M with pmhx of CHF,CAD, MI, ischemic coltis admitted for cellulitis and decompensated heart failure.    1. Hematuria  -pt. with 60cc of dark red blood in alvarado, previously with clear urine  -pt. confused and pulling at alvarado tubing, refusing exam, unable to redirect --- blood most likely secondary to traumatic injury  -chronic alvarado due to chronic urinary retention -- consider urology consult

## 2019-09-14 LAB
-  AMOXICILLIN/CLAVULANIC ACID: SIGNIFICANT CHANGE UP
-  AMPICILLIN/SULBACTAM: SIGNIFICANT CHANGE UP
-  AMPICILLIN: SIGNIFICANT CHANGE UP
-  CEFAZOLIN: SIGNIFICANT CHANGE UP
-  CEFTRIAXONE: SIGNIFICANT CHANGE UP
-  CIPROFLOXACIN: SIGNIFICANT CHANGE UP
-  CLINDAMYCIN: SIGNIFICANT CHANGE UP
-  DAPTOMYCIN: SIGNIFICANT CHANGE UP
-  ERYTHROMYCIN: SIGNIFICANT CHANGE UP
-  GENTAMICIN: SIGNIFICANT CHANGE UP
-  LEVOFLOXACIN: SIGNIFICANT CHANGE UP
-  LINEZOLID: SIGNIFICANT CHANGE UP
-  MEROPENEM: SIGNIFICANT CHANGE UP
-  MOXIFLOXACIN(AEROBIC): SIGNIFICANT CHANGE UP
-  OXACILLIN: SIGNIFICANT CHANGE UP
-  PENICILLIN: SIGNIFICANT CHANGE UP
-  RIFAMPIN: SIGNIFICANT CHANGE UP
-  TETRACYCLINE: SIGNIFICANT CHANGE UP
-  TRIMETHOPRIM/SULFAMETHOXAZOLE: SIGNIFICANT CHANGE UP
-  VANCOMYCIN: SIGNIFICANT CHANGE UP
ALBUMIN SERPL ELPH-MCNC: 2.9 G/DL — LOW (ref 3.3–5)
ALP SERPL-CCNC: 35 U/L — LOW (ref 40–120)
ALT FLD-CCNC: 19 U/L — SIGNIFICANT CHANGE UP (ref 12–78)
ANION GAP SERPL CALC-SCNC: 8 MMOL/L — SIGNIFICANT CHANGE UP (ref 5–17)
AST SERPL-CCNC: 18 U/L — SIGNIFICANT CHANGE UP (ref 15–37)
BASE EXCESS BLDA CALC-SCNC: 7.6 MMOL/L — HIGH (ref -2–2)
BASOPHILS # BLD AUTO: 0.04 K/UL — SIGNIFICANT CHANGE UP (ref 0–0.2)
BASOPHILS NFR BLD AUTO: 0.5 % — SIGNIFICANT CHANGE UP (ref 0–2)
BILIRUB SERPL-MCNC: 0.5 MG/DL — SIGNIFICANT CHANGE UP (ref 0.2–1.2)
BLOOD GAS COMMENTS ARTERIAL: SIGNIFICANT CHANGE UP
BUN SERPL-MCNC: 32 MG/DL — HIGH (ref 7–23)
CALCIUM SERPL-MCNC: 9.3 MG/DL — SIGNIFICANT CHANGE UP (ref 8.5–10.1)
CHLORIDE SERPL-SCNC: 95 MMOL/L — LOW (ref 96–108)
CO2 SERPL-SCNC: 34 MMOL/L — HIGH (ref 22–31)
CREAT SERPL-MCNC: 1.4 MG/DL — HIGH (ref 0.5–1.3)
CULTURE RESULTS: SIGNIFICANT CHANGE UP
EOSINOPHIL # BLD AUTO: 0.04 K/UL — SIGNIFICANT CHANGE UP (ref 0–0.5)
EOSINOPHIL NFR BLD AUTO: 0.5 % — SIGNIFICANT CHANGE UP (ref 0–6)
GLUCOSE SERPL-MCNC: 88 MG/DL — SIGNIFICANT CHANGE UP (ref 70–99)
HCO3 BLDA-SCNC: 31 MMOL/L — HIGH (ref 23–27)
HCT VFR BLD CALC: 26.9 % — LOW (ref 39–50)
HGB BLD-MCNC: 8.6 G/DL — LOW (ref 13–17)
HOROWITZ INDEX BLDA+IHG-RTO: 40 — SIGNIFICANT CHANGE UP
IMM GRANULOCYTES NFR BLD AUTO: 0.4 % — SIGNIFICANT CHANGE UP (ref 0–1.5)
INR BLD: 2.8 RATIO — HIGH (ref 0.88–1.16)
LYMPHOCYTES # BLD AUTO: 0.61 K/UL — LOW (ref 1–3.3)
LYMPHOCYTES # BLD AUTO: 7.8 % — LOW (ref 13–44)
MCHC RBC-ENTMCNC: 30.9 PG — SIGNIFICANT CHANGE UP (ref 27–34)
MCHC RBC-ENTMCNC: 32 GM/DL — SIGNIFICANT CHANGE UP (ref 32–36)
MCV RBC AUTO: 96.8 FL — SIGNIFICANT CHANGE UP (ref 80–100)
METHOD TYPE: SIGNIFICANT CHANGE UP
MONOCYTES # BLD AUTO: 1.09 K/UL — HIGH (ref 0–0.9)
MONOCYTES NFR BLD AUTO: 13.9 % — SIGNIFICANT CHANGE UP (ref 2–14)
NEUTROPHILS # BLD AUTO: 6.03 K/UL — SIGNIFICANT CHANGE UP (ref 1.8–7.4)
NEUTROPHILS NFR BLD AUTO: 76.9 % — SIGNIFICANT CHANGE UP (ref 43–77)
NRBC # BLD: 0 /100 WBCS — SIGNIFICANT CHANGE UP (ref 0–0)
ORGANISM # SPEC MICROSCOPIC CNT: SIGNIFICANT CHANGE UP
PCO2 BLDA: 53 MMHG — HIGH (ref 32–46)
PH BLDA: 7.4 — SIGNIFICANT CHANGE UP (ref 7.35–7.45)
PHOSPHATE SERPL-MCNC: 2.7 MG/DL — SIGNIFICANT CHANGE UP (ref 2.5–4.5)
PLATELET # BLD AUTO: 249 K/UL — SIGNIFICANT CHANGE UP (ref 150–400)
PO2 BLDA: 126 MMHG — HIGH (ref 74–108)
POTASSIUM SERPL-MCNC: 3.4 MMOL/L — LOW (ref 3.5–5.3)
POTASSIUM SERPL-SCNC: 3.4 MMOL/L — LOW (ref 3.5–5.3)
PROT SERPL-MCNC: 6.6 G/DL — SIGNIFICANT CHANGE UP (ref 6–8.3)
PROTHROM AB SERPL-ACNC: 32.9 SEC — HIGH (ref 10–12.9)
RBC # BLD: 2.78 M/UL — LOW (ref 4.2–5.8)
RBC # FLD: 15.2 % — HIGH (ref 10.3–14.5)
SAO2 % BLDA: 99 % — HIGH (ref 92–96)
SODIUM SERPL-SCNC: 137 MMOL/L — SIGNIFICANT CHANGE UP (ref 135–145)
SPECIMEN SOURCE: SIGNIFICANT CHANGE UP
WBC # BLD: 7.84 K/UL — SIGNIFICANT CHANGE UP (ref 3.8–10.5)
WBC # FLD AUTO: 7.84 K/UL — SIGNIFICANT CHANGE UP (ref 3.8–10.5)

## 2019-09-14 PROCEDURE — 71045 X-RAY EXAM CHEST 1 VIEW: CPT | Mod: 26

## 2019-09-14 PROCEDURE — 99233 SBSQ HOSP IP/OBS HIGH 50: CPT

## 2019-09-14 RX ADMIN — GABAPENTIN 100 MILLIGRAM(S): 400 CAPSULE ORAL at 17:48

## 2019-09-14 RX ADMIN — Medication 60 MILLIGRAM(S): at 17:47

## 2019-09-14 RX ADMIN — Medication 250 MILLIGRAM(S): at 09:14

## 2019-09-14 RX ADMIN — Medication 81 MILLIGRAM(S): at 13:42

## 2019-09-14 RX ADMIN — Medication 3 MILLILITER(S): at 13:38

## 2019-09-14 RX ADMIN — ATORVASTATIN CALCIUM 40 MILLIGRAM(S): 80 TABLET, FILM COATED ORAL at 22:09

## 2019-09-14 RX ADMIN — Medication 250 MILLIGRAM(S): at 17:38

## 2019-09-14 RX ADMIN — GABAPENTIN 100 MILLIGRAM(S): 400 CAPSULE ORAL at 08:30

## 2019-09-14 RX ADMIN — RIVAROXABAN 15 MILLIGRAM(S): KIT at 17:38

## 2019-09-14 RX ADMIN — Medication 60 MILLIGRAM(S): at 06:14

## 2019-09-14 RX ADMIN — Medication 3 MILLILITER(S): at 20:38

## 2019-09-14 RX ADMIN — Medication 3 MILLILITER(S): at 07:42

## 2019-09-14 RX ADMIN — Medication 145 MILLIGRAM(S): at 13:42

## 2019-09-14 RX ADMIN — CARVEDILOL PHOSPHATE 18.75 MILLIGRAM(S): 80 CAPSULE, EXTENDED RELEASE ORAL at 09:14

## 2019-09-14 RX ADMIN — Medication 1 APPLICATION(S): at 17:48

## 2019-09-14 RX ADMIN — CARVEDILOL PHOSPHATE 18.75 MILLIGRAM(S): 80 CAPSULE, EXTENDED RELEASE ORAL at 17:38

## 2019-09-14 RX ADMIN — Medication 3 MILLILITER(S): at 01:42

## 2019-09-14 NOTE — PROGRESS NOTE ADULT - ATTENDING COMMENTS
I saw and examined the patient personally. Spoke with above provider regarding this case. I reviewed the above findings completely.  I agree with the above history, physical, and plan which I have edited where appropriate.     yest with res distress and improved with bipap and extra lasix. still decompensated. will increase lasix to 60mg q12. Please continue to maintain strict I/Os, monitor daily weights, Cr, and K. bipap as needed  pulm f/u

## 2019-09-14 NOTE — PROGRESS NOTE ADULT - ASSESSMENT
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 Admit for cellulitis and decompensated heart failure.    Chronic urinary retention: Neville inserted     Acute respiratory hypercapnic failure: 2/2 acute on chronic CHF exacerbation, started on BIPAP and given IV lasix. improved.     Acute metabolic encephalopathy: 2/2 hypercapnia respiratory failure, resolved.     Code Status: family and POA decided for DNR, DNI. MOLST reviewed and spoke to family at least for 30 min regarding MOSLT review and code status, they understood and agreed with above plan..

## 2019-09-14 NOTE — PROGRESS NOTE ADULT - SUBJECTIVE AND OBJECTIVE BOX
Patient was examined Chart reviewed Detailed note will be inserted below after going over latest data Meanwhile please refer to my previous notes for Pulmonary/Critical Care assessment recommendations EMIGDIO REEVES [facility] P [phone]   1934 DOA 9/10/2019 DR HOLLY ZIEGLER   ALLERGY     nka    CONTACT     sp Esperanza RODRIGUEZ       Initial evaluation/Pulmonary Critical Care consultation requested on 2019   by Dr Ziegler  from Dr Singh   Patient examined chart reviewed    HOSPITAL ADMISSION   PATIENT CAME  FROM (if information available)        TYPE OF VISIT      Subsequent Pulmonary followup     REASON FOR VISIT  PLEASE SEE PROBLEM LIST/ASSESSMENT AND RECOMMENDATIONS     PATIENT EMIGDIO REEVES [facility] P [phone]   1934 DOA 9/10/2019 DR HOLLY ZIEGLER     VITALS/LABS       2019 afeb 60 108/59 18 92%   2019 W 7.8 Hb 8.6 Plt 249 INR 2.8 Na 137 K 3.4 CO2 34 Cr 1.4     REVIEW OF SYMPTOMS     NOTE Noteworthy changes  if any  in ROS and PE are also entered  in note  below      Able to give ROS  Yes     RELIABLE No   CONSTITUTIONAL Weakness Yes  Chills No Vision changes No  ENDOCRINE No unexplained hair loss No heat or cold intolerance    ALLERGY No hives  Sore throat No   RESP Coughing blood no  Shortness of breath YES   NEURO No Headache  Confusion Pain neck No   CARDIAC No Chest pain No Palpitations   GI No Pain abdomen NO   Vomiting NO     PHYSICAL EXAM    HEENT Unremarkable PERRLA atraumatic   RESP Fair air entry EXP prolonged    Harsh breath sound Resp distres mild   CARDIAC S1 S2 No S3     NO JVD    ABDOMEN SOFT BS PRESENT NOT DISTENDED No hepatosplenomegaly PEDAL EDEMA present No calf tenderness  NO rash   GENERAL Not TOXIC looking    PATIENT EMIGDIO REEVES [facility] P [phone]   1934 DOA 9/10/2019 DR HOLLY ZIEGLER                             Pt description                          85 m 1 ppd smoker quit age 40 retd [facility]  Has home O2  PMH Colon CA 2004 surgery chemo l colostomy ischemic colitis 2015 AICD   CABG 2003 [place] to LAD DVG to OM1 /PDA   c STENOSIS 50-79% lica A fib on Xarelto PVD CKD 3 Prostate ca HO urine retn Does self catheterization  sev years PVD Angioplasty R SFA sp rle trombosis  amputatn 1st and 2nd metattarsal  HO lung nodule PET [facility] 2018   (Remote colon ca 2004 Prostate ca )   Mild but not focal FDG uptake corresponding to a RLL nodule SUV 1.3 1 x1 cm (prev 1.4x 1 )   2018  109% FEV1 238 88% FEV1% 56% Mild obstrn was admitted 9/10/2019 with weakness productive cough and nonhealing wound and managed as cellulitis and decomepnsated heart failure  Pulmonary consulted 2019 as he has pl effs and is short of breath               PATIENT EMIGDIO REEVES [facility] P [phone]   1934 DOA 9/10/2019 DR HOLLY ZIEGLER                             PATIENT DATA    ALLERGY   nka                             HEAD OF BED ELEVATION Yes   DVT PROPHYLAXIS rivaroca()     LUNG NODULE   PET zw 2018 RLL nodule SUV 1.3 1 x1 cm (prev 1.4x 1 )   2019 CT Chest [facility]   cw 2018 pet ct 2018   1) Infiltrate sup segment lll   2) New 9 mm spiculated nodule rul   3) prev 1.1 cm solid rll nodule is unchanged   4) old lll calc granuloma   5) increased is prominence likely sec to pvc   6) New bl effsns     ADVANCED DIRECTIVE   dnr ()   DIET   cons carb (9/10)   RESP GAS EXCHANGE   2019 6a bpap //.4 740/53/126  2019 4p 4l 740/51/114   Has home O2 Target po 90-95%  PLEURAL EFFS   9/10 cxr chf   echo ef 35% pasp 49 dilated ivc lae   cxr congestion mild l effs   COPD   2018  109% FEV1 238 88% FEV1% 56%  Moderate obstructive pattent  On duoneb.4 ()   Under copntrol Cont Rx

## 2019-09-14 NOTE — PROGRESS NOTE ADULT - SUBJECTIVE AND OBJECTIVE BOX
[facility] -- Claudio Gilliland, Maryanne, Shavon, Eduardo Hutchins Savella  Office # [phone]      Follow Up:    SOB  Subjective/Observations:   Resting comfortably in bed.  No complaints of chest pain, dyspnea, or palpitations reported. No signs of orthopnea or PND. + orthopnea, events from yesterday and overnight noted requirement of bipap, pt states his breathing is less labored then yesterday afternoon    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  Ischemic bowel disease  Colon cancer: s/p chemo  Automatic implantable cardioverter-defibrillator in situ  Acute MI: s/p CABG x3  Hypertension  Congestive heart failure  Insomnia  Anemia, vitamin B12 deficiency  HTN (hypertension)  Ischemic colitis, enteritis, or enterocolitis: s/p partial colectomy  Vitamin B 12 deficiency  Prostate cancer: s/p radiation  Afib  CKD (chronic kidney disease)  MI (myocardial infarction)  PAD (peripheral artery disease): s/p stent, fem-fem bypass  CAD (coronary artery disease)  Peripheral Neuropathy  COPD (chronic obstructive pulmonary disease)  Colostomy care  Cataract extraction status of eye, right  S/P colostomy  S/P cardiac pacemaker procedure: AICD  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  S/P small bowel resection  S/P colon resection  S/P cholecystectomy  S/P bypass graft of extremity: Left to right femoral-femoral artery bypass graft 10 years ago  S/P CABG x 3      MEDICATIONS  (STANDING):  ALBUTerol/ipratropium for Nebulization 3 milliLiter(s) Nebulizer every 6 hours  aspirin enteric coated 81 milliGRAM(s) Oral daily  atorvastatin 40 milliGRAM(s) Oral at bedtime  carvedilol 18.75 milliGRAM(s) Oral every 12 hours  cefuroxime   Tablet 250 milliGRAM(s) Oral every 12 hours  fenofibrate Tablet 145 milliGRAM(s) Oral daily  furosemide   Injectable 60 milliGRAM(s) IV Push two times a day  gabapentin 100 milliGRAM(s) Oral two times a day  influenza   Vaccine 0.5 milliLiter(s) IntraMuscular once  mineral oil/petrolatum Hydrophilic Ointment 1 Application(s) Topical daily  rivaroxaban 15 milliGRAM(s) Oral with dinner    MEDICATIONS  (PRN):      Allergies    No Known Allergies    Intolerances        Vital Signs Last 24 Hrs  T(C): 36.4 (14 Sep 2019 05:54), Max: 36.5 (13 Sep 2019 20:39)  T(F): 97.6 (14 Sep 2019 05:54), Max: 97.7 (13 Sep 2019 20:39)  HR: 60 (14 Sep 2019 09:15) (59 - 99)  BP: 117/68 (14 Sep 2019 09:15) (117/68 - 149/73)  BP(mean): --  RR: 18 (14 Sep 2019 05:54) (17 - 20)  SpO2: 95% (14 Sep 2019 09:15) (65% - 100%)    I&O's Summary    13 Sep 2019 07:01  -  14 Sep 2019 07:00  --------------------------------------------------------  IN: 0 mL / OUT: 2100 mL / NET: -2100 mL          PHYSICAL EXAM:  TELE: Afib w/ freq PVC  Constitutional: NAD, awake and alert, well-developed  HEENT: Moist Mucous Membranes, Anicteric  Pulmonary: Non-labored, breath sounds with Bilateral expiratory wheezes throughout, cracks and diminished No  rhonchi   Cardiovascular: Irregular, S1 and S2 nl, + murmur No rubs, gallops or clicks   Gastrointestinal: Bowel Sounds present, soft, nontender.   Lymph: No lymphadenopathy. No peripheral edema.  Skin: No visible rashes or ulcers.  Psych:  Mood & affect appropriate    LABS: All Labs Reviewed:                        8.7    8.00  )-----------( 226      ( 13 Sep 2019 16:42 )             27.2                         8.4    7.99  )-----------( 219      ( 12 Sep 2019 07:51 )             26.5                         7.9    7.11  )-----------( 196      ( 11 Sep 2019 16:06 )             24.9     12 Sep 2019 07:52    132    |  93     |  29     ----------------------------<  123    4.2     |  33     |  1.50     Ca    9.8        12 Sep 2019 07:52  Phos  3.2       12 Sep 2019 07:52  Mg     1.8       12 Sep 2019 07:52    TPro  6.9    /  Alb  3.1    /  TBili  0.6    /  DBili  x      /  AST  15     /  ALT  13     /  AlkPhos  38     12 Sep 2019 07:52        CG:  < from: 12 Lead ECG (09.10.19 @ 18:07) >    Ventricular Rate 61 BPM    Atrial Rate 61 BPM    P-R Interval 274 ms    QRS Duration 180 ms    Q-T Interval 528 ms    QTC Calculation(Bezet) 531 ms    R Axis -52 degrees    T Axis 110 degrees    Diagnosis Line Ventricular-paced rhythm    < end of copied text >      Echo:  < from: TTE Echo Doppler w/o Cont (09.11.19 @ 17:32) >    Conclusion:   Technically difficult study  Moderate left ventricular systolic dysfunction, estimated LVEF of 35-40%.   Left ventricular hypertrophy and enlargement is noted. Septal motion   consistent with conduction delay  Right ventricle is not well visualized. Devicewire is noted within the   right heart  Left atrial enlargement  Sclerotic trileaflet aortic valve with normal opening. Trace AI.   Moderate to severe MR   Trace TR.    Estimated PA systolic pressure of 49 mmHg. The IVC is dilated  No significant pericardial effusion.    < end of copied text > [facility] -- Claudio Gilliland, Maryanne, Shavon, Eduardo Hutchins Savella  Office # [phone]      Follow Up:    SOB    Subjective/Observations:   Resting comfortably in bed.  No complaints of chest pain, dyspnea, or palpitations reported. No signs of orthopnea or PND. + orthopnea, events from yesterday and overnight noted requirement of bipap, pt states his breathing is less labored then yesterday afternoon    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  Ischemic bowel disease  Colon cancer: s/p chemo  Automatic implantable cardioverter-defibrillator in situ  Acute MI: s/p CABG x3  Hypertension  Congestive heart failure  Insomnia  Anemia, vitamin B12 deficiency  HTN (hypertension)  Ischemic colitis, enteritis, or enterocolitis: s/p partial colectomy  Vitamin B 12 deficiency  Prostate cancer: s/p radiation  Afib  CKD (chronic kidney disease)  MI (myocardial infarction)  PAD (peripheral artery disease): s/p stent, fem-fem bypass  CAD (coronary artery disease)  Peripheral Neuropathy  COPD (chronic obstructive pulmonary disease)  Colostomy care  Cataract extraction status of eye, right  S/P colostomy  S/P cardiac pacemaker procedure: AICD  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  S/P small bowel resection  S/P colon resection  S/P cholecystectomy  S/P bypass graft of extremity: Left to right femoral-femoral artery bypass graft 10 years ago  S/P CABG x 3      MEDICATIONS  (STANDING):  ALBUTerol/ipratropium for Nebulization 3 milliLiter(s) Nebulizer every 6 hours  aspirin enteric coated 81 milliGRAM(s) Oral daily  atorvastatin 40 milliGRAM(s) Oral at bedtime  carvedilol 18.75 milliGRAM(s) Oral every 12 hours  cefuroxime   Tablet 250 milliGRAM(s) Oral every 12 hours  fenofibrate Tablet 145 milliGRAM(s) Oral daily  furosemide   Injectable 60 milliGRAM(s) IV Push two times a day  gabapentin 100 milliGRAM(s) Oral two times a day  influenza   Vaccine 0.5 milliLiter(s) IntraMuscular once  mineral oil/petrolatum Hydrophilic Ointment 1 Application(s) Topical daily  rivaroxaban 15 milliGRAM(s) Oral with dinner    MEDICATIONS  (PRN):      Allergies    No Known Allergies    Intolerances        Vital Signs Last 24 Hrs  T(C): 36.4 (14 Sep 2019 05:54), Max: 36.5 (13 Sep 2019 20:39)  T(F): 97.6 (14 Sep 2019 05:54), Max: 97.7 (13 Sep 2019 20:39)  HR: 60 (14 Sep 2019 09:15) (59 - 99)  BP: 117/68 (14 Sep 2019 09:15) (117/68 - 149/73)  BP(mean): --  RR: 18 (14 Sep 2019 05:54) (17 - 20)  SpO2: 95% (14 Sep 2019 09:15) (65% - 100%)    I&O's Summary    13 Sep 2019 07:01  -  14 Sep 2019 07:00  --------------------------------------------------------  IN: 0 mL / OUT: 2100 mL / NET: -2100 mL          PHYSICAL EXAM:  TELE: Afib w/ freq PVC  Constitutional: NAD, awake and alert, well-developed  HEENT: Moist Mucous Membranes, Anicteric  Pulmonary: Non-labored, breath sounds with Bilateral expiratory wheezes throughout, crackless and diminished No  rhonchi   Cardiovascular: Irregular, S1 and S2 nl, + murmur No rubs, gallops or clicks   Gastrointestinal: Bowel Sounds present, soft, nontender.   Lymph: No lymphadenopathy. No peripheral edema.  Skin: No visible rashes or ulcers.  Psych:  Mood & affect appropriate    LABS: All Labs Reviewed:                        8.7    8.00  )-----------( 226      ( 13 Sep 2019 16:42 )             27.2                         8.4    7.99  )-----------( 219      ( 12 Sep 2019 07:51 )             26.5                         7.9    7.11  )-----------( 196      ( 11 Sep 2019 16:06 )             24.9     12 Sep 2019 07:52    132    |  93     |  29     ----------------------------<  123    4.2     |  33     |  1.50     Ca    9.8        12 Sep 2019 07:52  Phos  3.2       12 Sep 2019 07:52  Mg     1.8       12 Sep 2019 07:52    TPro  6.9    /  Alb  3.1    /  TBili  0.6    /  DBili  x      /  AST  15     /  ALT  13     /  AlkPhos  38     12 Sep 2019 07:52        CG:  < from: 12 Lead ECG (09.10.19 @ 18:07) >    Ventricular Rate 61 BPM    Atrial Rate 61 BPM    P-R Interval 274 ms    QRS Duration 180 ms    Q-T Interval 528 ms    QTC Calculation(Bezet) 531 ms    R Axis -52 degrees    T Axis 110 degrees    Diagnosis Line Ventricular-paced rhythm    < end of copied text >      Echo:  < from: TTE Echo Doppler w/o Cont (09.11.19 @ 17:32) >    Conclusion:   Technically difficult study  Moderate left ventricular systolic dysfunction, estimated LVEF of 35-40%.   Left ventricular hypertrophy and enlargement is noted. Septal motion   consistent with conduction delay  Right ventricle is not well visualized. Devicewire is noted within the   right heart  Left atrial enlargement  Sclerotic trileaflet aortic valve with normal opening. Trace AI.   Moderate to severe MR   Trace TR.    Estimated PA systolic pressure of 49 mmHg. The IVC is dilated  No significant pericardial effusion.    < end of copied text >

## 2019-09-14 NOTE — PROGRESS NOTE ADULT - SUBJECTIVE AND OBJECTIVE BOX
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 Admit for cellulitis and decompensated heart failure.     MEDICATIONS  (STANDING):  ALBUTerol/ipratropium for Nebulization 3 milliLiter(s) Nebulizer every 6 hours  aspirin enteric coated 81 milliGRAM(s) Oral daily  atorvastatin 40 milliGRAM(s) Oral at bedtime  carvedilol 18.75 milliGRAM(s) Oral every 12 hours  cefuroxime   Tablet 250 milliGRAM(s) Oral every 12 hours  fenofibrate Tablet 145 milliGRAM(s) Oral daily  furosemide   Injectable 60 milliGRAM(s) IV Push two times a day  gabapentin 100 milliGRAM(s) Oral two times a day  influenza   Vaccine 0.5 milliLiter(s) IntraMuscular once  mineral oil/petrolatum Hydrophilic Ointment 1 Application(s) Topical daily  rivaroxaban 15 milliGRAM(s) Oral with dinner    MEDICATIONS  (PRN):      Allergies    No Known Allergies    Intolerances        REVIEW OF SYSTEMS:  CONSTITUTIONAL: No fever, weight loss, or fatigue  EYES: No eye pain, visual disturbances, or discharge  ENMT:  No difficulty hearing, tinnitus, vertigo; No sinus or throat pain  NECK: No pain or stiffness  BREASTS: No pain, masses, or nipple discharge  RESPIRATORY: No cough, wheezing, chills or hemoptysis; No shortness of breath  CARDIOVASCULAR: No chest pain, palpitations, dizziness, or leg swelling  GASTROINTESTINAL: No abdominal or epigastric pain. No nausea, vomiting, or hematemesis; No diarrhea or constipation. No melena or hematochezia.  GENITOURINARY: No dysuria, frequency, hematuria, or incontinence  NEUROLOGICAL: No headaches, memory loss, loss of strength, numbness, or tremors  SKIN: No itching, burning, rashes, or lesions   LYMPH NODES: No enlarged glands  ENDOCRINE: No heat or cold intolerance; No hair loss; No polydipsia or polyuria  MUSCULOSKELETAL: No joint pain or swelling; No muscle, back, or extremity pain  PSYCHIATRIC: No depression, anxiety, mood swings, or difficulty sleeping  HEME/LYMPH: No easy bruising, or bleeding gums  ALLERGY AND IMMUNOLOGIC: No hives or eczema    Vital Signs Last 24 Hrs  T(C): 36.7 (14 Sep 2019 13:37), Max: 36.7 (14 Sep 2019 13:37)  T(F): 98 (14 Sep 2019 13:37), Max: 98 (14 Sep 2019 13:37)  HR: 60 (14 Sep 2019 13:37) (59 - 99)  BP: 108/59 (14 Sep 2019 13:37) (108/59 - 143/85)  BP(mean): --  RR: 18 (14 Sep 2019 13:37) (17 - 20)  SpO2: 92% (14 Sep 2019 13:37) (65% - 100%)    PHYSICAL EXAM:  GENERAL: NAD, well-groomed, well-developed  HEAD:  Atraumatic, Normocephalic  EYES: EOMI, PERRLA, conjunctiva and sclera clear  ENMT: No tonsillar erythema, exudates, or enlargement; Moist mucous membranes, Good dentition, No lesions  NECK: Supple, No JVD, Normal thyroid  NERVOUS SYSTEM:  Alert & Oriented X3, Good concentration; Motor Strength 5/5 B/L upper and lower extremities; DTRs 2+ intact and symmetric  CHEST/LUNG: Clear to auscultation bilaterally; No rales, rhonchi, wheezing, or rubs  HEART: Regular rate and rhythm; No murmurs, rubs, or gallops  ABDOMEN: Soft, Nontender, Nondistended; Bowel sounds present  EXTREMITIES:  2+ Peripheral Pulses, No clubbing, cyanosis, or edema  LYMPH: No lymphadenopathy noted  SKIN: No rashes or lesions    LABS:                        8.6    7.84  )-----------( 249      ( 14 Sep 2019 10:48 )             26.9     14 Sep 2019 10:48    137    |  95     |  32     ----------------------------<  88     3.4     |  34     |  1.40     Ca    9.3        14 Sep 2019 10:48  Phos  2.7       14 Sep 2019 10:48    TPro  6.6    /  Alb  2.9    /  TBili  0.5    /  DBili  x      /  AST  18     /  ALT  19     /  AlkPhos  35     14 Sep 2019 10:48    PT/INR - ( 14 Sep 2019 10:48 )   PT: 32.9 sec;   INR: 2.80 ratio             CAPILLARY BLOOD GLUCOSE          RADIOLOGY & ADDITIONAL TESTS:    Imaging Personally Reviewed:  [x ] YES  [ ] NO    Consultant(s) Notes Reviewed:  [ x] YES  [ ] NO    Care Discussed with Consultants/Other Providers [x ] YES  [ ] NO

## 2019-09-14 NOTE — PROGRESS NOTE ADULT - PROBLEM SELECTOR PLAN 3
Acute on chronic systolic CHF exacerbation; respiratory distress: continues to have good output  -BNP 8707  -echo shows stable EF at 30-35%  -Continue Lasix 60mg IV BID   - Cardio, consulted recs appreciated  -Strict I&Os, daily weights, HOB elevated

## 2019-09-14 NOTE — PROGRESS NOTE ADULT - ASSESSMENT
PATIENT EMIGDIO REEVES [facility] P [phone]   1934 DOA 9/10/2019 DR HOLLY LEE                            PULMONARY/CRITICAL CARE ASSESSMENT/RECOMMENDATIONS LIST                   RESP GAS EXCHANGE   Patient seems to have compensated chronic resp acidosis likely from copd (was smoker retd )   May use bpap as needed if he has increased work breathing   Monitor pulse ox prn sob and adjust O2 to keep po 90-95%  PLEURAL EFFUSION   Is likely sec to CHF Doubt that it is contributing significantly to dyspnea and even if we drained it it will likely recur as it is probably from his s CHF Will observe at this point    COPD   Continue duoneb Under control   LUNG NODULE   Pt has new rul nodule He will need cts eval  He should come see me in office [phone] within 2 w after discharge  A fib   On xarelto   INFECTION   Cellulitis On ceftin    cns blod culture () likely contaminant                       TIME SPENT Over 25 minutes aggregate care time spent on encounter; activities included   direct pt care, counseling and/or coordinating care reviewing notes, lab data/ imaging , discussion with multidisciplinary team/ pt /family. Risks, benefits, alternatives  discussed in detail.

## 2019-09-14 NOTE — PROGRESS NOTE ADULT - ASSESSMENT
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 Keep K>4.0 and Mg>2.0.  -Labs pending   Continue with ASA Coreg Lipitor Fenofibrate, no ace arb givenCKD    ID LE Cellultiits  Follow up Blood cultures  Antibiotics as per primary team     COPD  Supplemental O2  nebulizers PRn    Anemia  Anemia work up as per primary team  Keep hgb > 8 given CAD history   IF transfusing given split units, lasix in between and frequent lung checks     Afib  Rate controlled on Coreg  Thromboembolism ppx on Xarelto PO    HLD  Continue with Lipitor    Family requesting palliative consultation  PT evaluation- obtain RA o2 sat   Fall precautions    Stefan Miles [facility]  Cardiology   Spectra #[phone]

## 2019-09-15 LAB
ANION GAP SERPL CALC-SCNC: 7 MMOL/L — SIGNIFICANT CHANGE UP (ref 5–17)
BUN SERPL-MCNC: 37 MG/DL — HIGH (ref 7–23)
CALCIUM SERPL-MCNC: 9.6 MG/DL — SIGNIFICANT CHANGE UP (ref 8.5–10.1)
CHLORIDE SERPL-SCNC: 95 MMOL/L — LOW (ref 96–108)
CO2 SERPL-SCNC: 37 MMOL/L — HIGH (ref 22–31)
CREAT SERPL-MCNC: 1.4 MG/DL — HIGH (ref 0.5–1.3)
GLUCOSE SERPL-MCNC: 98 MG/DL — SIGNIFICANT CHANGE UP (ref 70–99)
HCT VFR BLD CALC: 27.9 % — LOW (ref 39–50)
HGB BLD-MCNC: 8.7 G/DL — LOW (ref 13–17)
MAGNESIUM SERPL-MCNC: 1.8 MG/DL — SIGNIFICANT CHANGE UP (ref 1.6–2.6)
MCHC RBC-ENTMCNC: 30.7 PG — SIGNIFICANT CHANGE UP (ref 27–34)
MCHC RBC-ENTMCNC: 31.2 GM/DL — LOW (ref 32–36)
MCV RBC AUTO: 98.6 FL — SIGNIFICANT CHANGE UP (ref 80–100)
NRBC # BLD: 0 /100 WBCS — SIGNIFICANT CHANGE UP (ref 0–0)
PHOSPHATE SERPL-MCNC: 2.9 MG/DL — SIGNIFICANT CHANGE UP (ref 2.5–4.5)
PLATELET # BLD AUTO: 265 K/UL — SIGNIFICANT CHANGE UP (ref 150–400)
POTASSIUM SERPL-MCNC: 3.4 MMOL/L — LOW (ref 3.5–5.3)
POTASSIUM SERPL-SCNC: 3.4 MMOL/L — LOW (ref 3.5–5.3)
RBC # BLD: 2.83 M/UL — LOW (ref 4.2–5.8)
RBC # FLD: 15.3 % — HIGH (ref 10.3–14.5)
SODIUM SERPL-SCNC: 139 MMOL/L — SIGNIFICANT CHANGE UP (ref 135–145)
TROPONIN I SERPL-MCNC: <.015 NG/ML — SIGNIFICANT CHANGE UP (ref 0.01–0.04)
WBC # BLD: 7.73 K/UL — SIGNIFICANT CHANGE UP (ref 3.8–10.5)
WBC # FLD AUTO: 7.73 K/UL — SIGNIFICANT CHANGE UP (ref 3.8–10.5)

## 2019-09-15 PROCEDURE — 99232 SBSQ HOSP IP/OBS MODERATE 35: CPT

## 2019-09-15 RX ORDER — NITROGLYCERIN 6.5 MG
0.4 CAPSULE, EXTENDED RELEASE ORAL ONCE
Refills: 0 | Status: COMPLETED | OUTPATIENT
Start: 2019-09-15 | End: 2019-09-15

## 2019-09-15 RX ORDER — FAMOTIDINE 10 MG/ML
20 INJECTION INTRAVENOUS ONCE
Refills: 0 | Status: COMPLETED | OUTPATIENT
Start: 2019-09-15 | End: 2019-09-15

## 2019-09-15 RX ADMIN — Medication 3 MILLILITER(S): at 13:10

## 2019-09-15 RX ADMIN — GABAPENTIN 100 MILLIGRAM(S): 400 CAPSULE ORAL at 05:54

## 2019-09-15 RX ADMIN — Medication 81 MILLIGRAM(S): at 12:43

## 2019-09-15 RX ADMIN — Medication 3 MILLILITER(S): at 07:12

## 2019-09-15 RX ADMIN — Medication 60 MILLIGRAM(S): at 05:54

## 2019-09-15 RX ADMIN — RIVAROXABAN 15 MILLIGRAM(S): KIT at 18:35

## 2019-09-15 RX ADMIN — Medication 250 MILLIGRAM(S): at 18:35

## 2019-09-15 RX ADMIN — CARVEDILOL PHOSPHATE 18.75 MILLIGRAM(S): 80 CAPSULE, EXTENDED RELEASE ORAL at 05:54

## 2019-09-15 RX ADMIN — Medication 3 MILLILITER(S): at 01:28

## 2019-09-15 RX ADMIN — GABAPENTIN 100 MILLIGRAM(S): 400 CAPSULE ORAL at 18:35

## 2019-09-15 RX ADMIN — CARVEDILOL PHOSPHATE 18.75 MILLIGRAM(S): 80 CAPSULE, EXTENDED RELEASE ORAL at 18:35

## 2019-09-15 RX ADMIN — Medication 0.4 MILLIGRAM(S): at 18:44

## 2019-09-15 RX ADMIN — ATORVASTATIN CALCIUM 40 MILLIGRAM(S): 80 TABLET, FILM COATED ORAL at 21:42

## 2019-09-15 RX ADMIN — FAMOTIDINE 20 MILLIGRAM(S): 10 INJECTION INTRAVENOUS at 23:51

## 2019-09-15 RX ADMIN — Medication 250 MILLIGRAM(S): at 05:54

## 2019-09-15 RX ADMIN — Medication 3 MILLILITER(S): at 20:09

## 2019-09-15 RX ADMIN — Medication 1 APPLICATION(S): at 12:44

## 2019-09-15 RX ADMIN — Medication 60 MILLIGRAM(S): at 18:35

## 2019-09-15 RX ADMIN — Medication 145 MILLIGRAM(S): at 12:43

## 2019-09-15 NOTE — PROGRESS NOTE ADULT - SUBJECTIVE AND OBJECTIVE BOX
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 Admit for cellulitis and decompensated heart failure.  c/p of chest pain, not radiating, mid chest, at rest.     MEDICATIONS  (STANDING):  ALBUTerol/ipratropium for Nebulization 3 milliLiter(s) Nebulizer every 6 hours  aspirin enteric coated 81 milliGRAM(s) Oral daily  atorvastatin 40 milliGRAM(s) Oral at bedtime  carvedilol 18.75 milliGRAM(s) Oral every 12 hours  cefuroxime   Tablet 250 milliGRAM(s) Oral every 12 hours  fenofibrate Tablet 145 milliGRAM(s) Oral daily  furosemide   Injectable 60 milliGRAM(s) IV Push two times a day  gabapentin 100 milliGRAM(s) Oral two times a day  influenza   Vaccine 0.5 milliLiter(s) IntraMuscular once  mineral oil/petrolatum Hydrophilic Ointment 1 Application(s) Topical daily  nitroglycerin     SubLingual 0.4 milliGRAM(s) SubLingual once  rivaroxaban 15 milliGRAM(s) Oral with dinner    MEDICATIONS  (PRN):      Allergies    No Known Allergies    Intolerances        REVIEW OF SYSTEMS:  CONSTITUTIONAL: No fever, weight loss, or fatigue  EYES: No eye pain, visual disturbances, or discharge  ENMT:  No difficulty hearing, tinnitus, vertigo; No sinus or throat pain  NECK: No pain or stiffness  BREASTS: No pain, masses, or nipple discharge  RESPIRATORY: No cough, wheezing, chills or hemoptysis; No shortness of breath  CARDIOVASCULAR: No chest pain, palpitations, dizziness, or leg swelling  GASTROINTESTINAL: No abdominal or epigastric pain. No nausea, vomiting, or hematemesis; No diarrhea or constipation. No melena or hematochezia.  GENITOURINARY: No dysuria, frequency, hematuria, or incontinence  NEUROLOGICAL: No headaches, memory loss, loss of strength, numbness, or tremors  SKIN: No itching, burning, rashes, or lesions   LYMPH NODES: No enlarged glands  ENDOCRINE: No heat or cold intolerance; No hair loss; No polydipsia or polyuria  MUSCULOSKELETAL: No joint pain or swelling; No muscle, back, or extremity pain  PSYCHIATRIC: No depression, anxiety, mood swings, or difficulty sleeping  HEME/LYMPH: No easy bruising, or bleeding gums  ALLERGY AND IMMUNOLOGIC: No hives or eczema    Vital Signs Last 24 Hrs  T(C): 36.4 (15 Sep 2019 14:25), Max: 36.7 (14 Sep 2019 21:22)  T(F): 97.6 (15 Sep 2019 14:25), Max: 98 (14 Sep 2019 21:22)  HR: 69 (15 Sep 2019 16:00) (59 - 74)  BP: 112/60 (15 Sep 2019 16:00) (112/54 - 130/75)  BP(mean): 73 (14 Sep 2019 21:22) (73 - 73)  RR: 18 (15 Sep 2019 14:25) (18 - 18)  SpO2: 98% (15 Sep 2019 14:25) (93% - 98%)    PHYSICAL EXAM:  GENERAL: NAD, well-groomed, well-developed  HEAD:  Atraumatic, Normocephalic  EYES: EOMI, PERRLA, conjunctiva and sclera clear  ENMT: No tonsillar erythema, exudates, or enlargement; Moist mucous membranes, Good dentition, No lesions  NECK: Supple, No JVD, Normal thyroid  NERVOUS SYSTEM:  Alert & Oriented X2, Good concentration; Motor Strength 5/5 B/L upper and lower extremities; DTRs 2+ intact and symmetric  CHEST/LUNG: Clear to auscultation bilaterally; No rales, rhonchi, wheezing, or rubs  HEART: Regular rate and rhythm; No murmurs, rubs, or gallops  ABDOMEN: Soft, Nontender, Nondistended; Bowel sounds present  EXTREMITIES:  2+ Peripheral Pulses, No clubbing, cyanosis, or edema  LYMPH: No lymphadenopathy noted  SKIN: No rashes or lesions    LABS:                        8.7    7.73  )-----------( 265      ( 15 Sep 2019 09:53 )             27.9     15 Sep 2019 09:53    139    |  95     |  37     ----------------------------<  98     3.4     |  37     |  1.40     Ca    9.6        15 Sep 2019 09:53  Phos  2.9       15 Sep 2019 09:53  Mg     1.8       15 Sep 2019 09:53      PT/INR - ( 14 Sep 2019 10:48 )   PT: 32.9 sec;   INR: 2.80 ratio             CAPILLARY BLOOD GLUCOSE          RADIOLOGY & ADDITIONAL TESTS:    Imaging Personally Reviewed:  [ ] YES  [ ] NO    Consultant(s) Notes Reviewed:  [ ] YES  [ ] NO    Care Discussed with Consultants/Other Providers [ ] YES  [ ] NO

## 2019-09-15 NOTE — PROGRESS NOTE ADULT - SUBJECTIVE AND OBJECTIVE BOX
[facility] -- Claudio Gilliland, Maryanne, Shavon, Eduardo Hutchins Savella  Office # [phone]      Follow Up:  SOB    Subjective/Observations: Patient seen and examined, Resting comfortably in bed, wife at bedside.  No complaints of chest pain, dyspnea, or palpitations reported. No signs of orthopnea or PND. + orthopnea but improved, cont  bipap at night, currently on nasal cannula.  Patient reports to feel " much better", wife agrees.    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  Ischemic bowel disease  Colon cancer: s/p chemo  Automatic implantable cardioverter-defibrillator in situ  Acute MI: s/p CABG x3  Hypertension  Congestive heart failure  Insomnia  Anemia, vitamin B12 deficiency  HTN (hypertension)  Ischemic colitis, enteritis, or enterocolitis: s/p partial colectomy  Vitamin B 12 deficiency  Prostate cancer: s/p radiation  Afib  CKD (chronic kidney disease)  MI (myocardial infarction)  PAD (peripheral artery disease): s/p stent, fem-fem bypass  CAD (coronary artery disease)  Peripheral Neuropathy  COPD (chronic obstructive pulmonary disease)  Colostomy care  Cataract extraction status of eye, right  S/P colostomy  S/P cardiac pacemaker procedure: AICD  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  S/P small bowel resection  S/P colon resection  S/P cholecystectomy  S/P bypass graft of extremity: Left to right femoral-femoral artery bypass graft 10 years ago  S/P CABG x 3      MEDICATIONS  (STANDING):  ALBUTerol/ipratropium for Nebulization 3 milliLiter(s) Nebulizer every 6 hours  aspirin enteric coated 81 milliGRAM(s) Oral daily  atorvastatin 40 milliGRAM(s) Oral at bedtime  carvedilol 18.75 milliGRAM(s) Oral every 12 hours  cefuroxime   Tablet 250 milliGRAM(s) Oral every 12 hours  fenofibrate Tablet 145 milliGRAM(s) Oral daily  furosemide   Injectable 60 milliGRAM(s) IV Push two times a day  gabapentin 100 milliGRAM(s) Oral two times a day  influenza   Vaccine 0.5 milliLiter(s) IntraMuscular once  mineral oil/petrolatum Hydrophilic Ointment 1 Application(s) Topical daily  rivaroxaban 15 milliGRAM(s) Oral with dinner    MEDICATIONS  (PRN):      Allergies    No Known Allergies    Intolerances            Vital Signs Last 24 Hrs  T(C): 36.6 (15 Sep 2019 05:20), Max: 36.7 (14 Sep 2019 13:37)  T(F): 97.9 (15 Sep 2019 05:20), Max: 98 (14 Sep 2019 13:37)  HR: 60 (15 Sep 2019 07:15) (59 - 66)  BP: 130/75 (15 Sep 2019 05:20) (108/59 - 130/75)  BP(mean): 73 (14 Sep 2019 21:22) (73 - 73)  RR: 18 (15 Sep 2019 05:20) (18 - 18)  SpO2: 98% (15 Sep 2019 05:20) (92% - 98%)    I&O's Summary    14 Sep 2019 07:01  -  15 Sep 2019 07:00  --------------------------------------------------------  IN: 240 mL / OUT: 1451 mL / NET: -1211 mL          PHYSICAL EXAM:  TELE: Vpaced 60-70's, afib underlying rhythm, 3 beats of Vtach last night   Constitutional: NAD, awake and alert, well-developed  HEENT: Moist Mucous Membranes, Anicteric  Pulmonary: Non-labored, breath sounds with exp wheezing and diminished bilaterally, No rales  Cardiovascular: IRRegular, S1 and S2, + murmur, no rubs, gallops or clicks  Gastrointestinal: Bowel Sounds present, soft, nontender.   Lymph: +2 peripheral edema. No lymphadenopathy.  Skin: No visible rashes or ulcers.  Psych:  Mood & affect appropriate    LABS: All Labs Reviewed:                        8.7    7.73  )-----------( 265      ( 15 Sep 2019 09:53 )             27.9                         8.6    7.84  )-----------( 249      ( 14 Sep 2019 10:48 )             26.9                         8.7    8.00  )-----------( 226      ( 13 Sep 2019 16:42 )             27.2     15 Sep 2019 09:53    139    |  95     |  37     ----------------------------<  98     3.4     |  37     |  1.40   14 Sep 2019 10:48    137    |  95     |  32     ----------------------------<  88     3.4     |  34     |  1.40     Ca    9.6        15 Sep 2019 09:53  Ca    9.3        14 Sep 2019 10:48  Phos  2.7       14 Sep 2019 10:48    TPro  6.6    /  Alb  2.9    /  TBili  0.5    /  DBili  x      /  AST  18     /  ALT  19     /  AlkPhos  35     14 Sep 2019 10:48    PT/INR - ( 14 Sep 2019 10:48 )   PT: 32.9 sec;   INR: 2.80 ratio         < from: 12 Lead ECG (09.10.19 @ 18:07) >    Ventricular Rate 61 BPM    Atrial Rate 61 BPM    P-R Interval 274 ms    QRS Duration 180 ms    Q-T Interval 528 ms    QTC Calculation(Bezet) 531 ms    R Axis -52 degrees    T Axis 110 degrees    Diagnosis Line Ventricular-paced rhythm    < end of copied text >      Echo:  < from: TTE Echo Doppler w/o Cont (09.11.19 @ 17:32) >    Conclusion:   Technically difficult study  Moderate left ventricular systolic dysfunction, estimated LVEF of 35-40%.   Left ventricular hypertrophy and enlargement is noted. Septal motion   consistent with conduction delay  Right ventricle is not well visualized. Devicewire is noted within the   right heart  Left atrial enlargement  Sclerotic trileaflet aortic valve with normal opening. Trace AI.   Moderate to severe MR   Trace TR.    Estimated PA systolic pressure of 49 mmHg. The IVC is dilated  No significant pericardial effusion.    < end of copied text > [facility] -- Claudio Gilliland, Maryanne, Shavon, Eduardo Hutchins Savella  Office # [phone]      Follow Up:  SOB    Subjective/Observations: Patient seen and examined, Resting comfortably in bed, wife at bedside.  No complaints of chest pain,  , or palpitations reported.  + orthopnea but improved, cont  bipap at night, currently on nasal cannula.  Patient reports to feel " much better", wife agrees.    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  Ischemic bowel disease  Colon cancer: s/p chemo  Automatic implantable cardioverter-defibrillator in situ  Acute MI: s/p CABG x3  Hypertension  Congestive heart failure  Insomnia  Anemia, vitamin B12 deficiency  HTN (hypertension)  Ischemic colitis, enteritis, or enterocolitis: s/p partial colectomy  Vitamin B 12 deficiency  Prostate cancer: s/p radiation  Afib  CKD (chronic kidney disease)  MI (myocardial infarction)  PAD (peripheral artery disease): s/p stent, fem-fem bypass  CAD (coronary artery disease)  Peripheral Neuropathy  COPD (chronic obstructive pulmonary disease)  Colostomy care  Cataract extraction status of eye, right  S/P colostomy  S/P cardiac pacemaker procedure: AICD  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  S/P small bowel resection  S/P colon resection  S/P cholecystectomy  S/P bypass graft of extremity: Left to right femoral-femoral artery bypass graft 10 years ago  S/P CABG x 3      MEDICATIONS  (STANDING):  ALBUTerol/ipratropium for Nebulization 3 milliLiter(s) Nebulizer every 6 hours  aspirin enteric coated 81 milliGRAM(s) Oral daily  atorvastatin 40 milliGRAM(s) Oral at bedtime  carvedilol 18.75 milliGRAM(s) Oral every 12 hours  cefuroxime   Tablet 250 milliGRAM(s) Oral every 12 hours  fenofibrate Tablet 145 milliGRAM(s) Oral daily  furosemide   Injectable 60 milliGRAM(s) IV Push two times a day  gabapentin 100 milliGRAM(s) Oral two times a day  influenza   Vaccine 0.5 milliLiter(s) IntraMuscular once  mineral oil/petrolatum Hydrophilic Ointment 1 Application(s) Topical daily  rivaroxaban 15 milliGRAM(s) Oral with dinner    MEDICATIONS  (PRN):      Allergies    No Known Allergies    Intolerances            Vital Signs Last 24 Hrs  T(C): 36.6 (15 Sep 2019 05:20), Max: 36.7 (14 Sep 2019 13:37)  T(F): 97.9 (15 Sep 2019 05:20), Max: 98 (14 Sep 2019 13:37)  HR: 60 (15 Sep 2019 07:15) (59 - 66)  BP: 130/75 (15 Sep 2019 05:20) (108/59 - 130/75)  BP(mean): 73 (14 Sep 2019 21:22) (73 - 73)  RR: 18 (15 Sep 2019 05:20) (18 - 18)  SpO2: 98% (15 Sep 2019 05:20) (92% - 98%)    I&O's Summary    14 Sep 2019 07:01  -  15 Sep 2019 07:00  --------------------------------------------------------  IN: 240 mL / OUT: 1451 mL / NET: -1211 mL          PHYSICAL EXAM:  TELE: Vpaced 60-70's, afib underlying rhythm, 3 beats of Vtach last night   Constitutional: NAD, awake and alert, well-developed  HEENT: Moist Mucous Membranes, Anicteric  Pulmonary: Non-labored, breath sounds with exp wheezing and diminished bilaterally, No rales  Cardiovascular: IRRegular, S1 and S2, + murmur, no rubs, gallops or clicks  Gastrointestinal: Bowel Sounds present, soft, nontender.   Lymph: +2 peripheral edema. No lymphadenopathy.  Skin: No visible rashes or ulcers.  Psych:  Mood & affect appropriate    LABS: All Labs Reviewed:                        8.7    7.73  )-----------( 265      ( 15 Sep 2019 09:53 )             27.9                         8.6    7.84  )-----------( 249      ( 14 Sep 2019 10:48 )             26.9                         8.7    8.00  )-----------( 226      ( 13 Sep 2019 16:42 )             27.2     15 Sep 2019 09:53    139    |  95     |  37     ----------------------------<  98     3.4     |  37     |  1.40   14 Sep 2019 10:48    137    |  95     |  32     ----------------------------<  88     3.4     |  34     |  1.40     Ca    9.6        15 Sep 2019 09:53  Ca    9.3        14 Sep 2019 10:48  Phos  2.7       14 Sep 2019 10:48    TPro  6.6    /  Alb  2.9    /  TBili  0.5    /  DBili  x      /  AST  18     /  ALT  19     /  AlkPhos  35     14 Sep 2019 10:48    PT/INR - ( 14 Sep 2019 10:48 )   PT: 32.9 sec;   INR: 2.80 ratio         < from: 12 Lead ECG (09.10.19 @ 18:07) >    Ventricular Rate 61 BPM    Atrial Rate 61 BPM    P-R Interval 274 ms    QRS Duration 180 ms    Q-T Interval 528 ms    QTC Calculation(Bezet) 531 ms    R Axis -52 degrees    T Axis 110 degrees    Diagnosis Line Ventricular-paced rhythm    < end of copied text >      Echo:  < from: TTE Echo Doppler w/o Cont (09.11.19 @ 17:32) >    Conclusion:   Technically difficult study  Moderate left ventricular systolic dysfunction, estimated LVEF of 35-40%.   Left ventricular hypertrophy and enlargement is noted. Septal motion   consistent with conduction delay  Right ventricle is not well visualized. Devicewire is noted within the   right heart  Left atrial enlargement  Sclerotic trileaflet aortic valve with normal opening. Trace AI.   Moderate to severe MR   Trace TR.    Estimated PA systolic pressure of 49 mmHg. The IVC is dilated  No significant pericardial effusion.    < end of copied text >

## 2019-09-15 NOTE — PROVIDER CONTACT NOTE (CHANGE IN STATUS NOTIFICATION) - SITUATION
Patient has skin tear on bridge of nose. Patient states he did not have this on his last night and it is from his bipap

## 2019-09-15 NOTE — PROGRESS NOTE ADULT - SUBJECTIVE AND OBJECTIVE BOX
Patient was examined Chart reviewed Detailed note will be inserted below after going over latest data Meanwhile please refer to my previous notes for Pulmonary/Critical Care assessment recommendations EMIGDIO REEVES [facility] [phone]   1934 DOA 9/10/2019 DR HOLLY ZIEGLER   ALLERGY     nka    CONTACT     sp Esperanza RODRIGUEZ       Initial evaluation/Pulmonary Critical Care consultation requested on 2019   by Dr Ziegler  from Dr Singh   Patient examined chart reviewed    HOSPITAL ADMISSION   PATIENT CAME  FROM (if information available)        TYPE OF VISIT      Subsequent Pulmonary followup     REASON FOR VISIT  PLEASE SEE PROBLEM LIST/ASSESSMENT AND RECOMMENDATIONS     PATIENT EMIGDIO REEVES [facility] [phone]   1934 DOA 9/10/2019 DR HOLLY ZIEGLER     VITALS/LABS       9/15/2019 afeb 59 119/58 18 98%   9/15/2019 5a 14/6/.4   9/15/2019 W 7.7 Hb 8.7 Plt 265 Na 139 K 3.4 CO2 37 Cr 1.4   2019 CXR pulm edema sm bl effs    REVIEW OF SYMPTOMS     NOTE Noteworthy changes  if any  in ROS and PE are also entered  in note  below      Able to give ROS  Yes     RELIABLE No   CONSTITUTIONAL Weakness Yes  Chills No Vision changes No  ENDOCRINE No unexplained hair loss No heat or cold intolerance    ALLERGY No hives  Sore throat No   RESP Coughing blood no  Shortness of breath YES   NEURO No Headache  Confusion Pain neck No   CARDIAC No Chest pain No Palpitations   GI No Pain abdomen NO   Vomiting NO     PHYSICAL EXAM    HEENT Unremarkable PERRLA atraumatic   RESP Fair air entry EXP prolonged    Harsh breath sound Resp distres mild   CARDIAC S1 S2 No S3     NO JVD    ABDOMEN SOFT BS PRESENT NOT DISTENDED No hepatosplenomegaly PEDAL EDEMA present No calf tenderness  NO rash   GENERAL Not TOXIC looking    PATIENT EMIGDIO REEVES [facility] [phone]   1934 DOA 9/10/2019 DR HOLLY ZIEGLER                             Pt description                          85 m 1 ppd smoker quit age 40 retd [facility]  Has home O2  PMH Colon CA 2004 surgery chemo l colostomy ischemic colitis  AICD   CABG 2003 [place] to LAD DVG to OM1 /PDA   c STENOSIS 50-79% lica A fib on Xarelto PVD CKD 3 Prostate ca HO urine retn Does self catheterization  sev years PVD Angioplasty R SFA sp rle trombosis  amputatn 1st and 2nd metattarsal  HO lung nodule PET [facility] 2018   (Remote colon ca 2004 Prostate ca 2005)   Mild but not focal FDG uptake corresponding to a RLL nodule SUV 1.3 1 x1 cm (prev 1.4x 1 )   2018  109% FEV1 238 88% FEV1% 56% Mild obstrn was admitted 9/10/2019 with weakness productive cough and nonhealing wound and managed as cellulitis and decomepnsated heart failure  Pulmonary consulted 2019 as he has pl effs and is short of breath

## 2019-09-15 NOTE — PROGRESS NOTE ADULT - ASSESSMENT
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 Admit for cellulitis and decompensated heart failure.    Chronic urinary retention: Neville inserted     Acute respiratory hypercapnic failure: 2/2 acute on chronic CHF exacerbation, started on BIPAP and given IV lasix. improved.     Acute metabolic encephalopathy: 2/2 hypercapnia respiratory failure, resolved.     Code Status: family and POA decided for DNR, DNI. MOLST reviewed and spoke to family at least for 30 min regarding MOSLT review and code status, they understood and agreed with above plan..       Chest pain, atypical, check EKG, Trop times 2, Nitro S/L stat.

## 2019-09-15 NOTE — PROGRESS NOTE ADULT - ASSESSMENT
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 Keep K>4.0 and Mg>2.0.  K 3.4 today , please replete   Continue with ASA Coreg Lipitor Fenofibrate, no ace arb given CKD    ID LE Cellultiits  Follow up Blood cultures  Antibiotics as per primary team     COPD  Supplemental O2  nebulizers PRn    Anemia  Anemia work up as per primary team  Keep hgb > 8 given CAD history   IF transfusing given split units, lasix in between and frequent lung checks     Afib  Rate controlled on Coreg  Thromboembolism ppx on Xarelto PO    HLD  Continue with Lipitor    Family requesting palliative consultation  PT evaluation- obtain RA o2 sat   Fall precautions    Yee Yi NP  Cardiology 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 Keep K>4.0 and Mg>2.0.  K 3.4 today , please replete   Continue with ASA Coreg Lipitor Fenofibrate, no ace arb given CKD    ID LE Cellultiits  Follow up Blood cultures  Antibiotics as per primary team     COPD  Supplemental O2  nebulizers PRn    Anemia  Anemia work up as per primary team  Keep hgb > 8 given CAD history   IF transfusing given split units, lasix in between and frequent lung checks     Afib  Rate controlled on Coreg  Thromboembolism ppx on Xarelto PO    HLD  Continue with Lipitor    Family requesting palliative consultation  PT evaluation- obtain RA o2 sat   Fall precautions    Yee Yi NP  Cardiology

## 2019-09-15 NOTE — PROGRESS NOTE ADULT - ASSESSMENT
PATIENT EMIGDIO REEVES [facility] [phone]   1934 DOA 9/10/2019 DR HOLLY LEE                            PULMONARY/CRITICAL CARE ASSESSMENT/RECOMMENDATIONS LIST                   RESP GAS EXCHANGE   Patient seems to have compensated chronic resp acidosis likely from copd (was smoker retd )   May use bpap as needed if he has increased work breathing   Monitor pulse ox prn sob and adjust O2 to keep po 90-95%  Has been using noct bpap (9/15)  PLEURAL EFFUSION   Is likely sec to CHF Doubt that it is contributing significantly to dyspnea and even if we drained it it will likely recur as it is probably from his s CHF Will observe at this point    COPD   2018 rodolfo showed mod obstructive pattern   Continue duoneb Under control   LUNG NODULE   Pt has new rul nodule He will need cts eval  He should come see me in office [phone] within 2 w after discharge  A fib   On xarelto   S CHF   9/10 cxr chf   echo ef 35% pasp 49 dilated ivc lae   Lasix 60.2 ()   Cont vRx  INFECTION   Cellulitis On ceftin    cns blod culture () likely contaminant                       TIME SPENT Over 25 minutes aggregate care time spent on encounter; activities included   direct pt care, counseling and/or coordinating care reviewing notes, lab data/ imaging , discussion with multidisciplinary team/ pt /family. Risks, benefits, alternatives  discussed in detail.

## 2019-09-15 NOTE — PROGRESS NOTE ADULT - PROBLEM SELECTOR PLAN 2
Acute, s/p traumatic injury requiring 10 sutures   -Left medial LE warm, tender and erythematous w/ central eschar & known vascular disease on this leg  - will transition to oral ceftin 250mg bid;  -Wound care consulted

## 2019-09-15 NOTE — PROGRESS NOTE ADULT - PROBLEM SELECTOR PLAN 3
Acute on chronic systolic CHF exacerbation; respiratory distress: continues to have good output  -BNP 8707  -echo shows stable EF at 30-35%  -Continue Lasix 60mg IV BID   - Cardio, consulted recs appreciated  -Strict I&Os, daily weights, HOB elevated, negative balance ~ 7 lit.

## 2019-09-16 LAB — TROPONIN I SERPL-MCNC: 0.02 NG/ML — SIGNIFICANT CHANGE UP (ref 0.01–0.04)

## 2019-09-16 PROCEDURE — 99232 SBSQ HOSP IP/OBS MODERATE 35: CPT

## 2019-09-16 PROCEDURE — 93010 ELECTROCARDIOGRAM REPORT: CPT

## 2019-09-16 PROCEDURE — 99232 SBSQ HOSP IP/OBS MODERATE 35: CPT | Mod: GC

## 2019-09-16 RX ORDER — POTASSIUM CHLORIDE 20 MEQ
10 PACKET (EA) ORAL
Refills: 0 | Status: COMPLETED | OUTPATIENT
Start: 2019-09-16 | End: 2019-09-16

## 2019-09-16 RX ORDER — POTASSIUM CHLORIDE 20 MEQ
10 PACKET (EA) ORAL DAILY
Refills: 0 | Status: DISCONTINUED | OUTPATIENT
Start: 2019-09-16 | End: 2019-09-20

## 2019-09-16 RX ADMIN — Medication 60 MILLIGRAM(S): at 05:48

## 2019-09-16 RX ADMIN — Medication 100 MILLIEQUIVALENT(S): at 22:33

## 2019-09-16 RX ADMIN — Medication 100 MILLIEQUIVALENT(S): at 20:32

## 2019-09-16 RX ADMIN — Medication 250 MILLIGRAM(S): at 05:48

## 2019-09-16 RX ADMIN — Medication 1 APPLICATION(S): at 12:10

## 2019-09-16 RX ADMIN — Medication 145 MILLIGRAM(S): at 12:01

## 2019-09-16 RX ADMIN — Medication 3 MILLILITER(S): at 13:33

## 2019-09-16 RX ADMIN — GABAPENTIN 100 MILLIGRAM(S): 400 CAPSULE ORAL at 05:48

## 2019-09-16 RX ADMIN — Medication 100 MILLIEQUIVALENT(S): at 18:28

## 2019-09-16 RX ADMIN — Medication 81 MILLIGRAM(S): at 12:01

## 2019-09-16 RX ADMIN — Medication 3 MILLILITER(S): at 07:30

## 2019-09-16 RX ADMIN — Medication 60 MILLIGRAM(S): at 18:40

## 2019-09-16 RX ADMIN — Medication 3 MILLILITER(S): at 19:59

## 2019-09-16 RX ADMIN — Medication 3 MILLILITER(S): at 01:03

## 2019-09-16 RX ADMIN — CARVEDILOL PHOSPHATE 18.75 MILLIGRAM(S): 80 CAPSULE, EXTENDED RELEASE ORAL at 05:48

## 2019-09-16 NOTE — PROGRESS NOTE ADULT - ASSESSMENT
PATIENT EMIGDIO REEVES [facility] P [phone]   1934 DOA 9/10/2019 DR HOLLY LEE                            PULMONARY/CRITICAL CARE ASSESSMENT/RECOMMENDATIONS LIST                   RESP GAS EXCHANGE   2019 6a bpap .4 740/53/126  Patient seems to have compensated chronic resp acidosis likely from copd (was smoker retd )   May use bpap as needed if he has increased work breathing   Monitor pulse ox prn sob and adjust O2 to keep po 90-95%  Has been using noct bpap (9/15)  Will benefit from home bpap after discharge [facility] to [facility] use bpap at night and duirng day as needed   PLEURAL EFFUSION   Is likely sec to CHF Doubt that it is contributing significantly to dyspnea and even if we drained it it will likely recur as it is probably from his s CHF Will observe at this point    COPD   2018 rodolfo showed mod obstructive pattern   Continue duoneb Under control   LUNG NODULE   Pt has new rul nodule He will need cts eval  He should come see me in office [phone] within 2 w after discharge  A fib   On xarelto   S [facility]   9/10 cxr [facility]   echo ef 35% pasp 49 dilated ivc lae   Lasix 60.2 ()   Cont Rx  INFECTION   Cellulitis On ceftin    cns blod culture () likely contaminant                       TIME SPENT Over 25 minutes aggregate care time spent on encounter; activities included   direct pt care, counseling and/or coordinating care reviewing notes, lab data/ imaging , discussion with multidisciplinary team/ pt /family. Risks, benefits, alternatives  discussed in detail.

## 2019-09-16 NOTE — PROGRESS NOTE ADULT - ASSESSMENT
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, osteoarthritis, peripheral neuropathy presents admitted for LLE cellulitis, acute on chronic systolic CHF    CV : ACute on chronic systolic CHF, CAD MI, AICD   Remains hypervolemic on exam despite negative 2191 cc 24 hours on lasix 60mg IV BID   keep strict intake and output, 1.2 liter fluid restriction   Follow up am labs and supplement to keep K > 4.0 and Mg >2.0  If Co2 continues to increase check ABG if ph > 7.45 dose Diamox   Continue with ASA Coreg Lipitor Fenofibrate, no ace arb given CKD    ID LE Cellulitis  Follow up Blood cultures  Antibiotics as per primary team     COPD  Supplemental O2  nebulizers PRn    Anemia  Anemia work up as per primary team  Keep hgb > 8 given CAD history   IF transfusing given split units, lasix in between and frequent lung checks     Afib  Rate controlled on Coreg  Thromboembolism ppx on Xarelto PO    HLD  Continue with Lipitor    CKD  Baseline cr 1.5  Daily renal profile     Family requesting palliative consultation for advanced CHF  PT   Fall precautions    Jacqueline Dennis FNP-C  Cardiology NP  [facility] [phone] 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, osteoarthritis, peripheral neuropathy presents admitted for LLE cellulitis, acute on chronic systolic CHF    CV : ACute on chronic systolic CHF, CAD MI, AICD   Remains hypervolemic on exam despite negative 2191 cc 24 hours on lasix 60mg IV BID   keep strict intake and output, 1.2 liter fluid restriction   Hypokalemia noted- supplement to keep K > 4.0 and Mg >2.0  Check daily renal profile- If Co2 continues to increase check ABG if ph > 7.45 dose Diamox   Continue with ASA Coreg Lipitor Fenofibrate, no ace arb given CKD    ID LE Cellulitis  Follow up Blood cultures  Antibiotics as per primary team     COPD  Supplemental O2  nebulizers PRn    Anemia  Anemia work up as per primary team  Keep hgb > 8 given CAD history   IF transfusing given split units, lasix in between and frequent lung checks     Afib  Rate controlled on Coreg  Thromboembolism ppx on Xarelto PO    HLD  Continue with Lipitor    CKD  Baseline cr 1.5  Daily renal profile     Family requesting palliative consultation for advanced CHF  PT   Fall precautions    Jacqueline Dennis FNP-C  Cardiology NP  [facility] [phone]

## 2019-09-16 NOTE — PROGRESS NOTE ADULT - ASSESSMENT
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 Admit for cellulitis and decompensated heart failure.    Chronic urinary retention: Neville inserted     Acute respiratory hypercapnic failure: 2/2 acute on chronic CHF exacerbation, started on BIPAP and given IV lasix. improved.     Acute metabolic encephalopathy: 2/2 hypercapnia respiratory failure, resolved.     Code Status: family and POA decided for DNR, DNI. MOLST reviewed and spoke to family at least for 30 min regarding MOSLT review and code status, they understood and agreed with above plan..       Chest pain, atypical, check EKG, Trop times 2, Nitro S/L stat.

## 2019-09-16 NOTE — PROGRESS NOTE ADULT - SUBJECTIVE AND OBJECTIVE BOX
Patient was examined Chart reviewed Detailed note will be inserted below after going over latest data Meanwhile please refer to my previous notes for Pulmonary/Critical Care assessment recommendations EMIGDIO REEVES [facility] P [phone]   1934 DOA 9/10/2019 DR HOLLY ZIEGLER   ALLERGY     nka    CONTACT     sp Esperanza RODRIGUEZ       Initial evaluation/Pulmonary Critical Care consultation requested on 2019   by Dr Ziegler  from Dr Singh   Patient examined chart reviewed    HOSPITAL ADMISSION   PATIENT CAME  FROM (if information available)        TYPE OF VISIT      Subsequent Pulmonary followup     REASON FOR VISIT  PLEASE SEE PROBLEM LIST/ASSESSMENT AND RECOMMENDATIONS     PATIENT EMIGDIO REEVES [facility] P [phone]   1934 DOA 9/10/2019 DR HOLLY ZIEGLER     VITALS/LABS       2019 afeb 60 120/70 18 95%   2019 Na 140 K 3.2 CO2 36 Cr 1.2     REVIEW OF SYMPTOMS     NOTE Noteworthy changes  if any  in ROS and PE are also entered  in note  below      Able to give ROS  Yes     RELIABLE No   CONSTITUTIONAL Weakness Yes  Chills No Vision changes No  ENDOCRINE No unexplained hair loss No heat or cold intolerance    ALLERGY No hives  Sore throat No   RESP Coughing blood no  Shortness of breath YES   NEURO No Headache  Confusion Pain neck No   CARDIAC No Chest pain No Palpitations   GI No Pain abdomen NO   Vomiting NO     PHYSICAL EXAM    HEENT Unremarkable PERRLA atraumatic   RESP Fair air entry EXP prolonged    Harsh breath sound Resp distres mild   CARDIAC S1 S2 No S3     NO JVD    ABDOMEN SOFT BS PRESENT NOT DISTENDED No hepatosplenomegaly PEDAL EDEMA present No calf tenderness  NO rash   GENERAL Not TOXIC looking    PATIENT EMIGDIO REEVES [facility] P [phone]   1934 DOA 9/10/2019 DR HOLLY ZIEGLER                             Pt description                          85 m 1 ppd smoker quit age 40 retd [facility]  Has home O2  PMH Colon CA 2004 surgery chemo l colostomy ischemic colitis  AICD   CABG 2003 [place] to LAD DVG to OM1 /PDA   c STENOSIS 50-79% lica A fib on Xarelto PVD CKD 3 Prostate ca HO urine retn Does self catheterization  sev years PVD Angioplasty R SFA sp rle trombosis  amputatn 1st and 2nd metattarsal  HO lung nodule PET Zwanger 2018   (Remote colon ca 2004 Prostate ca )   Mild but not focal FDG uptake corresponding to a RLL nodule SUV 1.3 1 x1 cm (prev 1.4x 1 )   2018  109% FEV1 238 88% FEV1% 56% Mild obstrn was admitted 9/10/2019 with weakness productive cough and nonhealing wound and managed as cellulitis and decomepnsated heart failure  Pulmonary consulted 2019 as he has pl effs and is short of breath

## 2019-09-16 NOTE — PROGRESS NOTE ADULT - PROBLEM SELECTOR PLAN 3
Acute on chronic systolic CHF exacerbation; respiratory distress: continues to have good output  -BNP 8707  -echo shows stable EF at 30-35%  -Continue Lasix 60mg IV BID: will add standing Potassium 10 meq daily  - Cardio, consulted recs appreciated  -Strict I&Os, daily weights, HOB elevated, negative balance ~ 7 lit.

## 2019-09-16 NOTE — PROGRESS NOTE ADULT - PROBLEM SELECTOR PLAN 1
- p/w generalized weakness. likely multifactorial. acute infection-cellulitis- decompensated heart failure  - cellulitis of lower extremity, transition to oral abx. trend temp/leukocytosis, am labs.  - Blood cx coag negative staph, likely contaminate  - diarrhea - resolved: well formed stool in Colostomy bag;  - will monitor lytes/renal function and volume status closely.   - PT eval

## 2019-09-16 NOTE — PROGRESS NOTE ADULT - SUBJECTIVE AND OBJECTIVE BOX
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 Pt also admits to wheelchair induced injury to the left LE resulting in 10 stiches on 9/4/2019 now pt stating area is cellulitic but it has not been treated outpatient. Per pt, [facility] which resulted in worsening b/l LE edema.    INTERVAL EVENTS:  Patient seen and examined at bedside, reports that he feels much better.  He denies CP, heart palpitations, n/v, he continues to make good urine.    REVIEW OF SYSTEMS:    CONSTITUTIONAL: No weakness, fevers or chills  EYES/ENT: No visual changes, no throat pain   RESPIRATORY: + SOB, cough, wheezing; No hemoptysis;   CARDIOVASCULAR: No chest pain or palpitations  GASTROINTESTINAL: No abdominal pain, nausea, vomiting, or hematemesis; No diarrhea or constipation. No melena or hematochezia.  GENITOURINARY: No dysuria, frequency or hematuria  NEUROLOGICAL: No dizziness, numbness, or weakness  SKIN: No itching, burning, rashes, or lesions   All other review of systems is negative unless indicated above.    VITAL SIGNS:  T(C): 36.7 (09-16-19 @ 05:22), Max: 36.7 (09-16-19 @ 05:22)  HR: 60 (09-16-19 @ 13:35) (59 - 79)  BP: 124/70 (09-16-19 @ 05:22) (112/60 - 124/70)  RR: 18 (09-16-19 @ 05:22) (18 - 18)  SpO2: 96% (09-16-19 @ 13:35) (90% - 98%)    PHYSICAL EXAM:     GENERAL: no acute distress  HEENT: NC/AT, EOMI, neck supple, MMM  RESPIRATORY: diffuse expiratory wheeze w/ rhonchi  CARDIOVASCULAR: RRR, no murmurs, gallops, rubs  ABDOMINAL: soft, non-tender, non-distended, positive bowel sounds  EXTREMITIES: no clubbing, cyanosis, or edema  NEUROLOGICAL: alert and oriented x 3, non-focal  SKIN: no rashes or lesions: healing eschar w/o surrounding erythema  MUSCULOSKELETAL: no gross joint deformity                          8.7    7.73  )-----------( 265      ( 15 Sep 2019 09:53 )             27.9     09-16    140  |  96  |  38<H>  ----------------------------<  100<H>  3.2<L>   |  36<H>  |  1.20    Ca    9.2      16 Sep 2019 09:13  Phos  2.7     09-16  Mg     1.8     09-15    TPro  x   /  Alb  2.8<L>  /  TBili  x   /  DBili  x   /  AST  x   /  ALT  x   /  AlkPhos  x   09-16    MEDICATIONS  (STANDING):  ALBUTerol/ipratropium for Nebulization 3 milliLiter(s) Nebulizer every 6 hours  aspirin enteric coated 81 milliGRAM(s) Oral daily  atorvastatin 40 milliGRAM(s) Oral at bedtime  carvedilol 18.75 milliGRAM(s) Oral every 12 hours  cefuroxime   Tablet 250 milliGRAM(s) Oral every 12 hours  fenofibrate Tablet 145 milliGRAM(s) Oral daily  furosemide   Injectable 60 milliGRAM(s) IV Push two times a day  gabapentin 100 milliGRAM(s) Oral two times a day  influenza   Vaccine 0.5 milliLiter(s) IntraMuscular once  mineral oil/petrolatum Hydrophilic Ointment 1 Application(s) Topical daily  potassium chloride    Tablet ER 10 milliEquivalent(s) Oral daily  potassium chloride  10 mEq/100 mL IVPB 10 milliEquivalent(s) IV Intermittent every 1 hour  rivaroxaban 15 milliGRAM(s) Oral with dinner

## 2019-09-16 NOTE — PROGRESS NOTE ADULT - SUBJECTIVE AND OBJECTIVE BOX
[facility] -- Claudio Gilliland, Shavon Madden Pannella, Patel, Savella  Office # [phone]      Follow Up:  shortness of breath     Subjective/Observations:   Seen at bedside + orthopnea   denies chest pain dizziness palpitations   No fever chills n/v/d      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  Ischemic bowel disease  Colon cancer: s/p chemo  Automatic implantable cardioverter-defibrillator in situ  Acute MI: s/p CABG x3  Hypertension  Congestive heart failure  Insomnia  Anemia, vitamin B12 deficiency  HTN (hypertension)  Ischemic colitis, enteritis, or enterocolitis: s/p partial colectomy  Vitamin B 12 deficiency  Prostate cancer: s/p radiation  Afib  CKD (chronic kidney disease)  MI (myocardial infarction)  PAD (peripheral artery disease): s/p stent, fem-fem bypass  CAD (coronary artery disease)  Peripheral Neuropathy  COPD (chronic obstructive pulmonary disease)  Colostomy care  Cataract extraction status of eye, right  S/P colostomy  S/P cardiac pacemaker procedure: AICD  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  S/P small bowel resection  S/P colon resection  S/P cholecystectomy  S/P bypass graft of extremity: Left to right femoral-femoral artery bypass graft 10 years ago  S/P CABG x 3      MEDICATIONS  (STANDING):  ALBUTerol/ipratropium for Nebulization 3 milliLiter(s) Nebulizer every 6 hours  aspirin enteric coated 81 milliGRAM(s) Oral daily  atorvastatin 40 milliGRAM(s) Oral at bedtime  carvedilol 18.75 milliGRAM(s) Oral every 12 hours  cefuroxime   Tablet 250 milliGRAM(s) Oral every 12 hours  fenofibrate Tablet 145 milliGRAM(s) Oral daily  furosemide   Injectable 60 milliGRAM(s) IV Push two times a day  gabapentin 100 milliGRAM(s) Oral two times a day  influenza   Vaccine 0.5 milliLiter(s) IntraMuscular once  mineral oil/petrolatum Hydrophilic Ointment 1 Application(s) Topical daily  rivaroxaban 15 milliGRAM(s) Oral with dinner    MEDICATIONS  (PRN):      Allergies    No Known Allergies    Intolerances        Vital Signs Last 24 Hrs  T(C): 36.7 (16 Sep 2019 05:22), Max: 36.7 (16 Sep 2019 05:22)  T(F): 98.1 (16 Sep 2019 05:22), Max: 98.1 (16 Sep 2019 05:22)  HR: 60 (16 Sep 2019 07:35) (59 - 79)  BP: 124/70 (16 Sep 2019 05:22) (112/60 - 124/70)  BP(mean): 88 (16 Sep 2019 05:22) (88 - 88)  RR: 18 (16 Sep 2019 05:22) (18 - 18)  SpO2: 97% (16 Sep 2019 07:35) (95% - 98%)    I&O's Summary    15 Sep 2019 07:01  -  16 Sep 2019 07:00  --------------------------------------------------------  IN: 460 mL / OUT: 2651 mL / NET: -2191 mL          PHYSICAL EXAM:  TELE: not on tele   Constitutional: NAD, awake and alert, well-developed  HEENT: Moist Mucous Membranes, Anicteric  Pulmonary :Diffuse expiratory wheeze, bibasilar rales   Cardiovascular: Regular, S1 and S2 nl, No murmurs, rubs, gallops or clicks  Gastrointestinal: Bowel Sounds present, soft, nontender.   Lymph: + pitting edema bilateral LE   Skin: No visible rashes or ulcers.  Psych:  Mood & affect appropriate    LABS: All Labs Reviewed:                        8.7    7.73  )-----------( 265      ( 15 Sep 2019 09:53 )             27.9                         8.6    7.84  )-----------( 249      ( 14 Sep 2019 10:48 )             26.9                         8.7    8.00  )-----------( 226      ( 13 Sep 2019 16:42 )             27.2     15 Sep 2019 09:53    139    |  95     |  37     ----------------------------<  98     3.4     |  37     |  1.40   14 Sep 2019 10:48    137    |  95     |  32     ----------------------------<  88     3.4     |  34     |  1.40     Ca    9.6        15 Sep 2019 09:53  Ca    9.3        14 Sep 2019 10:48  Phos  2.9       15 Sep 2019 09:53  Phos  2.7       14 Sep 2019 10:48  Mg     1.8       15 Sep 2019 09:53    TPro  6.6    /  Alb  2.9    /  TBili  0.5    /  DBili  x      /  AST  18     /  ALT  19     /  AlkPhos  35     14 Sep 2019 10:48    PT/INR - ( 14 Sep 2019 10:48 )   PT: 32.9 sec;   INR: 2.80 ratio           CARDIAC MARKERS ( 15 Sep 2019 19:45 )  <.015 ng/mL / x     / x     / x     / x             ECG:  < from: 12 Lead ECG (09.10.19 @ 18:07) >    Ventricular Rate 61 BPM    Atrial Rate 61 BPM    P-R Interval 274 ms    QRS Duration 180 ms    Q-T Interval 528 ms    QTC Calculation(Bezet) 531 ms    R Axis -52 degrees    T Axis 110 degrees    Diagnosis Line Ventricular-paced rhythm    < end of copied text >      Echo:    < from: TTE Echo Doppler w/o Cont (09.11.19 @ 17:32) >  Conclusion:   Technically difficult study  Moderate left ventricular systolic dysfunction, estimated LVEF of 35-40%.   Left ventricular hypertrophy and enlargement is noted. Septal motion   consistent with conduction delay  Right ventricle is not well visualized. Devicewire is noted within the   right heart  Left atrial enlargement  Sclerotic trileaflet aortic valve with normal opening. Trace AI.   Moderate to severe MR   Trace TR.    Estimated PA systolic pressure of 49 mmHg. The IVC is dilated  No significant pericardial effusion.    < end of copied text >      Radiology:  < from: Xray Chest 1 View- PORTABLE-Routine (09.14.19 @ 07:24) >  The cardiac silhouette is enlarged. Pulmonary edema. Small bilateral   pleural effusions.    IMPRESSION: Pulmonary edema and small bilateral pleural effusions with   slight interval improvement    < end of copied text >               Stefan [facility]   Cardiology

## 2019-09-17 LAB
ANION GAP SERPL CALC-SCNC: 5 MMOL/L — SIGNIFICANT CHANGE UP (ref 5–17)
BASE EXCESS BLDA CALC-SCNC: 16.4 MMOL/L — HIGH (ref -2–2)
BLOOD GAS COMMENTS ARTERIAL: SIGNIFICANT CHANGE UP
BLOOD GAS COMMENTS ARTERIAL: SIGNIFICANT CHANGE UP
BUN SERPL-MCNC: 37 MG/DL — HIGH (ref 7–23)
CALCIUM SERPL-MCNC: 9.5 MG/DL — SIGNIFICANT CHANGE UP (ref 8.5–10.1)
CHLORIDE SERPL-SCNC: 96 MMOL/L — SIGNIFICANT CHANGE UP (ref 96–108)
CO2 SERPL-SCNC: 41 MMOL/L — HIGH (ref 22–31)
CREAT SERPL-MCNC: 1.2 MG/DL — SIGNIFICANT CHANGE UP (ref 0.5–1.3)
GLUCOSE SERPL-MCNC: 88 MG/DL — SIGNIFICANT CHANGE UP (ref 70–99)
HCO3 BLDA-SCNC: 39 MMOL/L — HIGH (ref 23–27)
HCT VFR BLD CALC: 28.6 % — LOW (ref 39–50)
HGB BLD-MCNC: 8.7 G/DL — LOW (ref 13–17)
HOROWITZ INDEX BLDA+IHG-RTO: 40 — SIGNIFICANT CHANGE UP
MCHC RBC-ENTMCNC: 30 PG — SIGNIFICANT CHANGE UP (ref 27–34)
MCHC RBC-ENTMCNC: 30.4 GM/DL — LOW (ref 32–36)
MCV RBC AUTO: 98.6 FL — SIGNIFICANT CHANGE UP (ref 80–100)
NRBC # BLD: 0 /100 WBCS — SIGNIFICANT CHANGE UP (ref 0–0)
PCO2 BLDA: 62 MMHG — HIGH (ref 32–46)
PH BLDA: 7.44 — SIGNIFICANT CHANGE UP (ref 7.35–7.45)
PLATELET # BLD AUTO: 302 K/UL — SIGNIFICANT CHANGE UP (ref 150–400)
PO2 BLDA: 84 MMHG — SIGNIFICANT CHANGE UP (ref 74–108)
POTASSIUM SERPL-MCNC: 3.3 MMOL/L — LOW (ref 3.5–5.3)
POTASSIUM SERPL-SCNC: 3.3 MMOL/L — LOW (ref 3.5–5.3)
RBC # BLD: 2.9 M/UL — LOW (ref 4.2–5.8)
RBC # FLD: 15.4 % — HIGH (ref 10.3–14.5)
SAO2 % BLDA: 96 % — SIGNIFICANT CHANGE UP (ref 92–96)
SODIUM SERPL-SCNC: 142 MMOL/L — SIGNIFICANT CHANGE UP (ref 135–145)
WBC # BLD: 8.78 K/UL — SIGNIFICANT CHANGE UP (ref 3.8–10.5)
WBC # FLD AUTO: 8.78 K/UL — SIGNIFICANT CHANGE UP (ref 3.8–10.5)

## 2019-09-17 PROCEDURE — 99232 SBSQ HOSP IP/OBS MODERATE 35: CPT | Mod: GC

## 2019-09-17 PROCEDURE — 99233 SBSQ HOSP IP/OBS HIGH 50: CPT

## 2019-09-17 RX ORDER — POTASSIUM CHLORIDE 20 MEQ
40 PACKET (EA) ORAL EVERY 4 HOURS
Refills: 0 | Status: COMPLETED | OUTPATIENT
Start: 2019-09-17 | End: 2019-09-17

## 2019-09-17 RX ADMIN — CARVEDILOL PHOSPHATE 18.75 MILLIGRAM(S): 80 CAPSULE, EXTENDED RELEASE ORAL at 17:32

## 2019-09-17 RX ADMIN — Medication 81 MILLIGRAM(S): at 12:27

## 2019-09-17 RX ADMIN — Medication 60 MILLIGRAM(S): at 17:31

## 2019-09-17 RX ADMIN — ATORVASTATIN CALCIUM 40 MILLIGRAM(S): 80 TABLET, FILM COATED ORAL at 21:02

## 2019-09-17 RX ADMIN — Medication 145 MILLIGRAM(S): at 12:27

## 2019-09-17 RX ADMIN — Medication 3 MILLILITER(S): at 14:17

## 2019-09-17 RX ADMIN — Medication 60 MILLIGRAM(S): at 05:16

## 2019-09-17 RX ADMIN — RIVAROXABAN 15 MILLIGRAM(S): KIT at 17:32

## 2019-09-17 RX ADMIN — CARVEDILOL PHOSPHATE 18.75 MILLIGRAM(S): 80 CAPSULE, EXTENDED RELEASE ORAL at 05:16

## 2019-09-17 RX ADMIN — Medication 40 MILLIEQUIVALENT(S): at 12:27

## 2019-09-17 RX ADMIN — Medication 10 MILLIEQUIVALENT(S): at 12:27

## 2019-09-17 RX ADMIN — Medication 1 APPLICATION(S): at 12:28

## 2019-09-17 RX ADMIN — Medication 40 MILLIEQUIVALENT(S): at 17:35

## 2019-09-17 RX ADMIN — GABAPENTIN 100 MILLIGRAM(S): 400 CAPSULE ORAL at 17:32

## 2019-09-17 RX ADMIN — Medication 40 MILLIEQUIVALENT(S): at 21:02

## 2019-09-17 RX ADMIN — Medication 3 MILLILITER(S): at 00:32

## 2019-09-17 RX ADMIN — Medication 3 MILLILITER(S): at 07:29

## 2019-09-17 RX ADMIN — Medication 250 MILLIGRAM(S): at 05:15

## 2019-09-17 RX ADMIN — Medication 3 MILLILITER(S): at 19:39

## 2019-09-17 RX ADMIN — GABAPENTIN 100 MILLIGRAM(S): 400 CAPSULE ORAL at 05:16

## 2019-09-17 NOTE — PROGRESS NOTE ADULT - PROBLEM SELECTOR PLAN 2
Acute, s/p traumatic injury requiring 10 sutures   -Left medial LE warm, tender and erythematous w/ central eschar & known vascular disease on this leg  - completed course of abx this AM; will observe off of abx at this time  -Wound care consulted

## 2019-09-17 NOTE — DIETITIAN INITIAL EVALUATION ADULT. - ADD RECOMMEND
Recommend Ensure enlive 8oz po BID, prosource 30cc po daily. Recommend MVI with minerals daily. Recommend Ensure enlive 8oz po BID, prosource 30cc po daily. Recommend MVI with minerals daily. SLP evaluation per MD discretion.

## 2019-09-17 NOTE — PROGRESS NOTE ADULT - PROBLEM SELECTOR PLAN 8
Chronic  -Pt @ baseline Cr 1.5  -Monitor BMP and GFR in setting of vanco dosing and Xarelto   -Avoid nephrotoxic drugs Chronic  -Pt @ baseline Cr 1.5  -Avoid nephrotoxic drugs

## 2019-09-17 NOTE — PROGRESS NOTE ADULT - PROBLEM SELECTOR PLAN 5
Chronic   -Pt received transfusions x3 08/2019 which resulted in worsening b/l LE edema   -Monitor H/H , type and screen  -Transfuse PRN  - B12 levels WNL

## 2019-09-17 NOTE — DIETITIAN INITIAL EVALUATION ADULT. - PHYSICAL APPEARANCE
BMI 23.1/other (specify) Per nutrition focused physical exam moderate muscle wasting observed in temples, clavicles, shoulders and thighs and moderate fat depletion in orbital area and triceps.

## 2019-09-17 NOTE — PROGRESS NOTE ADULT - ATTENDING COMMENTS
pt feels better, Cardio will switch IV lasix to po at am, f/u CXR at am, pt needs SIMBA with Bipap at night and PRN.

## 2019-09-17 NOTE — PROGRESS NOTE ADULT - SUBJECTIVE AND OBJECTIVE BOX
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 Pt also admits to wheelchair induced injury to the left LE resulting in 10 stiches on 9/4/2019 now pt stating area is cellulitic but it has not been treated outpatient. Per pt, [facility] which resulted in worsening b/l LE edema.     INTERVAL EVENTS:  Patient seen and examined at bedside.  He reports ongoing SOB/cough, however, tolerated BiPAP well overnight.  He denies fever/chills, reports no other complaints.    REVIEW OF SYSTEMS:    CONSTITUTIONAL: No weakness, fevers or chills  EYES/ENT: No visual changes, no throat pain   RESPIRATORY: + cough, wheezing,  shortness of breath; No hemoptysis  CARDIOVASCULAR: No chest pain or palpitations  GASTROINTESTINAL: No abdominal pain, nausea, vomiting, or hematemesis; No diarrhea or constipation. No melena or hematochezia.  GENITOURINARY: No dysuria, frequency or hematuria  NEUROLOGICAL: No dizziness, numbness, or weakness  SKIN: RLE lesion healing well, no pain or erythema  All other review of systems is negative unless indicated above.    VITAL SIGNS:  T(C): 36.4 (09-17-19 @ 04:08), Max: 36.4 (09-16-19 @ 20:19)  HR: 60 (09-17-19 @ 07:31) (58 - 92)  BP: 135/55 (09-17-19 @ 04:08) (117/56 - 135/55)  RR: 19 (09-17-19 @ 04:08) (18 - 19)  SpO2: 96% (09-17-19 @ 07:31) (95% - 98%)    PHYSICAL EXAM:     GENERAL: dyspneic with speech; ill appearing;   HEENT: NC/AT, EOMI, neck supple, dry mucous membranes  RESPIRATORY: diffuse end expiratory wheeze w/ bibasilar crackles  CARDIOVASCULAR: RRR, no murmurs, gallops, rubs  ABDOMINAL: soft, non-tender, non-distended, positive bowel sounds   EXTREMITIES: no clubbing, cyanosis, or edema; healing RLE eschar w/o surrounding erythema  NEUROLOGICAL: alert and oriented x 3, non-focal  SKIN: as noted above  MUSCULOSKELETAL: no gross joint deformity                          8.7    8.78  )-----------( 302      ( 17 Sep 2019 10:13 )             28.6     09-16    140  |  96  |  38<H>  ----------------------------<  100<H>  3.2<L>   |  36<H>  |  1.20    Ca    9.2      16 Sep 2019 09:13  Phos  2.7     09-16    TPro  x   /  Alb  2.8<L>  /  TBili  x   /  DBili  x   /  AST  x   /  ALT  x   /  AlkPhos  x   09-16      CAPILLARY BLOOD GLUCOSE          MEDICATIONS  (STANDING):  ALBUTerol/ipratropium for Nebulization 3 milliLiter(s) Nebulizer every 6 hours  aspirin enteric coated 81 milliGRAM(s) Oral daily  atorvastatin 40 milliGRAM(s) Oral at bedtime  carvedilol 18.75 milliGRAM(s) Oral every 12 hours  fenofibrate Tablet 145 milliGRAM(s) Oral daily  furosemide   Injectable 60 milliGRAM(s) IV Push two times a day  gabapentin 100 milliGRAM(s) Oral two times a day  influenza   Vaccine 0.5 milliLiter(s) IntraMuscular once  mineral oil/petrolatum Hydrophilic Ointment 1 Application(s) Topical daily  potassium chloride    Tablet ER 10 milliEquivalent(s) Oral daily  rivaroxaban 15 milliGRAM(s) Oral with dinner

## 2019-09-17 NOTE — DIETITIAN INITIAL EVALUATION ADULT. - PERTINENT LABORATORY DATA
(9/17) Hgb 8.7, Hct 28.6, K 3.3, BUN 37 (9/16)Alb 2.8 (9/11)HgbA1c 5.1% ([facility])

## 2019-09-17 NOTE — PROGRESS NOTE ADULT - ASSESSMENT
PATIENT EMIGDIO REEVES [facility] P [phone]   1934 DOA 9/10/2019 DR HOLLY LEE                            PULMONARY/CRITICAL CARE ASSESSMENT/RECOMMENDATIONS LIST                     RESP GAS EXCHANGE NEED FOR HOME BPAP   Patient seems to have compensated chronic resp acidosis likely from copd (was smoker retd )   May use bpap as needed if he has increased work breathing   Monitor pulse ox prn sob and adjust O2 to keep po 90-95%  Has been using noct bpap (9/15)  Will benefit from home bpap after discharge If he goes to [facility] wilfredo sparks use bpap at night and duirng day as needed   2019 I have requested sw to try arrange home bpap based on abg 2019 6a bpap /.4 740/53/126 After he finishes [facility] he should have bpap at home     PLEURAL EFFUSION   Is likely sec to CHF Doubt that it is contributing significantly to dyspnea and even if we drained it it will likely recur as it is probably from his s CHF Will observe at this point    COPD   2018 rodolfo showed mod obstructive pattern   Continue duoneb Under control   LUNG NODULE   Pt has new rul nodule He will need cts eval  He should come see me in office [phone] within 2 w after discharge  A fib   On xarelto   S CHF   9/10 cxr chf   echo ef 35% pasp 49 dilated ivc lae   Lasix 60.2 ()   Cont Rx  INFECTION   Cellulitis On ceftin    cns blod culture () likely contaminant                       TIME SPENT Over 25 minutes aggregate care time spent on encounter; activities included   direct pt care, counseling and/or coordinating care reviewing notes, lab data/ imaging , discussion with multidisciplinary team/ pt /family. Risks, benefits, alternatives  discussed in detai

## 2019-09-17 NOTE — DIETITIAN INITIAL EVALUATION ADULT. - PROBLEM SELECTOR PLAN 3
Acute on chronic systolic CHF exacerbation   -BNP 8707  -Last known Echo 2016, EF 30%  -Pt sees Dr. Moore, may have gotten more recent Echo, pt cannot recall   -Echo ordered, f/u results  -Per pt, home torsemide is adjusted routinely by Dr. Moore based on pt weight and volume status  -Start Lasix 40mg IV BID   -Cardio consulted, f/u recs   -Strict I&Os, daily weights, HOB elevated

## 2019-09-17 NOTE — PROGRESS NOTE ADULT - ASSESSMENT
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 Admit for cellulitis and decompensated heart failure.    Chronic urinary retention: Neville inserted     Acute respiratory hypercapnic failure: 2/2 acute on chronic CHF exacerbation, started on BIPAP and given IV lasix. improved.     Acute metabolic encephalopathy: 2/2 hypercapnia respiratory failure, resolved.     Code Status: family and POA decided for DNR, DNI. MOLST reviewed and spoke to family at least for 30 min regarding MOSLT review and code status, they understood and agreed with above plan..       Chest pain, atypical, check EKG, Trop times 2, Nitro S/L stat. 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 Admit for cellulitis and decompensated heart failure.    Chronic urinary retention: Neville inserted     Acute respiratory hypercapnic failure: 2/2 acute on chronic CHF exacerbation, started on BIPAP and given IV lasix. improved.     Acute metabolic encephalopathy: 2/2 hypercapnia respiratory failure, resolved.     Code Status: family and POA decided for DNR, DNI. MOLST reviewed and spoke to family at least for 30 min regarding MOSLT review and code status, they understood and agreed with above plan..       Chest pain, atypical, no ischemic changes on  EKG, Trop times 2 negative , resolved. 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 Admit for cellulitis and decompensated heart failure.    Chronic urinary retention: Neville inserted     Acute respiratory hypercapnic failure: 2/2 acute on chronic CHF exacerbation, started on BIPAP and given IV lasix. improved.     Acute metabolic encephalopathy: 2/2 hypercapnia respiratory failure, resolved.     Code Status: family and POA decided for DNR, DNI. MOLST reviewed and spoke to family at least for 30 min regarding MOSLT review and code status, they understood and agreed with above plan..       Chest pain, atypical, no ischemic changes on  EKG, Trop times 2 negative , resolved.     dysphagia: consult SE.     Moderate calory and protein malnutrition: started on supplementary diet. f/u with nutrition service.

## 2019-09-17 NOTE — DIETITIAN INITIAL EVALUATION ADULT. - PROBLEM SELECTOR PLAN 4
- LBBB noted on EKG. not on prior EKG 2018.   - no active CP, SOB   - check cardiac enzymes.  - cardio consulted.

## 2019-09-17 NOTE — DIETITIAN INITIAL EVALUATION ADULT. - OTHER INFO
Pt A+Ox4, weak at time of visit. Wife at bedside. Seen secondary to LOS > 7 days. Dx left lower extremity wound/cellulitis. CHF exacerbation. Lasix rx. Elmer feet 2+edema. UBW ~190lbs. Per wife recent weight increase to 198lbs secondary fluid retention. Now 170lbs(9/15) after 1 week aggressive diuretics. Weight loss predominantly fluid losses however may be actual loss as pt with decreased appetite/po intake.  % documented in EMR since admission w/ usual intake 30-50% per pt/wife. No report N/V. +colostomy- functioning well with loose brown stool 9/16. No Hx DM, HgbA1c 5.1%. Hx Fe deficiency anemia s/p 3 transfusions in August 2019. Pta follows JACINTO diet. Declined HF education. Pt meets criteria for moderate protein calorie malnutrition in context of chronic illness. Pt A+Ox4, weak at time of visit. Wife at bedside. Seen secondary to LOS > 7 days. Dx left lower extremity wound/cellulitis. CHF exacerbation. Lasix rx. Elmer feet 2+edema. UBW ~190lbs. Per wife recent weight increase to 198lbs secondary fluid retention. Now 170lbs(9/15) after 1 week aggressive diuretics. Weight loss predominantly fluid losses however may be actual loss as pt with decreased appetite/po intake since admission. % documented in EMR since admission w/ usual intake only 30-50% meals per pt/wife. Per RN pt observed with some difficulty swallowing liquids. Consider SLP evaluation per MD discretion. No report N/V. +colostomy- functioning well with loose brown stool 9/16. No Hx DM, HgbA1c 5.1%. Hx Fe deficiency anemia s/p 3 transfusions in August 2019. Pta follows JACINTO diet. Declined HF education. Pt meets criteria for moderate protein calorie malnutrition in context of chronic illness.

## 2019-09-17 NOTE — PROGRESS NOTE ADULT - SUBJECTIVE AND OBJECTIVE BOX
Patient was examined Chart reviewed Detailed note will be inserted below after going over latest data Meanwhile please refer to my previous notes for Pulmonary/Critical Care assessment recommendations EMIGDIO REEVES [facility] [phone]   1934 DOA 9/10/2019 DR HOLLY ZIEGLER   ALLERGY     nka    CONTACT     sp Esperanza RODRIGUEZ       Initial evaluation/Pulmonary Critical Care consultation requested on 2019   by Dr Ziegler  from Dr Singh   Patient examined chart reviewed    HOSPITAL ADMISSION   PATIENT CAME  FROM (if information available)        TYPE OF VISIT      Subsequent Pulmonary followup     REASON FOR VISIT  PLEASE SEE PROBLEM LIST/ASSESSMENT AND RECOMMENDATIONS     PATIENT EMIGDIO REEVES [facility] [phone]   1934 DOA 9/10/2019 DR HOLLY ZIEGLER     VITALS/LABS       2019 afeb 60 120/60 18 94%   2019 W 8.7 Hb 8.7 Plt 302 Na 142 K 3.3 CO2 41 Cr 1.2     REVIEW OF SYMPTOMS     NOTE Noteworthy changes  if any  in ROS and PE are also entered  in note  below      Able to give ROS  Yes     RELIABLE No   CONSTITUTIONAL Weakness Yes  Chills No Vision changes No  ENDOCRINE No unexplained hair loss No heat or cold intolerance    ALLERGY No hives  Sore throat No   RESP Coughing blood no  Shortness of breath YES   NEURO No Headache  Confusion Pain neck No   CARDIAC No Chest pain No Palpitations   GI No Pain abdomen NO   Vomiting NO     PHYSICAL EXAM    HEENT Unremarkable PERRLA atraumatic   RESP Fair air entry EXP prolonged    Harsh breath sound Resp distres mild   CARDIAC S1 S2 No S3     NO JVD    ABDOMEN SOFT BS PRESENT NOT DISTENDED No hepatosplenomegaly PEDAL EDEMA present No calf tenderness  NO rash   GENERAL Not TOXIC looking    PATIENT EMIGDIO REEVES [facility] [phone]   1934 DOA 9/10/2019 DR HOLLY ZIEGLER                             Pt description                          85 m 1 ppd smoker quit age 40 retd [facility]  Has home O2  PMH Colon CA 2004 surgery chemo l colostomy ischemic colitis  AICD   CABG 2003 [place] to LAD DVG to OM1 /PDA   c STENOSIS 50-79% lica A fib on Xarelto PVD CKD 3 Prostate ca HO urine retn Does self catheterization  sev years PVD Angioplasty R SFA sp rle trombosis  amputatn 1st and 2nd metattarsal  HO lung nodule PET [facility]anger 2018   (Remote colon ca 2004 Prostate ca )   Mild but not focal FDG uptake corresponding to a RLL nodule SUV 1.3 1 x1 cm (prev 1.4x 1 )   2018  109% FEV1 238 88% FEV1% 56% Mild obstrn was admitted 9/10/2019 with weakness productive cough and nonhealing wound and managed as cellulitis and decomepnsated heart failure  Pulmonary consulted 2019 as he has pl effs and is short of breath               PATIENT EMIGDIO REEVES [facility] [phone]   1934 DOA 9/10/2019 DR HOLLY ZIEGLER                             PATIENT DATA    ALLERGY   nka                             HEAD OF BED ELEVATION Yes   DVT PROPHYLAXIS rivaroca()   DIET     dash ()

## 2019-09-17 NOTE — PROGRESS NOTE ADULT - ATTENDING COMMENTS
seen/examined. agree with above. seen/examined. agree with above. Clinically improving, though slowly.  Continue iv lasix through today. Creatinine remains stable  bipap for hypercarbic respiratory failure.

## 2019-09-17 NOTE — PROGRESS NOTE ADULT - SUBJECTIVE AND OBJECTIVE BOX
[facility] -- Claudio Gilliland, Shavon Madden Pannella, Patel, Savella  Office # [phone]      Follow Up:  CHF    Subjective/Observations:   Seen at bedside, still with shortness of breath   denies chest pain dizziness palpitations   no fever chills n/v/d    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  Ischemic bowel disease  Colon cancer: s/p chemo  Automatic implantable cardioverter-defibrillator in situ  Acute MI: s/p CABG x3  Hypertension  Congestive heart failure  Insomnia  Anemia, vitamin B12 deficiency  HTN (hypertension)  Ischemic colitis, enteritis, or enterocolitis: s/p partial colectomy  Vitamin B 12 deficiency  Prostate cancer: s/p radiation  Afib  CKD (chronic kidney disease)  MI (myocardial infarction)  PAD (peripheral artery disease): s/p stent, fem-fem bypass  CAD (coronary artery disease)  Peripheral Neuropathy  COPD (chronic obstructive pulmonary disease)  Colostomy care  Cataract extraction status of eye, right  S/P colostomy  S/P cardiac pacemaker procedure: AICD  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  S/P small bowel resection  S/P colon resection  S/P cholecystectomy  S/P bypass graft of extremity: Left to right femoral-femoral artery bypass graft 10 years ago  S/P CABG x 3      MEDICATIONS  (STANDING):  ALBUTerol/ipratropium for Nebulization 3 milliLiter(s) Nebulizer every 6 hours  aspirin enteric coated 81 milliGRAM(s) Oral daily  atorvastatin 40 milliGRAM(s) Oral at bedtime  carvedilol 18.75 milliGRAM(s) Oral every 12 hours  fenofibrate Tablet 145 milliGRAM(s) Oral daily  furosemide   Injectable 60 milliGRAM(s) IV Push two times a day  gabapentin 100 milliGRAM(s) Oral two times a day  influenza   Vaccine 0.5 milliLiter(s) IntraMuscular once  mineral oil/petrolatum Hydrophilic Ointment 1 Application(s) Topical daily  potassium chloride    Tablet ER 10 milliEquivalent(s) Oral daily  rivaroxaban 15 milliGRAM(s) Oral with dinner    MEDICATIONS  (PRN):  petrolatum Ophthalmic Ointment 1 Application(s) Both EYES two times a day PRN eye dryness      Allergies    No Known Allergies    Intolerances        Vital Signs Last 24 Hrs  T(C): 36.4 (17 Sep 2019 04:08), Max: 36.4 (16 Sep 2019 20:19)  T(F): 97.5 (17 Sep 2019 04:08), Max: 97.5 (16 Sep 2019 20:19)  HR: 60 (17 Sep 2019 07:31) (58 - 92)  BP: 135/55 (17 Sep 2019 04:08) (117/56 - 135/55)  BP(mean): --  RR: 19 (17 Sep 2019 04:08) (18 - 19)  SpO2: 96% (17 Sep 2019 07:31) (95% - 98%)    I&O's Summary    16 Sep 2019 07:01  -  17 Sep 2019 07:00  --------------------------------------------------------  IN: 440 mL / OUT: 1800 mL / NET: -1360 mL          PHYSICAL EXAM:  TELE: Paced 60bpm  Constitutional: NAD, awake and alert, well-developed  HEENT: Moist Mucous Membranes, Anicteric  Pulmonary Diffuse expiratory wheeze, bibasilar rales   Cardiovascular: IRegular, S1 and S2 nl, No murmurs, rubs, gallops or clicks  Gastrointestinal: Bowel Sounds present, soft, nontender.   Lymph: + 1 edema bilateral LE.  Skin: No visible rashes or ulcers.  Psych:  Mood & affect appropriate    LABS: All Labs Reviewed:                        8.7    7.73  )-----------( 265      ( 15 Sep 2019 09:53 )             27.9                         8.6    7.84  )-----------( 249      ( 14 Sep 2019 10:48 )             26.9     16 Sep 2019 09:13    140    |  96     |  38     ----------------------------<  100    3.2     |  36     |  1.20   15 Sep 2019 09:53    139    |  95     |  37     ----------------------------<  98     3.4     |  37     |  1.40   14 Sep 2019 10:48    137    |  95     |  32     ----------------------------<  88     3.4     |  34     |  1.40     Ca    9.2        16 Sep 2019 09:13  Ca    9.6        15 Sep 2019 09:53  Ca    9.3        14 Sep 2019 10:48  Phos  2.7       16 Sep 2019 09:13  Phos  2.9       15 Sep 2019 09:53  Phos  2.7       14 Sep 2019 10:48  Mg     1.8       15 Sep 2019 09:53    TPro  x      /  Alb  2.8    /  TBili  x      /  DBili  x      /  AST  x      /  ALT  x      /  AlkPhos  x      16 Sep 2019 09:13  TPro  6.6    /  Alb  2.9    /  TBili  0.5    /  DBili  x      /  AST  18     /  ALT  19     /  AlkPhos  35     14 Sep 2019 10:48      CARDIAC MARKERS ( 16 Sep 2019 09:04 )  .017 ng/mL / x     / x     / x     / x      CARDIAC MARKERS ( 15 Sep 2019 19:45 )  <.015 ng/mL / x     / x     / x     / x             ECG:  < from: 12 Lead ECG (09.16.19 @ 08:29) >  Ventricular Rate 65 BPM    Atrial Rate 59 BPM    QRS Duration 184 ms    Q-T Interval 526 ms    QTC Calculation(Bezet) 547 ms    R Axis -51 degrees    T Axis 105 degrees    Diagnosis Line  pvcs    Abnormal ECG  When compared with ECG of 10-SEP-2019 18:07, No significant change was found    < end of copied text >      Echo:  < from: TTE Echo Doppler w/o Cont (09.11.19 @ 17:32) >    Conclusion:   Technically difficult study  Moderate left ventricular systolic dysfunction, estimated LVEF of 35-40%.   Left ventricular hypertrophy and enlargement is noted. Septal motion   consistent with conduction delay  Right ventricle is not well visualized. Devicewire is noted within the   right heart  Left atrial enlargement  Sclerotic trileaflet aortic valve with normal opening. Trace AI.   Moderate to severe MR   Trace TR.    Estimated PA systolic pressure of 49 mmHg. The IVC is dilated  No significant pericardial effusion.        < end of copied text >      Radiology:  < from: Xray Chest 1 View- PORTABLE-Routine (09.14.19 @ 07:24) >    Left multi lead cardiac device.    The cardiac silhouette is enlarged. Pulmonary edema. Small bilateral   pleural effusions.    IMPRESSION: Pulmonary edema and small bilateral pleural effusions with   slight interval improvement    < end of copied text >

## 2019-09-17 NOTE — CHART NOTE - NSCHARTNOTEFT_GEN_A_CORE
Upon Nutritional Assessment by the Registered Dietitian your patient was determined to meet criteria / has evidence of the following diagnosis/diagnoses:          [ ]  Mild Protein Calorie Malnutrition        [ x]  Moderate Protein Calorie Malnutrition        [ ] Severe Protein Calorie Malnutrition        [ ] Unspecified Protein Calorie Malnutrition        [ ] Underweight / BMI <19        [ ] Morbid Obesity / BMI > 40      Findings as based on:  •  Comprehensive nutrition assessment and consultation  •  Calorie counts (nutrient intake analysis)  •  Food acceptance and intake status from observations by staff  •  Follow up  •  Patient education  •  Intervention secondary to interdisciplinary rounds  •   concerns    Pt meets criteria for moderate protein calorie malnutrition in context of chronic disease as evidenced by  *Moderate muscle wasting observed in temples, clavicles, shoulders, thighs  *Moderate fat depletion in orbital region and triceps    Treatment:    The following diet has been recommended:  *Recommend liberalizing diet to Dash/TLC with ensure enlive 8oz po BID, prosource 30cc daily.   *Recommend MVI with minerals daily  *Recommend SLP evaluation per MD discretion.     PROVIDER Section:     By signing this assessment you are acknowledging and agree with the diagnosis/diagnoses assigned by the Registered Dietitian    Comments:

## 2019-09-17 NOTE — PROGRESS NOTE ADULT - ASSESSMENT
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, osteoarthritis, peripheral neuropathy presents admitted for LLE cellulitis, acute on chronic systolic CHF    CV : ACute on chronic systolic CHF, CAD MI, AICD   Remains hypervolemic on exam despite negative 1360 cc 24 hours on lasix 60mg IV BID  keep strict intake and output, 1.2 liter fluid restriction   Hypokalemia noted- supplement to keep K > 4.0 and Mg >2.0 AM labs pending   renal profile pending - If Co2 continues to increase check ABG if ph > 7.45 dose Diamox   Continue with ASA Coreg Lipitor Fenofibrate, no ace arb given CKD    ID LE Cellulitis  Follow up Blood cultures  Antibiotics as per primary team     COPD  Supplemental O2  nebulizers PRn  Pulmonary following     Anemia  Anemia work up as per primary team  Keep hgb > 8 given CAD history   IF transfusing given split units, lasix in between and frequent lung checks     Afib  Rate controlled on Coreg  Thromboembolism ppx on Xarelto PO    HLD  Continue with Lipitor    CKD  Baseline cr 1.5  Daily renal profile     Family requesting palliative consultation for advanced CHF  PT   Fall precautions  Consider Speech and Swallow evaluation   AM labs pending - further recommendations pending review of am labs     Jacqueline Dennis FNP-C  Cardiology NP  [facility] [phone] 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  Continue with ASA Coreg Lipitor Fenofibrate, no ace arb given CKD  consider ct chest to evaluate effusions    ID LE Cellulitis  Follow up Blood cultures  Antibiotics as per primary team     COPD  Supplemental O2  nebulizers PRn  Pulmonary following     Anemia  Anemia work up as per primary team  Keep hgb > 8 given CAD history   IF transfusing given split units, lasix in between and frequent lung checks     Afib  Rate controlled on Coreg  Thromboembolism ppx on Xarelto PO    HLD  Continue with Lipitor    CKD  Baseline cr 1.5  Daily renal profile     Family requesting palliative consultation for CHF  PT   Fall precautions  Consider Speech and Swallow evaluation   AM labs pending - further recommendations pending review of am labs     Jacqueline Dennis FNP-C  Cardiology NP  [facility] [phone] 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 Overall, he seems to be improving.  keep strict intake and output, 1.2 liter fluid restriction   Hypokalemia noted- supplement to keep K > 4.0 and Mg >2.0 AM labs pending   Continue with ASA Coreg Lipitor Fenofibrate, no ace arb given CKD    ID LE Cellulitis  Follow up Blood cultures  Antibiotics as per primary team     COPD  Supplemental O2  nebulizers PRn  Pulmonary following     Anemia  Anemia work up as per primary team  Keep hgb > 8 given CAD history   IF transfusing given split units, lasix in between and frequent lung checks     Afib  Rate controlled on Coreg  Thromboembolism ppx on Xarelto PO    HLD  Continue with Lipitor    CKD  Baseline cr 1.5  Daily renal profile     Family requesting palliative consultation for CHF  PT   Fall precautions  Consider Speech and Swallow evaluation   AM labs pending - further recommendations pending review of am labs     Jacqueline Dennis FNP-C  Cardiology NP  [facility] [phone]

## 2019-09-17 NOTE — DIETITIAN INITIAL EVALUATION ADULT. - PROBLEM SELECTOR PLAN 1
- p/w generalized weakness. likely multifactorial. acute infection-cellulitis- decompensated heart failure and diarrhea   - c/o cough with sputum production- afebrile, no leukocytosis, less likely infectious etiology however will check legionella and s. pneumo antigen, f./u results of cxr- prelim read appears relatively unchanged from prior- possible increased pulmonary congestion. cough more likely 2/2 volume overload . diurese. monitor strict I&Os.   - weakness also likely 2/2 acute infection - 2/2 acute cellulitis of lower extremity, will start IV abx. f/u blood cultures, trend temp/leukocytosis, am labs.  - diarrhea - pt with recent abx use. will check stool for c diff. stool pcr. hold off on ivf for now given pt appears hypervolemic. will monitor lytes/renal function and volume status closely.   - PT eval

## 2019-09-17 NOTE — PROGRESS NOTE ADULT - PROBLEM SELECTOR PLAN 1
- p/w generalized weakness. likely multifactorial. acute infection-cellulitis- decompensated heart failure  - cellulitis of lower extremity,completed course of oral abx; trend temp/leukocytosis, am labs.  - will monitor lytes/renal function and volume status closely.   - PT eval - p/w generalized weakness. likely multifactorial. acute infection-cellulitis- decompensated heart failure  - cellulitis of lower extremity, completed course of oral abx; trend temp/leukocytosis, am labs.  - will monitor lytes/renal function and volume status closely.   - PT eval

## 2019-09-17 NOTE — PROGRESS NOTE ADULT - PROBLEM SELECTOR PLAN 3
Acute on chronic systolic CHF exacerbation; respiratory distress: continues to have good output  -BNP 8707  -echo shows stable EF at 30-35%  -Continue Lasix 60mg IV BID: will add standing Potassium 10 meq daily  - Cardio, consulted recs appreciated  -Strict I&Os, daily weights, HOB elevated, negative balance ~ 7 lit. Acute on chronic systolic CHF exacerbation; respiratory distress: continues to have good output  -BNP 8707  -echo shows stable EF at 30-35%  -Continue Lasix 60mg IV BID: will add standing Potassium 10 meq daily  - Cardio, consulted recs appreciated  -Strict I&Os, daily weights, HOB elevated, negative balance ~ 8 lit.  Cardio consider to switch IV lasix to po 60 mg BID at am. f/u with cardio team.  repeat CXR at am.

## 2019-09-18 LAB
ANION GAP SERPL CALC-SCNC: 7 MMOL/L — SIGNIFICANT CHANGE UP (ref 5–17)
BUN SERPL-MCNC: 43 MG/DL — HIGH (ref 7–23)
CALCIUM SERPL-MCNC: 10.2 MG/DL — HIGH (ref 8.5–10.1)
CHLORIDE SERPL-SCNC: 99 MMOL/L — SIGNIFICANT CHANGE UP (ref 96–108)
CO2 SERPL-SCNC: 39 MMOL/L — HIGH (ref 22–31)
CREAT SERPL-MCNC: 1.5 MG/DL — HIGH (ref 0.5–1.3)
GLUCOSE SERPL-MCNC: 104 MG/DL — HIGH (ref 70–99)
HCT VFR BLD CALC: 30 % — LOW (ref 39–50)
HGB BLD-MCNC: 9.1 G/DL — LOW (ref 13–17)
MCHC RBC-ENTMCNC: 30 PG — SIGNIFICANT CHANGE UP (ref 27–34)
MCHC RBC-ENTMCNC: 30.3 GM/DL — LOW (ref 32–36)
MCV RBC AUTO: 99 FL — SIGNIFICANT CHANGE UP (ref 80–100)
NRBC # BLD: 0 /100 WBCS — SIGNIFICANT CHANGE UP (ref 0–0)
NT-PROBNP SERPL-SCNC: HIGH PG/ML (ref 0–450)
PLATELET # BLD AUTO: 293 K/UL — SIGNIFICANT CHANGE UP (ref 150–400)
POTASSIUM SERPL-MCNC: 4.2 MMOL/L — SIGNIFICANT CHANGE UP (ref 3.5–5.3)
POTASSIUM SERPL-SCNC: 4.2 MMOL/L — SIGNIFICANT CHANGE UP (ref 3.5–5.3)
RBC # BLD: 3.03 M/UL — LOW (ref 4.2–5.8)
RBC # FLD: 15.6 % — HIGH (ref 10.3–14.5)
SODIUM SERPL-SCNC: 145 MMOL/L — SIGNIFICANT CHANGE UP (ref 135–145)
WBC # BLD: 9.1 K/UL — SIGNIFICANT CHANGE UP (ref 3.8–10.5)
WBC # FLD AUTO: 9.1 K/UL — SIGNIFICANT CHANGE UP (ref 3.8–10.5)

## 2019-09-18 PROCEDURE — 99232 SBSQ HOSP IP/OBS MODERATE 35: CPT

## 2019-09-18 PROCEDURE — 99233 SBSQ HOSP IP/OBS HIGH 50: CPT | Mod: GC

## 2019-09-18 PROCEDURE — 71045 X-RAY EXAM CHEST 1 VIEW: CPT | Mod: 26

## 2019-09-18 RX ORDER — FUROSEMIDE 40 MG
60 TABLET ORAL
Refills: 0 | Status: DISCONTINUED | OUTPATIENT
Start: 2019-09-18 | End: 2019-09-19

## 2019-09-18 RX ORDER — SODIUM,POTASSIUM PHOSPHATES 278-250MG
1 POWDER IN PACKET (EA) ORAL
Refills: 0 | Status: COMPLETED | OUTPATIENT
Start: 2019-09-18 | End: 2019-09-18

## 2019-09-18 RX ADMIN — Medication 60 MILLIGRAM(S): at 06:49

## 2019-09-18 RX ADMIN — Medication 3 MILLILITER(S): at 19:48

## 2019-09-18 RX ADMIN — Medication 600 MILLIGRAM(S): at 17:23

## 2019-09-18 RX ADMIN — Medication 3 MILLILITER(S): at 07:10

## 2019-09-18 RX ADMIN — Medication 3 MILLILITER(S): at 14:00

## 2019-09-18 RX ADMIN — ATORVASTATIN CALCIUM 40 MILLIGRAM(S): 80 TABLET, FILM COATED ORAL at 21:48

## 2019-09-18 RX ADMIN — Medication 1 TABLET(S): at 17:23

## 2019-09-18 RX ADMIN — CARVEDILOL PHOSPHATE 18.75 MILLIGRAM(S): 80 CAPSULE, EXTENDED RELEASE ORAL at 17:27

## 2019-09-18 RX ADMIN — RIVAROXABAN 15 MILLIGRAM(S): KIT at 17:20

## 2019-09-18 RX ADMIN — GABAPENTIN 100 MILLIGRAM(S): 400 CAPSULE ORAL at 17:20

## 2019-09-18 RX ADMIN — Medication 1 APPLICATION(S): at 12:21

## 2019-09-18 RX ADMIN — Medication 60 MILLIGRAM(S): at 17:25

## 2019-09-18 RX ADMIN — Medication 3 MILLILITER(S): at 01:54

## 2019-09-18 RX ADMIN — GABAPENTIN 100 MILLIGRAM(S): 400 CAPSULE ORAL at 06:49

## 2019-09-18 RX ADMIN — Medication 145 MILLIGRAM(S): at 12:21

## 2019-09-18 RX ADMIN — Medication 10 MILLIEQUIVALENT(S): at 12:21

## 2019-09-18 RX ADMIN — Medication 1 TABLET(S): at 13:51

## 2019-09-18 RX ADMIN — CARVEDILOL PHOSPHATE 18.75 MILLIGRAM(S): 80 CAPSULE, EXTENDED RELEASE ORAL at 06:49

## 2019-09-18 RX ADMIN — Medication 81 MILLIGRAM(S): at 12:21

## 2019-09-18 RX ADMIN — Medication 1 APPLICATION(S): at 12:34

## 2019-09-18 NOTE — CHART NOTE - NSCHARTNOTEFT_GEN_A_CORE
Called by RN for patient with 3 beats of v tach on tele monitor. As per RN, patient laying in bed comfortably, denies chest pain or discomfort. Mag and Phos levels ordered for the morning. Will continue to monitor, RN to call with any changes    Vital Signs Last 24 Hrs  T(C): 36.4 (18 Sep 2019 14:29), Max: 36.7 (18 Sep 2019 04:59)  T(F): 97.5 (18 Sep 2019 14:29), Max: 98 (18 Sep 2019 04:59)  HR: 60 (18 Sep 2019 19:49) (60 - 64)  BP: 150/69 (18 Sep 2019 14:29) (123/71 - 150/69)  BP(mean): --  RR: 17 (18 Sep 2019 14:29) (17 - 18)  SpO2: 96% (18 Sep 2019 19:49) (96% - 99%)

## 2019-09-18 NOTE — PROGRESS NOTE ADULT - SUBJECTIVE AND OBJECTIVE BOX
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 Pt also admits to wheelchair induced injury to the left LE resulting in 10 stiches on 9/4/2019 now pt stating area is cellulitic but it has not been treated outpatient. Per pt, [facility] which resulted in worsening b/l LE edema. Pt also admits to diarrhea x5 days- non bloody, no abdominal pain, nausea or vomiting. Pt denies recent travel or sick contacts. Pt denies dizziness, headache, TRUJILLO, chest pain, palpitations, worsening peripheral neuropathy.     INTERVAL EVENTS:  Patient seen and examined at bedside.  RN called for blood in alvarado.  Patient denies urinary complaints.  He reports ongoing SOB w/ cough, and that he is unable to clear congestion.  He denies fever/chills, chest pain, heart palpitations.    REVIEW OF SYSTEMS:    CONSTITUTIONAL: No weakness, fevers or chills  EYES/ENT: No visual changes, no throat pain   RESPIRATORY: + cough, wheezing, shortness of breath; no hemoptysis;  CARDIOVASCULAR: No chest pain or palpitations  GASTROINTESTINAL: No abdominal pain, nausea, vomiting, or hematemesis; No diarrhea or constipation. No melena or hematochezia.  GENITOURINARY: No dysuria, frequency or hematuria  NEUROLOGICAL: No dizziness, numbness, or weakness  SKIN: + LLE wound healing; No itching, burning, rashes  All other review of systems is negative unless indicated above.    VITAL SIGNS:  T(C): 36.4 (09-18-19 @ 14:29), Max: 36.7 (09-18-19 @ 04:59)  HR: 60 (09-18-19 @ 14:29) (60 - 78)  BP: 150/69 (09-18-19 @ 14:29) (119/66 - 150/69)  RR: 17 (09-18-19 @ 14:29) (17 - 18)  SpO2: 99% (09-18-19 @ 14:29) (93% - 99%)    PHYSICAL EXAM:     GENERAL: no acute distress; ill appearing; frail elderly  HEENT: NC/AT, EOMI, neck supple, dry mucous membranes  RESPIRATORY: diffuse wheezing w/ coarse breath sounds throughout  CARDIOVASCULAR: RRR, no murmurs, gallops, rubs  ABDOMINAL: soft, non-tender, non-distended, positive bowel sounds; colostomy  EXTREMITIES: no clubbing, cyanosis, or edema  NEUROLOGICAL: alert and oriented x 3, non-focal  SKIN: LLE eschar healing: no surrounding erythema or exudate  MUSCULOSKELETAL: no gross joint deformity                          9.1    9.10  )-----------( 293      ( 18 Sep 2019 09:26 )             30.0     09-18    145  |  99  |  43<H>  ----------------------------<  104<H>  4.2   |  39<H>  |  1.50<H>    Ca    10.2<H>      18 Sep 2019 09:26  Phos  2.3     09-18  Mg     1.9     09-18        CAPILLARY BLOOD GLUCOSE          MEDICATIONS  (STANDING):  ALBUTerol/ipratropium for Nebulization 3 milliLiter(s) Nebulizer every 6 hours  aspirin enteric coated 81 milliGRAM(s) Oral daily  atorvastatin 40 milliGRAM(s) Oral at bedtime  carvedilol 18.75 milliGRAM(s) Oral every 12 hours  fenofibrate Tablet 145 milliGRAM(s) Oral daily  furosemide    Tablet 60 milliGRAM(s) Oral two times a day  gabapentin 100 milliGRAM(s) Oral two times a day  influenza   Vaccine 0.5 milliLiter(s) IntraMuscular once  mineral oil/petrolatum Hydrophilic Ointment 1 Application(s) Topical daily  potassium acid phosphate/sodium acid phosphate tablet (K-PHOS No. 2) 1 Tablet(s) Oral four times a day with meals  potassium chloride    Tablet ER 10 milliEquivalent(s) Oral daily  rivaroxaban 15 milliGRAM(s) Oral with dinner Patient is a 85y old  Male who presents with a chief complaint of cough, leg swelling, and cellulitis.    INTERVAL EVENTS:  Patient seen and examined at bedside.  Pt noted to have some blood in alvarado.  Patient denies urinary complaints.  He reports gradual improvement in SOB and cough, but that he still has some congestion.  He denies fever/chills, chest pain, heart palpitations.     REVIEW OF SYSTEMS:    CONSTITUTIONAL: No weakness, fevers or chills  EYES/ENT: No visual changes, no throat pain   RESPIRATORY: + cough, wheezing, shortness of breath; no hemoptysis;  CARDIOVASCULAR: No chest pain or palpitations  GASTROINTESTINAL: No abdominal pain, nausea, vomiting, or hematemesis; No diarrhea or constipation. No melena or hematochezia.  GENITOURINARY: No dysuria, frequency or hematuria  NEUROLOGICAL: No dizziness, numbness, or weakness  SKIN: + LLE wound healing; No itching, burning, rashes  All other review of systems is negative unless indicated above.    VITAL SIGNS:  T(C): 36.4 (09-18-19 @ 14:29), Max: 36.7 (09-18-19 @ 04:59)  HR: 60 (09-18-19 @ 14:29) (60 - 78)  BP: 150/69 (09-18-19 @ 14:29) (119/66 - 150/69)  RR: 17 (09-18-19 @ 14:29) (17 - 18)  SpO2: 99% (09-18-19 @ 14:29) (93% - 99%)    PHYSICAL EXAM:     GENERAL: no acute distress; ill appearing; frail elderly  HEENT: NC/AT, EOMI, neck supple, dry mucous membranes  RESPIRATORY: diffuse wheezing w/ coarse breath sounds throughout  CARDIOVASCULAR: RRR, no murmurs, gallops, rubs  ABDOMINAL: soft, non-tender, non-distended, positive bowel sounds; colostomy  EXTREMITIES: no clubbing, cyanosis, or edema  NEUROLOGICAL: alert and oriented x 3, non-focal  SKIN: LLE eschar healing: no surrounding erythema or exudate  MUSCULOSKELETAL: no gross joint deformity                          9.1    9.10  )-----------( 293      ( 18 Sep 2019 09:26 )             30.0     09-18    145  |  99  |  43<H>  ----------------------------<  104<H>  4.2   |  39<H>  |  1.50<H>    Ca    10.2<H>      18 Sep 2019 09:26  Phos  2.3     09-18  Mg     1.9     09-18        CAPILLARY BLOOD GLUCOSE        MEDICATIONS  (STANDING):  ALBUTerol/ipratropium for Nebulization 3 milliLiter(s) Nebulizer every 6 hours  aspirin enteric coated 81 milliGRAM(s) Oral daily  atorvastatin 40 milliGRAM(s) Oral at bedtime  carvedilol 18.75 milliGRAM(s) Oral every 12 hours  fenofibrate Tablet 145 milliGRAM(s) Oral daily  furosemide    Tablet 60 milliGRAM(s) Oral two times a day  gabapentin 100 milliGRAM(s) Oral two times a day  guaiFENesin  milliGRAM(s) Oral every 12 hours  influenza   Vaccine 0.5 milliLiter(s) IntraMuscular once  mineral oil/petrolatum Hydrophilic Ointment 1 Application(s) Topical daily  potassium chloride    Tablet ER 10 milliEquivalent(s) Oral daily  rivaroxaban 15 milliGRAM(s) Oral with dinner    MEDICATIONS  (PRN):  petrolatum Ophthalmic Ointment 1 Application(s) Both EYES two times a day PRN eye dryness

## 2019-09-18 NOTE — PROGRESS NOTE ADULT - SUBJECTIVE AND OBJECTIVE BOX
Patient was examined Chart reviewed Detailed note will be inserted below after going over latest data Meanwhile please refer to my previous notes for Pulmonary/Critical Care assessment recommendations EMIGDIO REEVES [facility] [phone]   1934 DOA 9/10/2019 DR HOLLY ZIEGLER   ALLERGY     nka    CONTACT     sp Esperanza RODRIGUEZ       Initial evaluation/Pulmonary Critical Care consultation requested on 2019   by Dr Ziegler  from Dr Singh   Patient examined chart reviewed    HOSPITAL ADMISSION   PATIENT CAME  FROM (if information available)        TYPE OF VISIT      Subsequent Pulmonary followup     REASON FOR VISIT  PLEASE SEE PROBLEM LIST/ASSESSMENT AND RECOMMENDATIONS     PATIENT EMIGDIO REEVES [facility] [phone]   1934 DOA 9/10/2019 DR HOLLY ZIEGLER     VITALS/LABS       2019 afeb 60 120/70 18 98%   2019 W 9.1 Hb 9.1 Plt 293 Na 145 K 3.2 CO2 39 Cr 1.5     REVIEW OF SYMPTOMS     NOTE Noteworthy changes  if any  in ROS and PE are also entered  in note  below      Able to give ROS  Yes     RELIABLE No   CONSTITUTIONAL Weakness Yes  Chills No Vision changes No  ENDOCRINE No unexplained hair loss No heat or cold intolerance    ALLERGY No hives  Sore throat No   RESP Coughing blood no  Shortness of breath YES   NEURO No Headache  Confusion Pain neck No   CARDIAC No Chest pain No Palpitations   GI No Pain abdomen NO   Vomiting NO     PHYSICAL EXAM    HEENT Unremarkable PERRLA atraumatic   RESP Fair air entry EXP prolonged    Harsh breath sound Resp distres mild   CARDIAC S1 S2 No S3     NO JVD    ABDOMEN SOFT BS PRESENT NOT DISTENDED No hepatosplenomegaly PEDAL EDEMA present No calf tenderness  NO rash   GENERAL Not TOXIC looking

## 2019-09-18 NOTE — PROGRESS NOTE ADULT - PROBLEM SELECTOR PLAN 5
Chronic   -Pt received transfusions x3 08/2019 which resulted in worsening b/l LE edema   -Monitor H/H , type and screen  -Transfuse PRN  - B12 levels WNL -Pt received transfusions x3 08/2019 which resulted in worsening b/l LE edema   -Monitor H/H , type and screen  -Transfuse PRN  - B12 levels WNL

## 2019-09-18 NOTE — PROVIDER CONTACT NOTE (OTHER) - ACTION/TREATMENT ORDERED:
MD notified, no new orders at this time.
MD will assess pt and enter appropriate orders
MD aware. no new orders at this time.
MD aware. no new orders at this time.
No orders at this time

## 2019-09-18 NOTE — PROGRESS NOTE ADULT - ASSESSMENT
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 Overall, he seems to be improving.  keep strict intake and output, 1.2 liter fluid restriction   Hypokalemia noted- supplement to keep K > 4.0 and Mg >2.0 AM labs pending   Continue with ASA Coreg Lipitor Fenofibrate, no ace arb given CKD    ID LE Cellulitis  Follow up Blood cultures  Antibiotics as per primary team     COPD  Supplemental O2  nebulizers PRn  Pulmonary following     Anemia  Anemia work up as per primary team  Keep hgb > 8 given CAD history   IF transfusing given split units, lasix in between and frequent lung checks     Afib  Rate controlled on Coreg  Thromboembolism ppx on Xarelto PO    HLD  Continue with Lipitor    CKD  Baseline cr 1.5  Daily renal profile     Family requesting palliative consultation for CHF  PT   Fall precautions  Consider Speech and Swallow evaluation   AM labs pending - further recommendations pending review of am labs     Mayuri Ulloa DNP, ANP-c  Cardiology NP  [facility] [phone] 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 Overall, he seems to be improving.  Strict intake and output, 1.2 liter fluid restriction   Hypokalemia noted- supplement to keep K > 4.0 and Mg >2.0 AM labs pending   Continue with ASA Coreg Lipitor Fenofibrate, no ace arb given CKD    ID LE Cellulitis  Follow up Blood cultures  Antibiotics as per primary team     COPD  Supplemental O2  nebulizers PRn  Pulmonary following     Anemia  Anemia work up as per primary team  Keep hgb > 8 given CAD history   IF transfusing given split units, lasix in between and frequent lung checks     Afib  Rate controlled on Coreg  Thromboembolism ppx on Xarelto PO    HLD  Continue with Lipitor    CKD  Baseline cr 1.5  Daily renal profile     Family requesting palliative consultation for CHF  PT   Fall precautions  Consider Speech and Swallow evaluation   AM labs pending - further recommendations pending review of am labs     Mayuri Ulloa DNP, ANP-c  Cardiology NP  [facility] [phone]

## 2019-09-18 NOTE — SWALLOW BEDSIDE ASSESSMENT ADULT - SWALLOW EVAL: DIAGNOSIS
1. The patient demonstrated a mild oral dysphagia for puree, nectar thick, and thin liquid textures marked by delayed bolus collection, transfer, and posterior transport. 2. The patient demonstrated a mild-moderate oral dysphagia for solids marked by prolonged oral manipulation resulting in delayed bolus collection, transfer, and posterior transport. 3. The patient demonstrated a mild pharyngeal dysphagia for puree, solid, and nectar thick liquids marked by a delayed pharyngeal swallow trigger with reduced hyolaryngeal elevation upon digital palpation w/o evidence of airway penetration. 4. The patient demonstrated a severe pharyngeal dysphagia for thin liquids marked by a delayed pharyngeal swallow trigger with reduced hyolaryngeal elevation upon digital palpation resulting in reflexive coughing suggestive of airway penetration.

## 2019-09-18 NOTE — PROGRESS NOTE ADULT - PROBLEM SELECTOR PLAN 4
- LBBB noted on EKG. not on prior EKG 2018; no other signs of acute ischemia  - no active CP, SOB   - CE negative x 3  - cardio recs appreciated -6/13/2018 rodolfo showed mod obstructive pattern   -Continue duoneb under control  -O2 support  -BiPAP QHS as per pulm (recs appreciated)

## 2019-09-18 NOTE — PROGRESS NOTE ADULT - SUBJECTIVE AND OBJECTIVE BOX
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 Pt also admits to wheelchair induced injury to the left LE resulting in 10 stiches on 9/4/2019 now pt stating area is cellulitic but it has not been treated outpatient. Per pt, [facility] which resulted in worsening b/l LE edema. Pt also admits to diarrhea x5 days- non bloody, no abdominal pain, nausea or vomiting. Pt denies recent travel or sick contacts. Pt denies dizziness, headache, TRUJILLO, chest pain, palpitations, worsening peripheral neuropathy.     In the ED, VS T=98.2F, HR 61, /73, RR 16, SpO2 96% 2L NC.   Labs significant for H/H 8/25.4, Na+ 132, proBNP 8707  UA = lg amount of blood, mod leuk esterase   EKG: Sinus rhythm w/ 1st degree AV block, LAD, LBBB - changed from last available Aug 2018  CXR: appears mostly unchanged from prior with chronic changes- will f/u formal read. (10 Sep 2019 22:30)      Subjective/Observations:     Pt. seen examined and evaluated. Pt. resting comfortably in bed in NAD, with no respiratory distress, no chest pain, dyspnea, palpitations, PND or orthopnea     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  Ischemic bowel disease  Colon cancer: s/p chemo  Automatic implantable cardioverter-defibrillator in situ  Acute MI: s/p CABG x3  Hypertension  Congestive heart failure  Insomnia  Anemia, vitamin B12 deficiency  HTN (hypertension)  Ischemic colitis, enteritis, or enterocolitis: s/p partial colectomy  Vitamin B 12 deficiency  Prostate cancer: s/p radiation  Afib  CKD (chronic kidney disease)  MI (myocardial infarction)  PAD (peripheral artery disease): s/p stent, fem-fem bypass  CAD (coronary artery disease)  Peripheral Neuropathy  COPD (chronic obstructive pulmonary disease)  Colostomy care  Cataract extraction status of eye, right  S/P colostomy  S/P cardiac pacemaker procedure: AICD  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  S/P small bowel resection  S/P colon resection  S/P cholecystectomy  S/P bypass graft of extremity: Left to right femoral-femoral artery bypass graft 10 years ago  S/P CABG x 3      MEDICATIONS  (STANDING):  ALBUTerol/ipratropium for Nebulization 3 milliLiter(s) Nebulizer every 6 hours  aspirin enteric coated 81 milliGRAM(s) Oral daily  atorvastatin 40 milliGRAM(s) Oral at bedtime  carvedilol 18.75 milliGRAM(s) Oral every 12 hours  fenofibrate Tablet 145 milliGRAM(s) Oral daily  furosemide   Injectable 60 milliGRAM(s) IV Push two times a day  gabapentin 100 milliGRAM(s) Oral two times a day  influenza   Vaccine 0.5 milliLiter(s) IntraMuscular once  mineral oil/petrolatum Hydrophilic Ointment 1 Application(s) Topical daily  potassium acid phosphate/sodium acid phosphate tablet (K-PHOS No. 2) 1 Tablet(s) Oral four times a day with meals  potassium chloride    Tablet ER 10 milliEquivalent(s) Oral daily  rivaroxaban 15 milliGRAM(s) Oral with dinner    MEDICATIONS  (PRN):  petrolatum Ophthalmic Ointment 1 Application(s) Both EYES two times a day PRN eye dryness      Allergies: No Known Allergies    Intolerances        Vital Signs Last 24 Hrs  T(C): 36.7 (18 Sep 2019 04:59), Max: 36.7 (17 Sep 2019 14:30)  T(F): 98 (18 Sep 2019 04:59), Max: 98.1 (17 Sep 2019 14:30)  HR: 60 (18 Sep 2019 04:59) (59 - 78)  BP: 123/71 (18 Sep 2019 04:59) (119/66 - 129/62)  BP(mean): --  RR: 18 (18 Sep 2019 04:59) (18 - 18)  SpO2: 98% (18 Sep 2019 04:59) (93% - 99%)    I&O's Summary    17 Sep 2019 07:01  -  18 Sep 2019 07:00  --------------------------------------------------------  IN: 0 mL / OUT: 3300 mL / NET: -3300 mL          PHYSICAL EXAM:    Constitutional: NAD, awake and alert, well-developed  HEENT: Moist Mucous Membranes, Anicteric  Pulmonary: Non-labored, breath sounds are clear bilaterally, No wheezing, rales or rhonchi  Cardiovascular: Regular, S1 and S2, No murmurs, rubs, gallops or clicks  Gastrointestinal: Bowel Sounds present, soft, nontender.   Lymph: No peripheral edema. No lymphadenopathy.  Skin: No visible rashes or ulcers.  Psych:  Mood & affect appropriate    LABS: All Labs Reviewed:                        9.1    9.10  )-----------( 293      ( 18 Sep 2019 09:26 )             30.0                   18 Sep 2019 09:26    145    |  99     |  43     ----------------------------<  104    4.2     |  39     |  1.50     Ca    10.2       18 Sep 2019 09:26  Phos  2.3       18 Sep 2019 09:26  Mg     1.9       18 Sep 2019 09:26      Interpretation of Telemetry: Overnight on telemetry v-paced 50-60       ECHO:   < from: TTE Echo Doppler w/o Cont (09.11.19 @ 17:32) >  OBSERVATIONS:  Technically difficult study  Mitral Valve: Thickened leaflets, moderate to severe MR.  Aortic Valve/Aorta: Sclerotic trileaflet aortic valve with normal   opening. Trace AI  Tricuspid Valve: normal with trace TR.  Pulmonic Valve: Mild PI  Left Atrium: Enlarged  Right Atrium: normal  Left Ventricle: Moderate left ventricular systolic dysfunction, estimated   LVEF of 35-40%. Left ventricular hypertrophy and enlargement is noted.   Septal motion consistent with conduction delay  Right Ventricle: The right ventricle is not well visualized. Device wire   seen within the right heart  Pericardium/Pleura: normal, no significant pericardial effusion.  Pulmonary/RV Pressure: estimated PA systolic pressure of 49 mmHg     Conclusion:   Technically difficult study  Moderate left ventricular systolic dysfunction, estimated LVEF of 35-40%.   Left ventricular hypertrophy and enlargement is noted. Septal motion   consistent with conduction delay  Right ventricle is not well visualized. Devicewire is noted within the   right heart  Left atrial enlargement  Sclerotic trileaflet aortic valve with normal opening. Trace AI.   Moderate to severe MR   Trace TR.    Estimated PA systolic pressure of 49 mmHg. The IVC is dilated  No significant pericardial effusion.        Imaging: 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 Pt also admits to wheelchair induced injury to the left LE resulting in 10 stiches on 9/4/2019 now pt stating area is cellulitic but it has not been treated outpatient. Per pt, [facility] which resulted in worsening b/l LE edema. Pt also admits to diarrhea x5 days- non bloody, no abdominal pain, nausea or vomiting. Pt denies recent travel or sick contacts. Pt denies dizziness, headache, TRUJILLO, chest pain, palpitations, worsening peripheral neuropathy.     In the ED, VS T=98.2F, HR 61, /73, RR 16, SpO2 96% 2L NC.   Labs significant for H/H 8/25.4, Na+ 132, proBNP 8707  UA = lg amount of blood, mod leuk esterase   EKG: Sinus rhythm w/ 1st degree AV block, LAD, LBBB - changed from last available Aug 2018  CXR: appears mostly unchanged from prior with chronic changes- will f/u formal read. (10 Sep 2019 22:30)      Subjective/Observations:     Pt. seen examined and evaluated. Pt. resting comfortably in bed in NAD, with no respiratory distress, no chest pain, dyspnea, palpitations, PND or orthopnea     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  Ischemic bowel disease  Colon cancer: s/p chemo  Automatic implantable cardioverter-defibrillator in situ  Acute MI: s/p CABG x3  Hypertension  Congestive heart failure  Insomnia  Anemia, vitamin B12 deficiency  HTN (hypertension)  Ischemic colitis, enteritis, or enterocolitis: s/p partial colectomy  Vitamin B 12 deficiency  Prostate cancer: s/p radiation  Afib  CKD (chronic kidney disease)  MI (myocardial infarction)  PAD (peripheral artery disease): s/p stent, fem-fem bypass  CAD (coronary artery disease)  Peripheral Neuropathy  COPD (chronic obstructive pulmonary disease)  Colostomy care  Cataract extraction status of eye, right  S/P colostomy  S/P cardiac pacemaker procedure: AICD  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  S/P small bowel resection  S/P colon resection  S/P cholecystectomy  S/P bypass graft of extremity: Left to right femoral-femoral artery bypass graft 10 years ago  S/P CABG x 3      MEDICATIONS  (STANDING):  ALBUTerol/ipratropium for Nebulization 3 milliLiter(s) Nebulizer every 6 hours  aspirin enteric coated 81 milliGRAM(s) Oral daily  atorvastatin 40 milliGRAM(s) Oral at bedtime  carvedilol 18.75 milliGRAM(s) Oral every 12 hours  fenofibrate Tablet 145 milliGRAM(s) Oral daily  furosemide   Injectable 60 milliGRAM(s) IV Push two times a day  gabapentin 100 milliGRAM(s) Oral two times a day  influenza   Vaccine 0.5 milliLiter(s) IntraMuscular once  mineral oil/petrolatum Hydrophilic Ointment 1 Application(s) Topical daily  potassium acid phosphate/sodium acid phosphate tablet (K-PHOS No. 2) 1 Tablet(s) Oral four times a day with meals  potassium chloride    Tablet ER 10 milliEquivalent(s) Oral daily  rivaroxaban 15 milliGRAM(s) Oral with dinner    MEDICATIONS  (PRN):  petrolatum Ophthalmic Ointment 1 Application(s) Both EYES two times a day PRN eye dryness      Allergies: No Known Allergies    Intolerances        Vital Signs Last 24 Hrs  T(C): 36.7 (18 Sep 2019 04:59), Max: 36.7 (17 Sep 2019 14:30)  T(F): 98 (18 Sep 2019 04:59), Max: 98.1 (17 Sep 2019 14:30)  HR: 60 (18 Sep 2019 04:59) (59 - 78)  BP: 123/71 (18 Sep 2019 04:59) (119/66 - 129/62)  BP(mean): --  RR: 18 (18 Sep 2019 04:59) (18 - 18)  SpO2: 98% (18 Sep 2019 04:59) (93% - 99%)    I&O's Summary    17 Sep 2019 07:01  -  18 Sep 2019 07:00  --------------------------------------------------------  IN: 0 mL / OUT: 3300 mL / NET: -3300 mL          PHYSICAL EXAM:    Constitutional: NAD, awake and alert, well-developed  HEENT: Moist Mucous Membranes, Anicteric  Pulmonary: Diminished BS B/L   Cardiovascular: Regular, S1 and S2, No murmurs, rubs, gallops or clicks  Gastrointestinal: Bowel Sounds present, soft, nontender.   Lymph: No peripheral edema. No lymphadenopathy.  Skin: No visible rashes or ulcers.  Psych:  Mood & affect appropriate    LABS: All Labs Reviewed:                        9.1    9.10  )-----------( 293      ( 18 Sep 2019 09:26 )             30.0                   18 Sep 2019 09:26    145    |  99     |  43     ----------------------------<  104    4.2     |  39     |  1.50     Ca    10.2       18 Sep 2019 09:26  Phos  2.3       18 Sep 2019 09:26  Mg     1.9       18 Sep 2019 09:26      Interpretation of Telemetry: Overnight on telemetry v-paced 50-60       ECHO:   < from: TTE Echo Doppler w/o Cont (09.11.19 @ 17:32) >  OBSERVATIONS:  Technically difficult study  Mitral Valve: Thickened leaflets, moderate to severe MR.  Aortic Valve/Aorta: Sclerotic trileaflet aortic valve with normal   opening. Trace AI  Tricuspid Valve: normal with trace TR.  Pulmonic Valve: Mild PI  Left Atrium: Enlarged  Right Atrium: normal  Left Ventricle: Moderate left ventricular systolic dysfunction, estimated   LVEF of 35-40%. Left ventricular hypertrophy and enlargement is noted.   Septal motion consistent with conduction delay  Right Ventricle: The right ventricle is not well visualized. Device wire   seen within the right heart  Pericardium/Pleura: normal, no significant pericardial effusion.  Pulmonary/RV Pressure: estimated PA systolic pressure of 49 mmHg     Conclusion:   Technically difficult study  Moderate left ventricular systolic dysfunction, estimated LVEF of 35-40%.   Left ventricular hypertrophy and enlargement is noted. Septal motion   consistent with conduction delay  Right ventricle is not well visualized. Devicewire is noted within the   right heart  Left atrial enlargement  Sclerotic trileaflet aortic valve with normal opening. Trace AI.   Moderate to severe MR   Trace TR.    Estimated PA systolic pressure of 49 mmHg. The IVC is dilated  No significant pericardial effusion.        Imaging:

## 2019-09-18 NOTE — PROGRESS NOTE ADULT - ATTENDING COMMENTS
Pt seen + examined. Case discussed with intern/resident. Agree with assessment and plan above (edited) with following addendum:  Time spent: 40min. More than 50% of the visit was spent counseling the patient on medical condition -- acute on chronic systolic CHF, cellulitis, pulm nodule.    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  Acute on chronic systolic CHF exacerbation; respiratory distress: continues to have good output  -BNP 74493, however, patient appears clinically improved in terms of volume status with no peripheral edema and improving lung exam -- BUN/Cr trending up  -switch to Lasix 60mg PO BID: will add standing Potassium 10 meq daily  - discuss Entresto with cardio  -echo shows stable EF at 30-35%  - Cardio, consulted recs appreciated: d/w Dr. Madden  -Strict I&Os, daily weights, HOB elevated, negative balance ~ 14 lit.  -Acute cellulitis, s/p traumatic injury requiring 10 sutures   -Left medial LE was warm, tender and erythematous w/ central ulceration on admission & known vascular disease on this leg  - completed course of abx and cellulitis resolved -- will observe off of abx at this time  -Wound care  - new RUL nodule   - pt will f/up with pulm Dr. Singh, as outpt and likely need CT surgery eval as outpt   -pulm recs appreciated  -hematuria improving during the day, monitor H&H, continue A/C for now

## 2019-09-18 NOTE — SWALLOW BEDSIDE ASSESSMENT ADULT - COMMENTS
The patient was seen at bedside this AM for an initial assessment of swallow function, at which time he was alert and cooperative. Patient currently receiving supplemental O2 via NC in place. The patient's wife was present at bedside throughout this evaluation and stating that the patient has difficulty with mastication of hard solids and coughs during consumption of thin liquids.     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 Pt also admits to wheelchair induced injury to the left LE resulting in 10 stiches on 9/4/2019 now pt stating area is cellulitic but it has not been treated outpatient. Per pt, [facility] which resulted in worsening b/l LE edema." Recent CXR revealed, "Pulmonary edema and small bilateral pleural effusions with slight interval improvement." Discussed results and recommendations from this evaluation with the patient, patient's wife, RN, and call out to MD.

## 2019-09-18 NOTE — PROGRESS NOTE ADULT - PROBLEM SELECTOR PLAN 2
Acute, s/p traumatic injury requiring 10 sutures   -Left medial LE warm, tender and erythematous w/ central eschar & known vascular disease on this leg  - completed course of abx this AM; will observe off of abx at this time  -Wound care consulted - p/w generalized weakness. likely multifactorial. acute infection-cellulitis- decompensated heart failure and now also deconditioning  - cellulitis of lower extremity, completed course of oral abx; trend temp/leukocytosis, am labs.  - will monitor lytes/renal function and volume status closely.   - PT brandeeal rec SIMBA

## 2019-09-18 NOTE — PROGRESS NOTE ADULT - PROBLEM SELECTOR PLAN 6
Chronic, rate controlled, on Xarelto   -Continue Xarelto - rate controlled with coreg  -Continue Xarelto for A/C

## 2019-09-18 NOTE — PROVIDER CONTACT NOTE (OTHER) - SITUATION
Bloody alvarado output. Patient agitated, combative
3 beats vtach per tele room
3 beats wide qrs complex per tele room
Tele notified, patient had 3 beats of wide QRS.
multiple 3 beats of VTach at  about 10 min intervals per tele room

## 2019-09-18 NOTE — PROGRESS NOTE ADULT - PROBLEM SELECTOR PLAN 1
- p/w generalized weakness. likely multifactorial. acute infection-cellulitis- decompensated heart failure  - cellulitis of lower extremity, completed course of oral abx; trend temp/leukocytosis, am labs.  - will monitor lytes/renal function and volume status closely.   - PT eval Acute on chronic systolic CHF exacerbation; respiratory distress: continues to have good output  -BNP 33960, however, patient appears clinically improved in terms of volume status with no peripheral edema and improving lung exam -- BUN/Cr trending up  -switch to Lasix 60mg PO BID: will add standing Potassium 10 meq daily  - discuss Entresto with cardio  -echo shows stable EF at 30-35%  - Cardio, consulted recs appreciated: d/w Dr. Madden  -Strict I&Os, daily weights, HOB elevated, negative balance ~ 14 lit.

## 2019-09-18 NOTE — SWALLOW BEDSIDE ASSESSMENT ADULT - SWALLOW EVAL: RECOMMENDED FEEDING/EATING TECHNIQUES
hard swallow w/ each bite or sip/position upright (90 degrees)/crush medication (when feasible)/allow for swallow between intakes/oral hygiene/small sips/bites/maintain upright posture during/after eating for 30 mins/no straws

## 2019-09-18 NOTE — SWALLOW BEDSIDE ASSESSMENT ADULT - ASR SWALLOW ASPIRATION MONITOR
change of breathing pattern/position upright (90Y)/throat clearing/upper respiratory infection/gurgly voice/oral hygiene/fever/pneumonia/cough

## 2019-09-18 NOTE — PROGRESS NOTE ADULT - PROBLEM SELECTOR PLAN 3
Acute on chronic systolic CHF exacerbation; respiratory distress: continues to have good output  -BNP 55530: however patient appears clinically dry w/ an uptrending Crt  -switch to Lasix 60mg PO BID: will add standing Potassium 10 meq daily  -echo shows stable EF at 30-35%  - Cardio, consulted recs appreciated: d/w Dr. Madden  -Strict I&Os, daily weights, HOB elevated, negative balance ~ 14 lit. -Acute cellulitis, s/p traumatic injury requiring 10 sutures   -Left medial LE was warm, tender and erythematous w/ central ulceration on admission & known vascular disease on this leg  - completed course of abx and cellulitis resolved -- will observe off of abx at this time  -Wound care

## 2019-09-18 NOTE — PROGRESS NOTE ADULT - ASSESSMENT
PATIENT EMIGDIO REEVES [facility] P [phone]   1934 DOA 9/10/2019 DR HOLLY LEE                            PULMONARY/CRITICAL CARE ASSESSMENT/RECOMMENDATIONS              85 m 1 ppd smoker quit age 40 retd [facility]  Has home O2  PMH Colon CA 2004 colostomy  (isch colitis AICD CABG 2003A fib Prostate ca Urine retn Self catheterizes PVD Angioplasty R SFA 2019 New 9 mm nodule rul spiculated was admitted with cough sob and was managed for s chf ef 35 copd fev1% 56  cellulitis zosyn then ceeftin               RESP GAS EXCHANGE NEED FOR HOME BPAP   Patient seems to have compensated chronic resp acidosis likely from copd (was smoker retd )   May use bpap as needed if he has increased work breathing   Monitor pulse ox prn sob and adjust O2 to keep po 90-95%  Has been using noct bpap (9/15)  Will benefit from home bpap after discharge If he goes to [facility] wilfredo sparks use bpap at night and duirng day as needed   2019 I have requested sw to try arrange home bpap based on abg 2019 6a bpap .4 740/53/126 After he finishes beth he should have bpap at home   2019 4l 744/62/84 Meets criteria suggested by Boy et al HOT V study LAURA       PLEURAL EFFUSION   Is likely sec to CHF Doubt that it is contributing significantly to dyspnea and even if we drained it it will likely recur as it is probably from his s CHF Will observe at this point    COPD   2018 rodolfo showed mod obstructive pattern   Continue duoneb Under control   LUNG NODULE   Pt has new rul nodule He will need cts eval  He should come see me in office [phone] within 2 w after discharge  A fib   On xarelto   S CHF   9/10 cxr chf   echo ef 35% pasp 49 dilated ivc lae   Lasix 60.2 ()   Cont Rx If creat increases further will need to back off on Lasix ( Cr 1.6  INFECTION   Cellulitis On ceftin    cns blod culture () likely contaminant                       TIME SPENT Over 25 minutes aggregate care time spent on encounter; activities included   direct pt care, counseling and/or coordinating care reviewing notes, lab data/ imaging , discussion with multidisciplinary team/ pt /family. Risks, benefits, alternatives  discussed in detail.

## 2019-09-18 NOTE — SWALLOW BEDSIDE ASSESSMENT ADULT - PHARYNGEAL PHASE
Decreased laryngeal elevation/Delayed pharyngeal swallow Delayed pharyngeal swallow/Decreased laryngeal elevation Delayed pharyngeal swallow/Cough post oral intake/Decreased laryngeal elevation

## 2019-09-18 NOTE — PROGRESS NOTE ADULT - PROBLEM SELECTOR PLAN 8
Chronic  -Pt @ baseline Cr 1.5  -Avoid nephrotoxic drugs -Pt is at baseline Cr ~1.5 -- CKD Stage 3  -Avoid nephrotoxic drugs  -monitor BMP

## 2019-09-18 NOTE — PROGRESS NOTE ADULT - PROBLEM SELECTOR PLAN 9
Chronic, s/p CABG x3 2004  -Continue home ASA 81mg PO qd  -Continue home statin for HLD  - c/w entresto   -Continue fenofibrate 145mg PO qd for HLD -s/p CABG x3 2004  -Continue home ASA 81mg PO qd  -Continue lipitor  - c/w coreg  -Continue fenofibrate 145mg PO qd for HLD

## 2019-09-18 NOTE — PROGRESS NOTE ADULT - ASSESSMENT
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 Admit for cellulitis and decompensated heart failure.    Code Status: family and POA decided for DNR, DNI. MOLST reviewed and spoke to family at least for 30 min regarding MOSLT review and code status, they understood and agreed with above plan..     Moderate calory and protein malnutrition: started on supplementary diet. f/u with nutrition service.     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    Code Status: family and POA decided for DNR/DNI.

## 2019-09-19 DIAGNOSIS — J44.9 CHRONIC OBSTRUCTIVE PULMONARY DISEASE, UNSPECIFIED: ICD-10-CM

## 2019-09-19 LAB
ALBUMIN SERPL ELPH-MCNC: 3 G/DL — LOW (ref 3.3–5)
ALP SERPL-CCNC: 38 U/L — LOW (ref 40–120)
ALT FLD-CCNC: 15 U/L — SIGNIFICANT CHANGE UP (ref 12–78)
ANION GAP SERPL CALC-SCNC: 3 MMOL/L — LOW (ref 5–17)
AST SERPL-CCNC: 22 U/L — SIGNIFICANT CHANGE UP (ref 15–37)
BILIRUB SERPL-MCNC: 0.6 MG/DL — SIGNIFICANT CHANGE UP (ref 0.2–1.2)
BUN SERPL-MCNC: 43 MG/DL — HIGH (ref 7–23)
CALCIUM SERPL-MCNC: 10.1 MG/DL — SIGNIFICANT CHANGE UP (ref 8.5–10.1)
CHLORIDE SERPL-SCNC: 99 MMOL/L — SIGNIFICANT CHANGE UP (ref 96–108)
CO2 SERPL-SCNC: 44 MMOL/L — HIGH (ref 22–31)
CREAT SERPL-MCNC: 1.4 MG/DL — HIGH (ref 0.5–1.3)
GLUCOSE SERPL-MCNC: 115 MG/DL — HIGH (ref 70–99)
HCT VFR BLD CALC: 30.8 % — LOW (ref 39–50)
HGB BLD-MCNC: 9.3 G/DL — LOW (ref 13–17)
MAGNESIUM SERPL-MCNC: 2 MG/DL — SIGNIFICANT CHANGE UP (ref 1.6–2.6)
MCHC RBC-ENTMCNC: 30 PG — SIGNIFICANT CHANGE UP (ref 27–34)
MCHC RBC-ENTMCNC: 30.2 GM/DL — LOW (ref 32–36)
MCV RBC AUTO: 99.4 FL — SIGNIFICANT CHANGE UP (ref 80–100)
NRBC # BLD: 0 /100 WBCS — SIGNIFICANT CHANGE UP (ref 0–0)
PHOSPHATE SERPL-MCNC: 3.2 MG/DL — SIGNIFICANT CHANGE UP (ref 2.5–4.5)
PLATELET # BLD AUTO: 273 K/UL — SIGNIFICANT CHANGE UP (ref 150–400)
POTASSIUM SERPL-MCNC: 3.9 MMOL/L — SIGNIFICANT CHANGE UP (ref 3.5–5.3)
POTASSIUM SERPL-SCNC: 3.9 MMOL/L — SIGNIFICANT CHANGE UP (ref 3.5–5.3)
PROT SERPL-MCNC: 7.1 G/DL — SIGNIFICANT CHANGE UP (ref 6–8.3)
RBC # BLD: 3.1 M/UL — LOW (ref 4.2–5.8)
RBC # FLD: 15.6 % — HIGH (ref 10.3–14.5)
SODIUM SERPL-SCNC: 146 MMOL/L — HIGH (ref 135–145)
WBC # BLD: 9.17 K/UL — SIGNIFICANT CHANGE UP (ref 3.8–10.5)
WBC # FLD AUTO: 9.17 K/UL — SIGNIFICANT CHANGE UP (ref 3.8–10.5)

## 2019-09-19 PROCEDURE — 99233 SBSQ HOSP IP/OBS HIGH 50: CPT | Mod: GC

## 2019-09-19 PROCEDURE — 99232 SBSQ HOSP IP/OBS MODERATE 35: CPT

## 2019-09-19 RX ORDER — SACUBITRIL AND VALSARTAN 24; 26 MG/1; MG/1
1 TABLET, FILM COATED ORAL
Qty: 0 | Refills: 0 | DISCHARGE

## 2019-09-19 RX ORDER — FONDAPARINUX SODIUM 2.5 MG/.5ML
1 INJECTION, SOLUTION SUBCUTANEOUS
Qty: 0 | Refills: 0 | DISCHARGE

## 2019-09-19 RX ORDER — SACUBITRIL AND VALSARTAN 24; 26 MG/1; MG/1
2 TABLET, FILM COATED ORAL
Refills: 0 | Status: DISCONTINUED | OUTPATIENT
Start: 2019-09-19 | End: 2019-09-20

## 2019-09-19 RX ORDER — ATORVASTATIN CALCIUM 80 MG/1
1 TABLET, FILM COATED ORAL
Qty: 0 | Refills: 0 | DISCHARGE

## 2019-09-19 RX ORDER — GABAPENTIN 400 MG/1
3 CAPSULE ORAL
Qty: 0 | Refills: 0 | DISCHARGE

## 2019-09-19 RX ORDER — SACUBITRIL AND VALSARTAN 24; 26 MG/1; MG/1
1 TABLET, FILM COATED ORAL
Refills: 0 | Status: DISCONTINUED | OUTPATIENT
Start: 2019-09-19 | End: 2019-09-19

## 2019-09-19 RX ORDER — FENOFIBRATE,MICRONIZED 130 MG
1 CAPSULE ORAL
Qty: 0 | Refills: 0 | DISCHARGE

## 2019-09-19 RX ORDER — TRAMADOL HYDROCHLORIDE 50 MG/1
1 TABLET ORAL
Qty: 0 | Refills: 0 | DISCHARGE

## 2019-09-19 RX ORDER — MOMETASONE FUROATE 220 UG/1
1 INHALANT RESPIRATORY (INHALATION)
Qty: 0 | Refills: 0 | DISCHARGE

## 2019-09-19 RX ADMIN — RIVAROXABAN 15 MILLIGRAM(S): KIT at 18:20

## 2019-09-19 RX ADMIN — CARVEDILOL PHOSPHATE 18.75 MILLIGRAM(S): 80 CAPSULE, EXTENDED RELEASE ORAL at 06:21

## 2019-09-19 RX ADMIN — ATORVASTATIN CALCIUM 40 MILLIGRAM(S): 80 TABLET, FILM COATED ORAL at 21:30

## 2019-09-19 RX ADMIN — Medication 81 MILLIGRAM(S): at 13:19

## 2019-09-19 RX ADMIN — Medication 600 MILLIGRAM(S): at 06:21

## 2019-09-19 RX ADMIN — Medication 600 MILLIGRAM(S): at 18:20

## 2019-09-19 RX ADMIN — CARVEDILOL PHOSPHATE 18.75 MILLIGRAM(S): 80 CAPSULE, EXTENDED RELEASE ORAL at 18:21

## 2019-09-19 RX ADMIN — Medication 3 MILLILITER(S): at 19:57

## 2019-09-19 RX ADMIN — Medication 10 MILLIEQUIVALENT(S): at 13:19

## 2019-09-19 RX ADMIN — SACUBITRIL AND VALSARTAN 2 TABLET(S): 24; 26 TABLET, FILM COATED ORAL at 18:20

## 2019-09-19 RX ADMIN — Medication 60 MILLIGRAM(S): at 06:21

## 2019-09-19 RX ADMIN — GABAPENTIN 100 MILLIGRAM(S): 400 CAPSULE ORAL at 06:21

## 2019-09-19 RX ADMIN — Medication 145 MILLIGRAM(S): at 13:19

## 2019-09-19 RX ADMIN — Medication 3 MILLILITER(S): at 13:42

## 2019-09-19 RX ADMIN — GABAPENTIN 100 MILLIGRAM(S): 400 CAPSULE ORAL at 18:24

## 2019-09-19 RX ADMIN — Medication 3 MILLILITER(S): at 07:26

## 2019-09-19 RX ADMIN — Medication 1 APPLICATION(S): at 13:17

## 2019-09-19 NOTE — PROGRESS NOTE ADULT - PROBLEM SELECTOR PLAN 10
DVT ppx: Continue home Xarelto  Fall precautions    11. Pulmonary nodule:  - new RUL nodule   - pt will f/up with pulm Dr. Singh, as outpt and likely need CT surgery eval as outpt   -pulm recs appreciated    12. Chronic urinary retention:   -Alvarado inserted this admission; hematuria today, maybe due to alvarado trauma;   -will monitor H&H   -consider adjustments to a/c and possible urology consult if hematuria persists  - becoming lighter / tea-colored during the course of the day    13. EKG abnormalities:   - LBBB noted on EKG. not on prior EKG 2018; no other signs of acute ischemia  - no active CP, SOB   - CE negative x 3  - cardio recs appreciated DVT ppx: Continue home Xarelto  Fall precautions    11. Pulmonary nodule:  - new RUL nodule   - pt will f/up with pulm Dr. Singh, as outpt and likely need CT surgery eval as outpt   -pulm recs appreciated    12. Chronic urinary retention:   -Alvarado inserted this admission; hematuria today, maybe due to alvarado trauma  -monitor H&H (stable from yesterday), continue A/C for now  -consider adjustments to a/c and possible urology consult if hematuria persists  - becoming lighter / tea-colored during the course of the day    13. EKG abnormalities:   - LBBB noted on EKG. not on prior EKG 2018; no other signs of acute ischemia  - no active CP, SOB   - CE negative x 3  - cardio recs appreciated

## 2019-09-19 NOTE — PROGRESS NOTE ADULT - SUBJECTIVE AND OBJECTIVE BOX
Pt examined Note to follow EMIGDIO REEVES [facility] [phone]   1934 DOA 9/10/2019 DR HOLLY ZIEGLER   ALLERGY     nka    CONTACT     sp Esperanza RODRIGUEZ       Initial evaluation/Pulmonary Critical Care consultation requested on 2019   by Dr Ziegler  from Dr Singh   Patient examined chart reviewed    HOSPITAL ADMISSION   PATIENT CAME  FROM (if information available)        TYPE OF VISIT      Subsequent Pulmonary followup     REASON FOR VISIT  PLEASE SEE PROBLEM LIST/ASSESSMENT AND RECOMMENDATIONS     PATIENT EMIGDIO REEVES [facility] P [phone]   1934 DOA 9/10/2019 DR HOLLY ZIEGLER     VITALS/LABS       2019 afeb 60 120/70 18 98%   2019 W 9.1 Hb 9.1 Plt 293 Na 145 K 3.2 CO2 39 Cr 1.5       REVIEW OF SYMPTOMS     NOTE Noteworthy changes  if any  in ROS and PE are also entered  in note  below      Able to give ROS  Yes     RELIABLE No   CONSTITUTIONAL Weakness Yes  Chills No Vision changes No  ENDOCRINE No unexplained hair loss No heat or cold intolerance    ALLERGY No hives  Sore throat No   RESP Coughing blood no  Shortness of breath YES   NEURO No Headache  Confusion Pain neck No   CARDIAC No Chest pain No Palpitations   GI No Pain abdomen NO   Vomiting NO     PHYSICAL EXAM    HEENT Unremarkable PERRLA atraumatic   RESP Fair air entry EXP prolonged    Harsh breath sound Resp distres mild   CARDIAC S1 S2 No S3     NO JVD    ABDOMEN SOFT BS PRESENT NOT DISTENDED No hepatosplenomegaly PEDAL EDEMA present No calf tenderness  NO rash   GENERAL Not TOXIC looking

## 2019-09-19 NOTE — PROGRESS NOTE ADULT - SUBJECTIVE AND OBJECTIVE BOX
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 Pt also admits to wheelchair induced injury to the left LE resulting in 10 stiches on 9/4/2019 now pt stating area is cellulitic but it has not been treated outpatient. Per pt, [facility] which resulted in worsening b/l LE edema.     INTERVAL EVENTS:  Patient seen and examined.  Reports unchanged SOB and cough.  Denies fever/chills, n/v/d/c, denies dysuria.    REVIEW OF SYSTEMS:    CONSTITUTIONAL: No weakness, fevers or chills  EYES/ENT: No visual changes, no throat pain   RESPIRATORY: + cough, wheezing, shortness of breath; No hemoptysis  CARDIOVASCULAR: No chest pain or palpitations  GASTROINTESTINAL: No abdominal pain, nausea, vomiting, or hematemesis; No diarrhea or constipation. No melena or hematochezia.  GENITOURINARY: + hematuria; No dysuria, frequency   All other review of systems is negative unless indicated above.    VITAL SIGNS:  T(C): 36.8 (09-19-19 @ 13:26), Max: 36.8 (09-18-19 @ 22:31)  HR: 60 (09-19-19 @ 13:47) (59 - 64)  BP: 106/56 (09-19-19 @ 13:26) (106/56 - 137/81)  RR: 17 (09-19-19 @ 13:26) (16 - 17)  SpO2: 97% (09-19-19 @ 13:47) (93% - 99%)    PHYSICAL EXAM:     GENERAL: no acute distress  HEENT: NC/AT, EOMI, neck supple, MMM  RESPIRATORY: coarse breath sounds throughout; diffuse wheezing; decrease BS at bases bilaterally  CARDIOVASCULAR: irregularly irregular; no murmurs, gallops, rubs  ABDOMINAL: soft, non-tender, non-distended, positive bowel sounds   EXTREMITIES: no clubbing, cyanosis, or edema: LLE dressing c/d/i; w/ no surrounding erythema  NEUROLOGICAL: alert and oriented x 3, non-focal                          9.3    9.17  )-----------( 273      ( 19 Sep 2019 09:03 )             30.8     09-19    146<H>  |  99  |  43<H>  ----------------------------<  115<H>  3.9   |  44<H>  |  1.40<H>    Ca    10.1      19 Sep 2019 09:03  Phos  3.2     09-19  Mg     2.0     09-19    TPro  7.1  /  Alb  3.0<L>  /  TBili  0.6  /  DBili  x   /  AST  22  /  ALT  15  /  AlkPhos  38<L>  09-19      CAPILLARY BLOOD GLUCOSE          MEDICATIONS  (STANDING):  ALBUTerol/ipratropium for Nebulization 3 milliLiter(s) Nebulizer every 6 hours  aspirin enteric coated 81 milliGRAM(s) Oral daily  atorvastatin 40 milliGRAM(s) Oral at bedtime  carvedilol 18.75 milliGRAM(s) Oral every 12 hours  fenofibrate Tablet 145 milliGRAM(s) Oral daily  gabapentin 100 milliGRAM(s) Oral two times a day  guaiFENesin  milliGRAM(s) Oral every 12 hours  influenza   Vaccine 0.5 milliLiter(s) IntraMuscular once  mineral oil/petrolatum Hydrophilic Ointment 1 Application(s) Topical daily  potassium chloride    Tablet ER 10 milliEquivalent(s) Oral daily  rivaroxaban 15 milliGRAM(s) Oral with dinner  sacubitril 24 mG/valsartan 26 mG 2 Tablet(s) Oral two times a day Patient is a 85y old  Male who presents with a chief complaint of cough, leg swelling, and cellulitis.    INTERVAL EVENTS:  Patient seen and examined at bedside.  Pt noted to have some blood (tea-colored urine) in alvarado.  Patient denies urinary complaints.  He reports gradual improvement in SOB and cough, and is not currently feeling SOB at rest  He denies fever/chills, chest pain, heart palpitations.     REVIEW OF SYSTEMS:    CONSTITUTIONAL: No weakness, fevers or chills  EYES/ENT: No visual changes, no throat pain   RESPIRATORY: cough and SOB are improving; No hemoptysis  CARDIOVASCULAR: No chest pain or palpitations  GASTROINTESTINAL: No abdominal pain, nausea, vomiting, or hematemesis; No diarrhea or constipation. No melena or hematochezia.  GENITOURINARY: + hematuria; No dysuria, frequency     VITAL SIGNS:  T(C): 36.8 (09-19-19 @ 13:26), Max: 36.8 (09-18-19 @ 22:31)  HR: 60 (09-19-19 @ 13:47) (59 - 64)  BP: 106/56 (09-19-19 @ 13:26) (106/56 - 137/81)  RR: 17 (09-19-19 @ 13:26) (16 - 17)  SpO2: 97% (09-19-19 @ 13:47) (93% - 99%)    PHYSICAL EXAM:     GENERAL: no acute distress  HEENT: NC/AT, anicteric, neck supple, MMM  RESPIRATORY: scattered coarse breath sounds; rare wheezes; decreased BS   CARDIOVASCULAR: irregularly irregular, S1, S2  ABDOMINAL: soft, non-tender, non-distended, positive bowel sounds   EXTREMITIES: no clubbing, cyanosis, or edema: LLE dressing c/d/i; wound closed w/ no surrounding erythema  NEUROLOGICAL: answering questions and following commands appropriately                          9.3    9.17  )-----------( 273      ( 19 Sep 2019 09:03 )             30.8     09-19    146<H>  |  99  |  43<H>  ----------------------------<  115<H>  3.9   |  44<H>  |  1.40<H>    Ca    10.1      19 Sep 2019 09:03  Phos  3.2     09-19  Mg     2.0     09-19    TPro  7.1  /  Alb  3.0<L>  /  TBili  0.6  /  DBili  x   /  AST  22  /  ALT  15  /  AlkPhos  38<L>  09-19      CAPILLARY BLOOD GLUCOSE        MEDICATIONS  (STANDING):  ALBUTerol/ipratropium for Nebulization 3 milliLiter(s) Nebulizer every 6 hours  aspirin enteric coated 81 milliGRAM(s) Oral daily  atorvastatin 40 milliGRAM(s) Oral at bedtime  carvedilol 18.75 milliGRAM(s) Oral every 12 hours  fenofibrate Tablet 145 milliGRAM(s) Oral daily  gabapentin 100 milliGRAM(s) Oral two times a day  guaiFENesin  milliGRAM(s) Oral every 12 hours  influenza   Vaccine 0.5 milliLiter(s) IntraMuscular once  mineral oil/petrolatum Hydrophilic Ointment 1 Application(s) Topical daily  potassium chloride    Tablet ER 10 milliEquivalent(s) Oral daily  rivaroxaban 15 milliGRAM(s) Oral with dinner  sacubitril 24 mG/valsartan 26 mG 2 Tablet(s) Oral two times a day    MEDICATIONS  (PRN):  petrolatum Ophthalmic Ointment 1 Application(s) Both EYES two times a day PRN eye dryness

## 2019-09-19 NOTE — ADVANCED PRACTICE NURSE CONSULT - ASSESSMENT
late note: Patient is an 86yo male admitted with cellulitis appears to be resolved patient presents with sutured  LLE skin tear. Skin tear scabbed over, no warmth, erythema, odor noted patient denies pain/discomfort I cleaned wound and sutures removed by MD.

## 2019-09-19 NOTE — PROGRESS NOTE ADULT - PROBLEM SELECTOR PLAN 9
-s/p CABG x3 2004  -Continue home ASA 81mg PO qd  -Continue lipitor  - c/w coreg  -Continue fenofibrate 145mg PO qd for HLD

## 2019-09-19 NOTE — PROGRESS NOTE ADULT - ASSESSMENT
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 Overall, he seems to be improving.  Strict intake and output, 1.2 liter fluid restriction   Hypokalemia noted- supplement to keep K > 4.0 and Mg >2.0 AM labs pending   Continue with ASA Coreg Lipitor Fenofibrate, no ace arb given CKD    ID LE Cellulitis  Follow up Blood cultures  Antibiotics as per primary team     COPD  Supplemental O2  nebulizers PRn  Pulmonary following     Anemia  Anemia work up as per primary team  Keep hgb > 8 given CAD history   IF transfusing given split units, lasix in between and frequent lung checks     Afib  Rate controlled on Coreg  Thromboembolism ppx on Xarelto PO    HLD  Continue with Lipitor    CKD  Baseline cr 1.5  Daily renal profile     Family requesting palliative consultation for CHF  PT   Fall precautions  Consider Speech and Swallow evaluation   AM labs pending - further recommendations pending review of am labs     Stefan Miles [facility]  Cardiology   Spectra #[phone] 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  - CW entresto  -Strict intake and output, 1.2 liter fluid restriction   -Continue with ASA Coreg Lipitor Fenofibrate,    ID LE Cellulitis  Follow up Blood cultures  Antibiotics as per primary team     COPD  Supplemental O2  nebulizers PRn  Pulmonary following     Anemia  Anemia work up as per primary team  Keep hgb > 8 given CAD history   IF transfusing given split units, lasix in between and frequent lung checks     Afib  Rate controlled on Coreg  cw  Xarelto PO    HLD  Continue with Lipitor    CKD  Baseline cr 1.5  Daily renal profile     Family requesting palliative consultation for CHF  PT   Fall precautions  Consider Speech and Swallow evaluation   AM labs pending - further recommendations pending review of am labs     Stefan Miles [facility]  Cardiology   Spectra #[phone] 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  - CW entresto  - Strict intake and output, 1.2 liter fluid restriction   - Continue with ASA Coreg Lipitor Fenofibrate,  - Holding lasix given cr and HCO3. likely will need to resume a low dose soon.     ID LE Cellulitis  Follow up Blood cultures  Antibiotics as per primary team     COPD  Supplemental O2  nebulizers PRn  Pulmonary following     Anemia  Anemia work up as per primary team  Keep hgb > 8 given CAD history   IF transfusing given split units, lasix in between and frequent lung checks     Afib  Rate controlled on Coreg  cw  Xarelto PO    HLD  Continue with Lipitor    CKD  Baseline cr 1.5  Daily renal profile     Family requesting palliative consultation for CHF  PT   Fall precautions  Consider Speech and Swallow evaluation   AM labs pending - further recommendations pending review of am labs     Stefan Miles [facility]  Cardiology   Spectra #[phone]

## 2019-09-19 NOTE — PROGRESS NOTE ADULT - PROBLEM SELECTOR PLAN 5
-Pt received transfusions x3 08/2019 which resulted in worsening b/l LE edema   -Monitor H/H , type and screen  -Transfuse PRN  - B12 levels WNL -Pt received transfusions x3 08/2019 which resulted in worsening b/l LE edema   -Monitor H/H, type and screen  -Transfuse PRN  - B12 levels WNL

## 2019-09-19 NOTE — PROGRESS NOTE ADULT - PROBLEM SELECTOR PLAN 2
- p/w generalized weakness. likely multifactorial. acute infection-cellulitis- decompensated heart failure and now also deconditioning  - cellulitis of lower extremity, completed course of oral abx; trend temp/leukocytosis, am labs.  - will monitor lytes/renal function and volume status closely.   - PT brandeeal rec SIMBA

## 2019-09-19 NOTE — PROGRESS NOTE ADULT - ASSESSMENT
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    Code Status: family and POA decided for DNR/DNI.

## 2019-09-19 NOTE — PROGRESS NOTE ADULT - ASSESSMENT
PATIENT EMIGDIO REEVES [facility] P [phone]   1934 DOA 9/10/2019 DR HOLLY LEE                            PULMONARY/CRITICAL CARE ASSESSMENT/RECOMMENDATIONS              85 m 1 ppd smoker quit age 40 retd [facility]  Has home O2  PMH Colon CA 2004 colostomy  (isch colitis AICD CABG A fib Prostate ca Urine retn Self catheterizes PVD Angioplasty R SFA 2019 New 9 mm nodule rul spiculated was admitted with cough sob and was managed for s chf ef 35 copd fev1% 56  cellulitis zosyn then ceeftin               RESP GAS EXCHANGE NEED FOR HOME BPAP   Patient seems to have compensated chronic resp acidosis likely from copd (was smoker retd )   May use bpap as needed if he has increased work breathing   Monitor pulse ox prn sob and adjust O2 to keep po 90-95%  Has been using noct bpap (9/15)  Will benefit from home bpap after discharge If he goes to [facility]o sparks use bpap at night and duirng day as needed   2019 I have requested  to try arrange home bpap based on abg 2019 6a bpap .4 740/53/126 After he finishes beth he should have bpap at home   2019 4l 744/62/84 Meets criteria suggested by Boy et al HOT [facility] study LAURA       PLEURAL EFFUSION   Is likely sec to CHF Doubt that it is contributing significantly to dyspnea and even if we drained it it will likely recur as it is probably from his s CHF Will observe at this point    COPD   2018 rodolfo showed mod obstructive pattern   Continue duoneb Under control   LUNG NODULE   Pt has new rul nodule He will need cts eval  He should come see me in office [phone] within 2 w after discharge  A fib   On xarelto   S CHF   9/10 cxr chf   echo ef 35% pasp 49 dilated ivc lae   Lasix 60.2 (-) ( Cr 1.6  Started enteresto 2019   Follow with cardio  INFECTION   Cellulitis sp ceftin course    cns blod culture () likely contaminant                       DISPOSITION  CHF meds being adjusted [facility]   COPD Cont BD  Chr resp failure hypercapnic Home bpap if it can be arranged   LUNG NODULE tO see me in 2 w as outpt     TIME SPENT Over 25 minutes aggregate care time spent on encounter; activities included   direct pt care, counseling and/or coordinating care reviewing notes, lab data/ imaging , discussion with multidisciplinary team/ pt /family. Risks, benefits, alternatives  discussed in detail.

## 2019-09-19 NOTE — PROGRESS NOTE ADULT - ATTENDING COMMENTS
Pt seen + examined. Case discussed with intern/resident. Agree with assessment and plan above (edited) with following addendum:  Time spent: 40min. More than 50% of the visit was spent counseling the patient on medical condition -- acute on chronic systolic CHF, cellulitis, pulm nodule.    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  -Acute on chronic systolic CHF exacerbation; respiratory distress: continues to have good output  -last BNP 62139, however, patient appears clinically improved in terms of volume status with no peripheral edema and improving lung exam -- BUN/Cr ratio, and bicarb trending up -- pt has reportedly lost almost 20kg in the week he has been diuresed as inpt  - restarted pt's home Entresto for diuresis maintenance and d/c Lasix  -echo shows stable EF at 30-35%  - Cardio, consulted recs appreciated: d/w Dr. Clark  -Strict I&Os, daily weights, HOB elevated, negative balance >14 liters  -Acute cellulitis, s/p traumatic injury requiring 10 sutures   -Left medial LE was warm, tender and erythematous w/ central ulceration on admission & known vascular disease on this leg  - completed course of abx and cellulitis resolved -- will observe off of abx at this time  -Wound care  - new RUL nodule   - pt will f/up with pulm Dr. Singh, as outpt and likely need CT surgery eval as outpt   -pulm recs appreciated  -hematuria improving (tea-colored urine), monitor H&H (stable from yesterday), continue A/C for now

## 2019-09-19 NOTE — PROGRESS NOTE ADULT - PROBLEM SELECTOR PLAN 3
-Acute cellulitis, s/p traumatic injury requiring 10 sutures   -Left medial LE was warm, tender and erythematous w/ central ulceration on admission & known vascular disease on this leg  - completed course of abx and cellulitis resolved -- will observe off of abx at this time  -Wound care

## 2019-09-19 NOTE — PROGRESS NOTE ADULT - PROBLEM SELECTOR PLAN 1
Acute on chronic systolic CHF exacerbation; respiratory distress: continues to have good output  -BNP 06686, however, patient appears clinically improved in terms of volume status with no peripheral edema and improving lung exam -- BUN/Cr trending up  - switched to Entresto: d/c Lasix  -echo shows stable EF at 30-35%  - Cardio, consulted recs appreciated: d/w Dr. Madden  -Strict I&Os, daily weights, HOB elevated, negative balance ~ 14 lit. -Acute on chronic systolic CHF exacerbation; respiratory distress: continues to have good output  -last BNP 82849, however, patient appears clinically improved in terms of volume status with no peripheral edema and improving lung exam -- BUN/Cr ratio, and bicarb trending up -- pt has reportedly lost almost 20kg in the week he has been diuresed as inpt  - restarted pt's home Entresto for diuresis maintenance and d/c Lasix  -echo shows stable EF at 30-35%  - Cardio, consulted recs appreciated: d/w Dr. Clark  -Strict I&Os, daily weights, HOB elevated, negative balance >14 liters

## 2019-09-19 NOTE — PROGRESS NOTE ADULT - SUBJECTIVE AND OBJECTIVE BOX
[facility] -- Claudio Gilliland, Maryanne, Cuong Sands Patel, Savella  Office # [phone]      Follow Up:    CHF  Subjective/Observations:   No events overnight resting comfortably in bed.  No complaints of chest pain, dyspnea, or palpitations reported. No signs of orthopnea or PND.     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  Ischemic bowel disease  Colon cancer: s/p chemo  Automatic implantable cardioverter-defibrillator in situ  Acute MI: s/p CABG x3  Hypertension  Congestive heart failure  Insomnia  Anemia, vitamin B12 deficiency  HTN (hypertension)  Ischemic colitis, enteritis, or enterocolitis: s/p partial colectomy  Vitamin B 12 deficiency  Prostate cancer: s/p radiation  Afib  CKD (chronic kidney disease)  MI (myocardial infarction)  PAD (peripheral artery disease): s/p stent, fem-fem bypass  CAD (coronary artery disease)  Peripheral Neuropathy  COPD (chronic obstructive pulmonary disease)  Colostomy care  Cataract extraction status of eye, right  S/P colostomy  S/P cardiac pacemaker procedure: AICD  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  S/P small bowel resection  S/P colon resection  S/P cholecystectomy  S/P bypass graft of extremity: Left to right femoral-femoral artery bypass graft 10 years ago  S/P CABG x 3      MEDICATIONS  (STANDING):  ALBUTerol/ipratropium for Nebulization 3 milliLiter(s) Nebulizer every 6 hours  aspirin enteric coated 81 milliGRAM(s) Oral daily  atorvastatin 40 milliGRAM(s) Oral at bedtime  carvedilol 18.75 milliGRAM(s) Oral every 12 hours  fenofibrate Tablet 145 milliGRAM(s) Oral daily  gabapentin 100 milliGRAM(s) Oral two times a day  guaiFENesin  milliGRAM(s) Oral every 12 hours  influenza   Vaccine 0.5 milliLiter(s) IntraMuscular once  mineral oil/petrolatum Hydrophilic Ointment 1 Application(s) Topical daily  potassium chloride    Tablet ER 10 milliEquivalent(s) Oral daily  rivaroxaban 15 milliGRAM(s) Oral with dinner  sacubitril 24 mG/valsartan 26 mG 2 Tablet(s) Oral two times a day    MEDICATIONS  (PRN):  petrolatum Ophthalmic Ointment 1 Application(s) Both EYES two times a day PRN eye dryness      Allergies    No Known Allergies    Intolerances        Vital Signs Last 24 Hrs  T(C): 36.8 (19 Sep 2019 13:26), Max: 36.8 (18 Sep 2019 22:31)  T(F): 98.2 (19 Sep 2019 13:26), Max: 98.2 (18 Sep 2019 22:31)  HR: 60 (19 Sep 2019 13:47) (59 - 64)  BP: 106/56 (19 Sep 2019 13:26) (106/56 - 137/81)  BP(mean): 93 (19 Sep 2019 05:49) (84 - 93)  RR: 17 (19 Sep 2019 13:26) (16 - 17)  SpO2: 97% (19 Sep 2019 13:47) (93% - 99%)    I&O's Summary    18 Sep 2019 07:01  -  19 Sep 2019 07:00  --------------------------------------------------------  IN: 0 mL / OUT: 2301 mL / NET: -2301 mL    19 Sep 2019 07:01  -  19 Sep 2019 14:41  --------------------------------------------------------  IN: 0 mL / OUT: 1200 mL / NET: -1200 mL          PHYSICAL EXAM:  TELE:   Constitutional: NAD, awake and alert, well-developed  HEENT: Moist Mucous Membranes, Anicteric  Pulmonary: Non-labored, breath sounds are clear bilaterally, No wheezing, crackles or rhonchi  Cardiovascular: Regular, S1 and S2 nl, + murmur No rubs, gallops or clicks   Gastrointestinal: Bowel Sounds present, soft, nontender.   Lymph: No lymphadenopathy. No peripheral edema.  Skin: No visible rashes or ulcers.  Psych:  Mood & affect appropriate    LABS: All Labs Reviewed:                        9.3    9.17  )-----------( 273      ( 19 Sep 2019 09:03 )             30.8                         9.1    9.10  )-----------( 293      ( 18 Sep 2019 09:26 )             30.0                         8.7    8.78  )-----------( 302      ( 17 Sep 2019 10:13 )             28.6     19 Sep 2019 09:03    146    |  99     |  43     ----------------------------<  115    3.9     |  44     |  1.40   18 Sep 2019 09:26    145    |  99     |  43     ----------------------------<  104    4.2     |  39     |  1.50   17 Sep 2019 10:14    142    |  96     |  37     ----------------------------<  88     3.3     |  41     |  1.20     Ca    10.1       19 Sep 2019 09:03  Ca    10.2       18 Sep 2019 09:26  Ca    9.5        17 Sep 2019 10:14  Phos  3.2       19 Sep 2019 09:03  Phos  2.3       18 Sep 2019 09:26  Mg     2.0       19 Sep 2019 09:03  Mg     1.9       18 Sep 2019 09:26    TPro  7.1    /  Alb  3.0    /  TBili  0.6    /  DBili  x      /  AST  22     /  ALT  15     /  AlkPhos  38     19 Sep 2019 09:03        ECHO:   < from: TTE Echo Doppler w/o Cont (09.11.19 @ 17:32) >  OBSERVATIONS:  Technically difficult study  Mitral Valve: Thickened leaflets, moderate to severe MR.  Aortic Valve/Aorta: Sclerotic trileaflet aortic valve with normal   opening. Trace AI  Tricuspid Valve: normal with trace TR.  Pulmonic Valve: Mild PI  Left Atrium: Enlarged  Right Atrium: normal  Left Ventricle: Moderate left ventricular systolic dysfunction, estimated   LVEF of 35-40%. Left ventricular hypertrophy and enlargement is noted.   Septal motion consistent with conduction delay  Right Ventricle: The right ventricle is not well visualized. Device wire   seen within the right heart  Pericardium/Pleura: normal, no significant pericardial effusion.  Pulmonary/RV Pressure: estimated PA systolic pressure of 49 mmHg     Conclusion:   Technically difficult study  Moderate left ventricular systolic dysfunction, estimated LVEF of 35-40%.   Left ventricular hypertrophy and enlargement is noted. Septal motion   consistent with conduction delay  Right ventricle is not well visualized. Devicewire is noted within the   right heart  Left atrial enlargement  Sclerotic trileaflet aortic valve with normal opening. Trace AI.   Moderate to severe MR   Trace TR.    Estimated PA systolic pressure of 49 mmHg. The IVC is dilated  No significant pericardial effusion. [facility] -- Claudio Gilliland, Marynane, Cuong Sands Patel, Savella  Office # [phone]      Follow Up:    CHF  Subjective/Observations:   No events overnight resting comfortably in bed.  No complaints of chest pain, dyspnea, or palpitations reported. No signs of orthopnea or PND.     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  Ischemic bowel disease  Colon cancer: s/p chemo  Automatic implantable cardioverter-defibrillator in situ  Acute MI: s/p CABG x3  Hypertension  Congestive heart failure  Insomnia  Anemia, vitamin B12 deficiency  HTN (hypertension)  Ischemic colitis, enteritis, or enterocolitis: s/p partial colectomy  Vitamin B 12 deficiency  Prostate cancer: s/p radiation  Afib  CKD (chronic kidney disease)  MI (myocardial infarction)  PAD (peripheral artery disease): s/p stent, fem-fem bypass  CAD (coronary artery disease)  Peripheral Neuropathy  COPD (chronic obstructive pulmonary disease)  Colostomy care  Cataract extraction status of eye, right  S/P colostomy  S/P cardiac pacemaker procedure: AICD  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  S/P small bowel resection  S/P colon resection  S/P cholecystectomy  S/P bypass graft of extremity: Left to right femoral-femoral artery bypass graft 10 years ago  S/P CABG x 3      MEDICATIONS  (STANDING):  ALBUTerol/ipratropium for Nebulization 3 milliLiter(s) Nebulizer every 6 hours  aspirin enteric coated 81 milliGRAM(s) Oral daily  atorvastatin 40 milliGRAM(s) Oral at bedtime  carvedilol 18.75 milliGRAM(s) Oral every 12 hours  fenofibrate Tablet 145 milliGRAM(s) Oral daily  gabapentin 100 milliGRAM(s) Oral two times a day  guaiFENesin  milliGRAM(s) Oral every 12 hours  influenza   Vaccine 0.5 milliLiter(s) IntraMuscular once  mineral oil/petrolatum Hydrophilic Ointment 1 Application(s) Topical daily  potassium chloride    Tablet ER 10 milliEquivalent(s) Oral daily  rivaroxaban 15 milliGRAM(s) Oral with dinner  sacubitril 24 mG/valsartan 26 mG 2 Tablet(s) Oral two times a day    MEDICATIONS  (PRN):  petrolatum Ophthalmic Ointment 1 Application(s) Both EYES two times a day PRN eye dryness      Allergies    No Known Allergies    Intolerances        Vital Signs Last 24 Hrs  T(C): 36.8 (19 Sep 2019 13:26), Max: 36.8 (18 Sep 2019 22:31)  T(F): 98.2 (19 Sep 2019 13:26), Max: 98.2 (18 Sep 2019 22:31)  HR: 60 (19 Sep 2019 13:47) (59 - 64)  BP: 106/56 (19 Sep 2019 13:26) (106/56 - 137/81)  BP(mean): 93 (19 Sep 2019 05:49) (84 - 93)  RR: 17 (19 Sep 2019 13:26) (16 - 17)  SpO2: 97% (19 Sep 2019 13:47) (93% - 99%)    I&O's Summary    18 Sep 2019 07:01  -  19 Sep 2019 07:00  --------------------------------------------------------  IN: 0 mL / OUT: 2301 mL / NET: -2301 mL    19 Sep 2019 07:01  -  19 Sep 2019 14:41  --------------------------------------------------------  IN: 0 mL / OUT: 1200 mL / NET: -1200 mL          PHYSICAL EXAM:  TELE:   Constitutional: NAD, awake and alert, well-developed  HEENT: Moist Mucous Membranes, Anicteric  Pulmonary: Non-labored, breath sounds with crackles Bilaterally diminished at bases  No  wheezes, crackles or rhonchi   Cardiovascular: Regular, S1 and S2 nl, + murmur No rubs, gallops or clicks   Gastrointestinal: Bowel Sounds present, soft, nontender.   Lymph: No lymphadenopathy. No peripheral edema.  Skin: No visible rashes or ulcers.  Psych:  Mood & affect appropriate    LABS: All Labs Reviewed:                        9.3    9.17  )-----------( 273      ( 19 Sep 2019 09:03 )             30.8                         9.1    9.10  )-----------( 293      ( 18 Sep 2019 09:26 )             30.0                         8.7    8.78  )-----------( 302      ( 17 Sep 2019 10:13 )             28.6     19 Sep 2019 09:03    146    |  99     |  43     ----------------------------<  115    3.9     |  44     |  1.40   18 Sep 2019 09:26    145    |  99     |  43     ----------------------------<  104    4.2     |  39     |  1.50   17 Sep 2019 10:14    142    |  96     |  37     ----------------------------<  88     3.3     |  41     |  1.20     Ca    10.1       19 Sep 2019 09:03  Ca    10.2       18 Sep 2019 09:26  Ca    9.5        17 Sep 2019 10:14  Phos  3.2       19 Sep 2019 09:03  Phos  2.3       18 Sep 2019 09:26  Mg     2.0       19 Sep 2019 09:03  Mg     1.9       18 Sep 2019 09:26    TPro  7.1    /  Alb  3.0    /  TBili  0.6    /  DBili  x      /  AST  22     /  ALT  15     /  AlkPhos  38     19 Sep 2019 09:03    ECG:  < from: 12 Lead ECG (09.16.19 @ 08:29) >  Ventricular Rate 65 BPM    Atrial Rate 59 BPM    QRS Duration 184 ms    Q-T Interval 526 ms    QTC Calculation(Bezet) 547 ms    R Axis -51 degrees    T Axis 105 degrees    Diagnosis Line  pvcs    Abnormal ECG  When compared with ECG of 10-SEP-2019 18:07, No significant change was found    < end of copied text >      Echo:  < from: TTE Echo Doppler w/o Cont (09.11.19 @ 17:32) >    Conclusion:   Technically difficult study  Moderate left ventricular systolic dysfunction, estimated LVEF of 35-40%.   Left ventricular hypertrophy and enlargement is noted. Septal motion   consistent with conduction delay  Right ventricle is not well visualized. Devicewire is noted within the   right heart  Left atrial enlargement  Sclerotic trileaflet aortic valve with normal opening. Trace AI.   Moderate to severe MR   Trace TR.    Estimated PA systolic pressure of 49 mmHg. The IVC is dilated  No significant pericardial effusion.        < end of copied text >      Radiology:  < from: Xray Chest 1 View- PORTABLE-Routine (09.14.19 @ 07:24) >    Left multi lead cardiac device.    The cardiac silhouette is enlarged. Pulmonary edema. Small bilateral   pleural effusions.    IMPRESSION: Pulmonary edema and small bilateral pleural effusions with   slight interval improvement [facility] -- Claudio Gilliland, Maryanne, Cuong Sands Patel, Savella  Office # [phone]      Follow Up:    CHF    Subjective/Observations:   No events overnight resting comfortably in bed.  No complaints of chest pain, dyspnea, or palpitations reported. No signs of orthopnea or PND.     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  Ischemic bowel disease  Colon cancer: s/p chemo  Automatic implantable cardioverter-defibrillator in situ  Acute MI: s/p CABG x3  Hypertension  Congestive heart failure  Insomnia  Anemia, vitamin B12 deficiency  HTN (hypertension)  Ischemic colitis, enteritis, or enterocolitis: s/p partial colectomy  Vitamin B 12 deficiency  Prostate cancer: s/p radiation  Afib  CKD (chronic kidney disease)  MI (myocardial infarction)  PAD (peripheral artery disease): s/p stent, fem-fem bypass  CAD (coronary artery disease)  Peripheral Neuropathy  COPD (chronic obstructive pulmonary disease)  Colostomy care  Cataract extraction status of eye, right  S/P colostomy  S/P cardiac pacemaker procedure: AICD  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  S/P small bowel resection  S/P colon resection  S/P cholecystectomy  S/P bypass graft of extremity: Left to right femoral-femoral artery bypass graft 10 years ago  S/P CABG x 3      MEDICATIONS  (STANDING):  ALBUTerol/ipratropium for Nebulization 3 milliLiter(s) Nebulizer every 6 hours  aspirin enteric coated 81 milliGRAM(s) Oral daily  atorvastatin 40 milliGRAM(s) Oral at bedtime  carvedilol 18.75 milliGRAM(s) Oral every 12 hours  fenofibrate Tablet 145 milliGRAM(s) Oral daily  gabapentin 100 milliGRAM(s) Oral two times a day  guaiFENesin  milliGRAM(s) Oral every 12 hours  influenza   Vaccine 0.5 milliLiter(s) IntraMuscular once  mineral oil/petrolatum Hydrophilic Ointment 1 Application(s) Topical daily  potassium chloride    Tablet ER 10 milliEquivalent(s) Oral daily  rivaroxaban 15 milliGRAM(s) Oral with dinner  sacubitril 24 mG/valsartan 26 mG 2 Tablet(s) Oral two times a day    MEDICATIONS  (PRN):  petrolatum Ophthalmic Ointment 1 Application(s) Both EYES two times a day PRN eye dryness      Allergies    No Known Allergies    Intolerances        Vital Signs Last 24 Hrs  T(C): 36.8 (19 Sep 2019 13:26), Max: 36.8 (18 Sep 2019 22:31)  T(F): 98.2 (19 Sep 2019 13:26), Max: 98.2 (18 Sep 2019 22:31)  HR: 60 (19 Sep 2019 13:47) (59 - 64)  BP: 106/56 (19 Sep 2019 13:26) (106/56 - 137/81)  BP(mean): 93 (19 Sep 2019 05:49) (84 - 93)  RR: 17 (19 Sep 2019 13:26) (16 - 17)  SpO2: 97% (19 Sep 2019 13:47) (93% - 99%)    I&O's Summary    18 Sep 2019 07:01  -  19 Sep 2019 07:00  --------------------------------------------------------  IN: 0 mL / OUT: 2301 mL / NET: -2301 mL    19 Sep 2019 07:01  -  19 Sep 2019 14:41  --------------------------------------------------------  IN: 0 mL / OUT: 1200 mL / NET: -1200 mL          PHYSICAL EXAM:  TELE:   Constitutional: NAD, awake    HEENT: Moist Mucous Membranes, Anicteric  Pulmonary: Non-labored, breath sounds with crackles Bilaterally diminished at bases  No  wheezes, crackles or rhonchi   Cardiovascular: Regular, S1 and S2 nl, + murmur No rubs, gallops or clicks   Gastrointestinal: Bowel Sounds present, soft, nontender.   Lymph: No lymphadenopathy. trace peripheral edema.  Skin: No visible rashes or ulcers.  Psych:  Mood & affect appropriate    LABS: All Labs Reviewed:                        9.3    9.17  )-----------( 273      ( 19 Sep 2019 09:03 )             30.8                         9.1    9.10  )-----------( 293      ( 18 Sep 2019 09:26 )             30.0                         8.7    8.78  )-----------( 302      ( 17 Sep 2019 10:13 )             28.6     19 Sep 2019 09:03    146    |  99     |  43     ----------------------------<  115    3.9     |  44     |  1.40   18 Sep 2019 09:26    145    |  99     |  43     ----------------------------<  104    4.2     |  39     |  1.50   17 Sep 2019 10:14    142    |  96     |  37     ----------------------------<  88     3.3     |  41     |  1.20     Ca    10.1       19 Sep 2019 09:03  Ca    10.2       18 Sep 2019 09:26  Ca    9.5        17 Sep 2019 10:14  Phos  3.2       19 Sep 2019 09:03  Phos  2.3       18 Sep 2019 09:26  Mg     2.0       19 Sep 2019 09:03  Mg     1.9       18 Sep 2019 09:26    TPro  7.1    /  Alb  3.0    /  TBili  0.6    /  DBili  x      /  AST  22     /  ALT  15     /  AlkPhos  38     19 Sep 2019 09:03    ECG:  < from: 12 Lead ECG (09.16.19 @ 08:29) >  Ventricular Rate 65 BPM    Atrial Rate 59 BPM    QRS Duration 184 ms    Q-T Interval 526 ms    QTC Calculation(Bezet) 547 ms    R Axis -51 degrees    T Axis 105 degrees    Diagnosis Line  pvcs    Abnormal ECG  When compared with ECG of 10-SEP-2019 18:07, No significant change was found    < end of copied text >      Echo:  < from: TTE Echo Doppler w/o Cont (09.11.19 @ 17:32) >    Conclusion:   Technically difficult study  Moderate left ventricular systolic dysfunction, estimated LVEF of 35-40%.   Left ventricular hypertrophy and enlargement is noted. Septal motion   consistent with conduction delay  Right ventricle is not well visualized. Devicewire is noted within the   right heart  Left atrial enlargement  Sclerotic trileaflet aortic valve with normal opening. Trace AI.   Moderate to severe MR   Trace TR.    Estimated PA systolic pressure of 49 mmHg. The IVC is dilated  No significant pericardial effusion.        < end of copied text >      Radiology:  < from: Xray Chest 1 View- PORTABLE-Routine (09.14.19 @ 07:24) >    Left multi lead cardiac device.    The cardiac silhouette is enlarged. Pulmonary edema. Small bilateral   pleural effusions.    IMPRESSION: Pulmonary edema and small bilateral pleural effusions with   slight interval improvement

## 2019-09-19 NOTE — PROGRESS NOTE ADULT - PROBLEM SELECTOR PLAN 4
-6/13/2018 rodolfo showed mod obstructive pattern   -Continue duoneb under control  -O2 support  -BiPAP QHS as per pulm (recs appreciated)

## 2019-09-20 VITALS — OXYGEN SATURATION: 97 %

## 2019-09-20 DIAGNOSIS — R54 AGE-RELATED PHYSICAL DEBILITY: ICD-10-CM

## 2019-09-20 LAB
ALBUMIN SERPL ELPH-MCNC: 2.7 G/DL — LOW (ref 3.3–5)
ALP SERPL-CCNC: 37 U/L — LOW (ref 40–120)
ALT FLD-CCNC: 11 U/L — LOW (ref 12–78)
ANION GAP SERPL CALC-SCNC: 6 MMOL/L — SIGNIFICANT CHANGE UP (ref 5–17)
AST SERPL-CCNC: 15 U/L — SIGNIFICANT CHANGE UP (ref 15–37)
BILIRUB SERPL-MCNC: 0.7 MG/DL — SIGNIFICANT CHANGE UP (ref 0.2–1.2)
BUN SERPL-MCNC: 51 MG/DL — HIGH (ref 7–23)
CALCIUM SERPL-MCNC: 10.3 MG/DL — HIGH (ref 8.5–10.1)
CHLORIDE SERPL-SCNC: 100 MMOL/L — SIGNIFICANT CHANGE UP (ref 96–108)
CO2 SERPL-SCNC: 43 MMOL/L — HIGH (ref 22–31)
CREAT SERPL-MCNC: 1.4 MG/DL — HIGH (ref 0.5–1.3)
GLUCOSE SERPL-MCNC: 138 MG/DL — HIGH (ref 70–99)
HCT VFR BLD CALC: 29.1 % — LOW (ref 39–50)
HGB BLD-MCNC: 8.6 G/DL — LOW (ref 13–17)
MAGNESIUM SERPL-MCNC: 2.1 MG/DL — SIGNIFICANT CHANGE UP (ref 1.6–2.6)
MCHC RBC-ENTMCNC: 29.6 GM/DL — LOW (ref 32–36)
MCHC RBC-ENTMCNC: 29.7 PG — SIGNIFICANT CHANGE UP (ref 27–34)
MCV RBC AUTO: 100.3 FL — HIGH (ref 80–100)
NRBC # BLD: 0 /100 WBCS — SIGNIFICANT CHANGE UP (ref 0–0)
PHOSPHATE SERPL-MCNC: 2.7 MG/DL — SIGNIFICANT CHANGE UP (ref 2.5–4.5)
PLATELET # BLD AUTO: 246 K/UL — SIGNIFICANT CHANGE UP (ref 150–400)
POTASSIUM SERPL-MCNC: 3.7 MMOL/L — SIGNIFICANT CHANGE UP (ref 3.5–5.3)
POTASSIUM SERPL-SCNC: 3.7 MMOL/L — SIGNIFICANT CHANGE UP (ref 3.5–5.3)
PROT SERPL-MCNC: 6.6 G/DL — SIGNIFICANT CHANGE UP (ref 6–8.3)
RBC # BLD: 2.9 M/UL — LOW (ref 4.2–5.8)
RBC # FLD: 15.7 % — HIGH (ref 10.3–14.5)
SODIUM SERPL-SCNC: 149 MMOL/L — HIGH (ref 135–145)
WBC # BLD: 11.66 K/UL — HIGH (ref 3.8–10.5)
WBC # FLD AUTO: 11.66 K/UL — HIGH (ref 3.8–10.5)

## 2019-09-20 PROCEDURE — 99497 ADVNCD CARE PLAN 30 MIN: CPT

## 2019-09-20 PROCEDURE — 71045 X-RAY EXAM CHEST 1 VIEW: CPT | Mod: 26

## 2019-09-20 PROCEDURE — 99291 CRITICAL CARE FIRST HOUR: CPT

## 2019-09-20 PROCEDURE — 99233 SBSQ HOSP IP/OBS HIGH 50: CPT

## 2019-09-20 RX ORDER — MEROPENEM 1 G/30ML
500 INJECTION INTRAVENOUS EVERY 12 HOURS
Refills: 0 | Status: DISCONTINUED | OUTPATIENT
Start: 2019-09-20 | End: 2019-09-20

## 2019-09-20 RX ORDER — HYDROMORPHONE HYDROCHLORIDE 2 MG/ML
1 INJECTION INTRAMUSCULAR; INTRAVENOUS; SUBCUTANEOUS
Refills: 0 | Status: DISCONTINUED | OUTPATIENT
Start: 2019-09-20 | End: 2019-09-20

## 2019-09-20 RX ORDER — MEROPENEM 1 G/30ML
1000 INJECTION INTRAVENOUS ONCE
Refills: 0 | Status: COMPLETED | OUTPATIENT
Start: 2019-09-20 | End: 2019-09-20

## 2019-09-20 RX ORDER — HYDROMORPHONE HYDROCHLORIDE 2 MG/ML
1 INJECTION INTRAMUSCULAR; INTRAVENOUS; SUBCUTANEOUS
Qty: 100 | Refills: 0 | Status: DISCONTINUED | OUTPATIENT
Start: 2019-09-20 | End: 2019-09-20

## 2019-09-20 RX ORDER — ATROPINE SULFATE 1 %
2 DROPS OPHTHALMIC (EYE)
Refills: 0 | Status: DISCONTINUED | OUTPATIENT
Start: 2019-09-20 | End: 2019-09-20

## 2019-09-20 RX ADMIN — CARVEDILOL PHOSPHATE 18.75 MILLIGRAM(S): 80 CAPSULE, EXTENDED RELEASE ORAL at 05:36

## 2019-09-20 RX ADMIN — Medication 3 MILLILITER(S): at 14:07

## 2019-09-20 RX ADMIN — SACUBITRIL AND VALSARTAN 2 TABLET(S): 24; 26 TABLET, FILM COATED ORAL at 05:36

## 2019-09-20 RX ADMIN — Medication 3 MILLILITER(S): at 07:55

## 2019-09-20 RX ADMIN — Medication 600 MILLIGRAM(S): at 05:36

## 2019-09-20 RX ADMIN — HYDROMORPHONE HYDROCHLORIDE 1 MG/HR: 2 INJECTION INTRAMUSCULAR; INTRAVENOUS; SUBCUTANEOUS at 16:56

## 2019-09-20 RX ADMIN — GABAPENTIN 100 MILLIGRAM(S): 400 CAPSULE ORAL at 05:36

## 2019-09-20 NOTE — PROGRESS NOTE ADULT - PROBLEM SELECTOR PROBLEM 8
CKD (chronic kidney disease)

## 2019-09-20 NOTE — CONSULT NOTE ADULT - SUBJECTIVE AND OBJECTIVE BOX
Date/Time Patient Seen:  		  Referring MD:   Data Reviewed	       Patient is a 85y old  Male who presents with a chief complaint of LLE cellulitis, PNA (19 Sep 2019 14:40)      Subjective/HPI  in bed  seen and examined  vs and meds reviewed  labs reviewed  kids at bedside  H and P reviewed  labs reviewed    HISTORY OF PRESENT ILLNESS:    High Risk Travel:  International Travel? No(1)     Preferred Language to Address Healthcare:  · Preferred Language to Address Healthcare	English     Patient Identity:  · Birth Sex	Male     Child Abuse Assessment (patients less than 13 yrs):  Chief Complaint: weakness.    · Chief Complaint: The patient is a 85y Male complaining of weakness.  · HPI Objective Statement: 85 y male presents with left le wound cellulitis, cough, [facility], sustained a left leg wound from wheelchair.  today she noted patient was weak, patient denies chest pain, abdominal pain,  no sob,  states cough is chronic, states he has hx of pna in the past.  wife states patient has not had fever, no vomiting, no recent falls. wife states patient has hx of chronic uti, is on maintenance abx.   PMd Dr Alvarado  · Presenting Symptoms: COUGH, lle wound with cellulitis  · Associated Symptoms: cough  · Negative Findings: no chills, no dizziness, no fever, no nausea, no pain, no vomiting  · Significant Negative Findings: no fever, no pain  · Location: lle, cough  · Radiation: none  · Timing: gradual onset  · Duration: week(s)  · Quality: lle wound, cough  · Severity: MODERATE  · Aggravating Factors: walking  · Relieving Factors: none      History and Physical:   Source of Information	Chart(s), Patient, Spouse/Significant Other, Child  Outpatient Providers	PCP: Mindi Aguilera  Cards: Dr. Moore   Uro: Dr. Rubin   Pulm: Dr. Singh     Language:  · Patient/Family of Limited English Proficiency	No     Patient Identity:  · Birth Sex	Male       History of Present Illness:  Reason for Admission: LLE cellulitis, PNA  History of Present Illness:   84yo M with PMH with CHF (LVEF 30% in Aug 2016) with AICD, CAD, MI s/p CABG x3 (2004), A-fib on Xarelto, prostate CA s/p radiation (2004), colon CA s/p chemo (2004), 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, HTN, HLD, COPD on home O2 (2L at night), iron def anemia s/p 3 transfusions at [facility] in August 2019, CKD3, PAD, B12 def, recurrent UTI (self caths at home), 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 Pt also admits to wheelchair induced injury to the left LE resulting in 10 stiches on 9/4/2019 now pt stating area is cellulitic but it has not been treated outpatient. Per pt, [facility] which resulted in worsening b/l LE edema. Pt also admits to diarrhea x5 days- non bloody, no abdominal pain, nausea or vomiting. Pt denies recent travel or sick contacts. Pt denies dizziness, headache, TRUJILLO, chest pain, palpitations, worsening peripheral neuropathy.          PAST MEDICAL & SURGICAL HISTORY:  Oxygen dependent: wears 2LNC at HS  COPD (chronic obstructive pulmonary disease)  Ischemic bowel disease  Colon cancer: s/p chemo  Automatic implantable cardioverter-defibrillator in situ  Acute MI: s/p CABG x3  Afib  Hypertension  Congestive heart failure  Insomnia  Anemia, vitamin B12 deficiency  CAD (coronary artery disease)  HTN (hypertension)  Ischemic colitis, enteritis, or enterocolitis: s/p partial colectomy  Vitamin B 12 deficiency  Prostate cancer: s/p radiation  Colon cancer  Afib  CKD (chronic kidney disease)  MI (myocardial infarction)  PAD (peripheral artery disease): s/p stent, fem-fem bypass  CAD (coronary artery disease)  Peripheral Neuropathy  HTN (hypertension)  COPD (chronic obstructive pulmonary disease)  Colostomy care  Cataract extraction status of eye, right  S/P colostomy  S/P colon resection  S/P cholecystectomy  S/P colostomy  S/P colon resection  S/P cardiac pacemaker procedure: AICD  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  S/P small bowel resection  S/P colon resection  S/P cholecystectomy  S/P bypass graft of extremity: Left to right femoral-femoral artery bypass graft 10 years ago  S/P CABG x 3        Medication list         MEDICATIONS  (STANDING):  ALBUTerol/ipratropium for Nebulization 3 milliLiter(s) Nebulizer every 6 hours  aspirin enteric coated 81 milliGRAM(s) Oral daily  atorvastatin 40 milliGRAM(s) Oral at bedtime  carvedilol 18.75 milliGRAM(s) Oral every 12 hours  fenofibrate Tablet 145 milliGRAM(s) Oral daily  gabapentin 100 milliGRAM(s) Oral two times a day  guaiFENesin  milliGRAM(s) Oral every 12 hours  influenza   Vaccine 0.5 milliLiter(s) IntraMuscular once  mineral oil/petrolatum Hydrophilic Ointment 1 Application(s) Topical daily  potassium chloride    Tablet ER 10 milliEquivalent(s) Oral daily  rivaroxaban 15 milliGRAM(s) Oral with dinner  sacubitril 24 mG/valsartan 26 mG 2 Tablet(s) Oral two times a day    MEDICATIONS  (PRN):  petrolatum Ophthalmic Ointment 1 Application(s) Both EYES two times a day PRN eye dryness         Vitals log        ICU Vital Signs Last 24 Hrs  T(C): 36.5 (20 Sep 2019 05:08), Max: 36.9 (19 Sep 2019 21:55)  T(F): 97.7 (20 Sep 2019 05:08), Max: 98.4 (19 Sep 2019 21:55)  HR: 78 (20 Sep 2019 07:58) (59 - 79)  BP: 118/62 (20 Sep 2019 05:08) (106/56 - 118/62)  BP(mean): 75 (19 Sep 2019 21:55) (75 - 75)  ABP: --  ABP(mean): --  RR: 16 (20 Sep 2019 05:08) (16 - 17)  SpO2: 90% (20 Sep 2019 07:58) (90% - 99%)           Input and Output:  I&O's Detail    19 Sep 2019 07:01  -  20 Sep 2019 07:00  --------------------------------------------------------  IN:  Total IN: 0 mL    OUT:    Colostomy: 300 mL    Indwelling Catheter - Urethral: 2250 mL  Total OUT: 2550 mL    Total NET: -2550 mL          Lab Data                        8.6    11.66 )-----------( 246      ( 20 Sep 2019 09:21 )             29.1     09-20    149<H>  |  100  |  51<H>  ----------------------------<  138<H>  3.7   |  43<H>  |  1.40<H>    Ca    10.3<H>      20 Sep 2019 08:50  Phos  2.7     09-20  Mg     2.1     09-20    TPro  6.6  /  Alb  2.7<L>  /  TBili  0.7  /  DBili  x   /  AST  15  /  ALT  11<L>  /  AlkPhos  37<L>  09-20    non smoker    has 6 children  retired          Review of Systems	  sob  weak      Objective     Physical Examination    heart s1s2  lung dc BS  abd soft  frail  weak  in chair  family at bedside      Pertinent Lab findings & Imaging      Kelin:  NO   Adequate UO     I&O's Detail    19 Sep 2019 07:01  -  20 Sep 2019 07:00  --------------------------------------------------------  IN:  Total IN: 0 mL    OUT:    Colostomy: 300 mL    Indwelling Catheter - Urethral: 2250 mL  Total OUT: 2550 mL    Total NET: -2550 mL               Discussed with:     Cultures:	        Radiology

## 2019-09-20 NOTE — PROGRESS NOTE ADULT - ASSESSMENT
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    Code Status: family and POA decided for DNR/DNI. Discussed w/ Dr. Singh who agreed Palliative assessment was appropriate.   Dr. Song, palliative consulted; recs appreciated; recommending Hospice versus SNF  Further GOC care discussion today with family given patient worsening respiratory status; They are most concerned with patient comfort, and are leaning towards hospice versus comfort care.  They would like to speak with Hospice about options;  Hospice consult placed. F/u w/ family on decision making. 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    Code Status: family and POA decided for DNR/DNI. Discussed w/ Dr. Singh who agreed Palliative assessment was appropriate.   Dr. Song, palliative consulted; recs appreciated; recommending Hospice versus SNF  Further GOC care discussion today with family given patient worsening respiratory status; They are most concerned with patient comfort, and are leaning towards hospice versus comfort care.  They would like to speak with Hospice about options;  Hospice consult placed. F/u w/ family on decision making.    Hematuria: hematuria w/ sediment in alvarado observed at bedside will change alvarado and obtain UA to r/o UTI. 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    Code Status: family and POA decided for DNR/DNI. Discussed w/ Dr. Singh who agreed Palliative assessment was appropriate.   Dr. Song, palliative consulted; recs appreciated; recommending Hospice versus SNF  Further GOC care discussion today with family given patient worsening respiratory status; They are most concerned with patient comfort, and have opted for comfort measures only. They would like to speak with Hospice about options;  Hospice consult placed. F/u w/ family on decision making.    Hematuria: hematuria w/ sediment in alvarado observed at bedside will change alvarado and give 1 time dose of Meropenem.

## 2019-09-20 NOTE — PROGRESS NOTE ADULT - NSHPATTENDINGPLANDISCUSS_GEN_ALL_CORE
pt and cardio PA about his diuresis.
pt and family and Dr Arreola and palliative nurse about code and his AMS.
pt and Dr seymour about his his cellulitis
pt and Dr Valdez about sob
pt and family about his chf.
pt and nurses about his discharge
pt, pt's family, consultants, nursing, resident
family, nursing and 
pt and family and Dr Morton about his chf.
pt, pt's family, consultants, nursing, resident

## 2019-09-20 NOTE — PROGRESS NOTE ADULT - SUBJECTIVE AND OBJECTIVE BOX
Patient is a 85y old  Male who presents with a chief complaint of LLE cellulitis, PNA (20 Sep 2019 10:17)      INTERVAL HPI/OVERNIGHT EVENTS:  Patient w/ increasing O2 requirements overnight.  Ongoing complaint of dyspnea and cough; denies fever/chills, n/v, pain with urination, abdominal pain.    MEDICATIONS  (STANDING):  ALBUTerol/ipratropium for Nebulization 3 milliLiter(s) Nebulizer every 6 hours  aspirin enteric coated 81 milliGRAM(s) Oral daily  atorvastatin 40 milliGRAM(s) Oral at bedtime  carvedilol 18.75 milliGRAM(s) Oral every 12 hours  fenofibrate Tablet 145 milliGRAM(s) Oral daily  gabapentin 100 milliGRAM(s) Oral two times a day  guaiFENesin  milliGRAM(s) Oral every 12 hours  influenza   Vaccine 0.5 milliLiter(s) IntraMuscular once  mineral oil/petrolatum Hydrophilic Ointment 1 Application(s) Topical daily  potassium chloride    Tablet ER 10 milliEquivalent(s) Oral daily  rivaroxaban 15 milliGRAM(s) Oral with dinner  sacubitril 24 mG/valsartan 26 mG 2 Tablet(s) Oral two times a day    MEDICATIONS  (PRN):  petrolatum Ophthalmic Ointment 1 Application(s) Both EYES two times a day PRN eye dryness      Allergies    No Known Allergies    Intolerances        REVIEW OF SYSTEMS:  CONSTITUTIONAL: No fever or chills  HEENT:  No headache, no sore throat  RESPIRATORY: + cough, wheezing, or shortness of breath  CARDIOVASCULAR: No chest pain, palpitations, or leg swelling  GASTROINTESTINAL: No abd pain, nausea, vomiting, or diarrhea  GENITOURINARY: + hematuria; No dysuria, frequency  NEUROLOGICAL: no focal weakness or dizziness  MUSCULOSKELETAL: no myalgias     Vital Signs Last 24 Hrs  T(C): 36.5 (20 Sep 2019 05:08), Max: 36.9 (19 Sep 2019 21:55)  T(F): 97.7 (20 Sep 2019 05:08), Max: 98.4 (19 Sep 2019 21:55)  HR: 78 (20 Sep 2019 07:58) (59 - 79)  BP: 118/62 (20 Sep 2019 05:08) (106/56 - 118/62)  BP(mean): 75 (19 Sep 2019 21:55) (75 - 75)  RR: 16 (20 Sep 2019 05:08) (16 - 17)  SpO2: 90% (20 Sep 2019 07:58) (90% - 99%)    PHYSICAL EXAM:  GENERAL: frail, elderly; dyspneic w/ speech and at rest  HEENT:  EOMI, moist mucous membranes  CHEST/LUNG:  diffuse wheezes w/ coarse breath sounds throughout; decreased BS at bases bilaterally: accessory muscle use  HEART:  irregularly irregular, S1, S2  ABDOMEN:  BS+, soft, nontender, nondistended: colostomy bag in lace  EXTREMITIES: no edema, cyanosis, or calf tenderness: LLE dressing c/d/i  NERVOUS SYSTEM: AA&Ox3    LABS:                        8.6    11.66 )-----------( 246      ( 20 Sep 2019 09:21 )             29.1     CBC Full  -  ( 20 Sep 2019 09:21 )  WBC Count : 11.66 K/uL  Hemoglobin : 8.6 g/dL  Hematocrit : 29.1 %  Platelet Count - Automated : 246 K/uL  Mean Cell Volume : 100.3 fl  Mean Cell Hemoglobin : 29.7 pg  Mean Cell Hemoglobin Concentration : 29.6 gm/dL  Auto Neutrophil # : x  Auto Lymphocyte # : x  Auto Monocyte # : x  Auto Eosinophil # : x  Auto Basophil # : x  Auto Neutrophil % : x  Auto Lymphocyte % : x  Auto Monocyte % : x  Auto Eosinophil % : x  Auto Basophil % : x    20 Sep 2019 08:50    149    |  100    |  51     ----------------------------<  138    3.7     |  43     |  1.40     Ca    10.3       20 Sep 2019 08:50  Phos  2.7       20 Sep 2019 08:50  Mg     2.1       20 Sep 2019 08:50    TPro  6.6    /  Alb  2.7    /  TBili  0.7    /  DBili  x      /  AST  15     /  ALT  11     /  AlkPhos  37     20 Sep 2019 08:50        CAPILLARY BLOOD GLUCOSE              RADIOLOGY & ADDITIONAL TESTS:    Personally reviewed.     Consultant(s) Notes Reviewed:  [x] YES  [ ] NO    Care Discussed with [x] Consultants  [x] Patient  [ ] Family  [ ]      [ ] Other; RN  DVT ppx

## 2019-09-20 NOTE — PROGRESS NOTE ADULT - PROBLEM SELECTOR PLAN 2
- p/w generalized weakness. likely multifactorial. acute infection-cellulitis- decompensated heart failure and now also deconditioning  - cellulitis of lower extremity, completed course of oral abx;   - elevated WBC today; worsening respiratory status  - will monitor lytes/renal function and volume status closely.   - PT abi rec SIMBA:

## 2019-09-20 NOTE — PROGRESS NOTE ADULT - SUBJECTIVE AND OBJECTIVE BOX
[facility] -- Claudio Gilliland, Shavon Madden Pannella, Patel, Savella  Office # [phone]      Follow Up:    HF  Subjective/Observations:   Unable to provide meaningful information.  PT with inceased work of breathing this AM and placed on bipap + orthopnea.     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  Ischemic bowel disease  Colon cancer: s/p chemo  Automatic implantable cardioverter-defibrillator in situ  Acute MI: s/p CABG x3  Hypertension  Congestive heart failure  Insomnia  Anemia, vitamin B12 deficiency  HTN (hypertension)  Ischemic colitis, enteritis, or enterocolitis: s/p partial colectomy  Vitamin B 12 deficiency  Prostate cancer: s/p radiation  Afib  CKD (chronic kidney disease)  MI (myocardial infarction)  PAD (peripheral artery disease): s/p stent, fem-fem bypass  CAD (coronary artery disease)  Peripheral Neuropathy  COPD (chronic obstructive pulmonary disease)  Colostomy care  Cataract extraction status of eye, right  S/P colostomy  S/P cardiac pacemaker procedure: AICD  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  S/P small bowel resection  S/P colon resection  S/P cholecystectomy  S/P bypass graft of extremity: Left to right femoral-femoral artery bypass graft 10 years ago  S/P CABG x 3      MEDICATIONS  (STANDING):  ALBUTerol/ipratropium for Nebulization 3 milliLiter(s) Nebulizer every 6 hours  aspirin enteric coated 81 milliGRAM(s) Oral daily  atorvastatin 40 milliGRAM(s) Oral at bedtime  carvedilol 18.75 milliGRAM(s) Oral every 12 hours  fenofibrate Tablet 145 milliGRAM(s) Oral daily  gabapentin 100 milliGRAM(s) Oral two times a day  guaiFENesin  milliGRAM(s) Oral every 12 hours  influenza   Vaccine 0.5 milliLiter(s) IntraMuscular once  meropenem  IVPB 1000 milliGRAM(s) IV Intermittent once  mineral oil/petrolatum Hydrophilic Ointment 1 Application(s) Topical daily  potassium chloride    Tablet ER 10 milliEquivalent(s) Oral daily  rivaroxaban 15 milliGRAM(s) Oral with dinner  sacubitril 24 mG/valsartan 26 mG 2 Tablet(s) Oral two times a day    MEDICATIONS  (PRN):  petrolatum Ophthalmic Ointment 1 Application(s) Both EYES two times a day PRN eye dryness      Allergies    No Known Allergies    Intolerances        Vital Signs Last 24 Hrs  T(C): 36.5 (20 Sep 2019 05:08), Max: 36.9 (19 Sep 2019 21:55)  T(F): 97.7 (20 Sep 2019 05:08), Max: 98.4 (19 Sep 2019 21:55)  HR: 78 (20 Sep 2019 07:58) (59 - 79)  BP: 118/62 (20 Sep 2019 05:08) (106/56 - 118/62)  BP(mean): 75 (19 Sep 2019 21:55) (75 - 75)  RR: 16 (20 Sep 2019 05:08) (16 - 17)  SpO2: 90% (20 Sep 2019 07:58) (90% - 99%)    I&O's Summary    19 Sep 2019 07:01  -  20 Sep 2019 07:00  --------------------------------------------------------  IN: 0 mL / OUT: 2550 mL / NET: -2550 mL          PHYSICAL EXAM:  TELE: Afib w/ triplet   Constitutional: NAD,lethargic,  well-developed  HEENT: Moist Mucous Membranes, Anicteric  Pulmonary: Non-labored, breath sounds are clear bilaterally, No wheezing, crackles or rhonchi  Cardiovascular: Irregular, S1 and S2 nl, No murmurs, rubs, gallops or clicks  Gastrointestinal: Bowel Sounds present, soft, nontender.   Lymph: No lymphadenopathy. No peripheral edema.  Skin: No visible rashes or ulcers.  Psych:  Mood & affect appropriate    LABS: All Labs Reviewed:                        8.6    11.66 )-----------( 246      ( 20 Sep 2019 09:21 )             29.1                         9.3    9.17  )-----------( 273      ( 19 Sep 2019 09:03 )             30.8                         9.1    9.10  )-----------( 293      ( 18 Sep 2019 09:26 )             30.0     20 Sep 2019 08:50    149    |  100    |  51     ----------------------------<  138    3.7     |  43     |  1.40   19 Sep 2019 09:03    146    |  99     |  43     ----------------------------<  115    3.9     |  44     |  1.40   18 Sep 2019 09:26    145    |  99     |  43     ----------------------------<  104    4.2     |  39     |  1.50     Ca    10.3       20 Sep 2019 08:50  Ca    10.1       19 Sep 2019 09:03  Ca    10.2       18 Sep 2019 09:26  Phos  2.7       20 Sep 2019 08:50  Phos  3.2       19 Sep 2019 09:03  Phos  2.3       18 Sep 2019 09:26  Mg     2.1       20 Sep 2019 08:50  Mg     2.0       19 Sep 2019 09:03  Mg     1.9       18 Sep 2019 09:26    TPro  6.6    /  Alb  2.7    /  TBili  0.7    /  DBili  x      /  AST  15     /  ALT  11     /  AlkPhos  37     20 Sep 2019 08:50  TPro  7.1    /  Alb  3.0    /  TBili  0.6    /  DBili  x      /  AST  22     /  ALT  15     /  AlkPhos  38     19 Sep 2019 09:03           ECG:  < from: 12 Lead ECG (09.16.19 @ 08:29) >  Ventricular Rate 65 BPM    Atrial Rate 59 BPM    QRS Duration 184 ms    Q-T Interval 526 ms    QTC Calculation(Bezet) 547 ms    R Axis -51 degrees    T Axis 105 degrees    Diagnosis Line  pvcs    Abnormal ECG  When compared with ECG of 10-SEP-2019 18:07, No significant change was found    < end of copied text >      Echo:  < from: TTE Echo Doppler w/o Cont (09.11.19 @ 17:32) >    Conclusion:   Technically difficult study  Moderate left ventricular systolic dysfunction, estimated LVEF of 35-40%.   Left ventricular hypertrophy and enlargement is noted. Septal motion   consistent with conduction delay  Right ventricle is not well visualized. Devicewire is noted within the   right heart  Left atrial enlargement  Sclerotic trileaflet aortic valve with normal opening. Trace AI.   Moderate to severe MR   Trace TR.    Estimated PA systolic pressure of 49 mmHg. The IVC is dilated  No significant pericardial effusion.        < end of copied text >      Radiology:  < from: Xray Chest 1 View-PORTABLE IMMEDIATE (09.20.19 @ 11:04) >  EXAM:  XR CHEST PORTABLE IMMED 1V                            PROCEDURE DATE:  09/20/2019          INTERPRETATION:  CHF exacerbation.    AP chest. Prior 9/18/2019.    Low lung volumes. Patient's chin obscures portions both apices.  No change heart mediastinum. Bilateral alveolar interstitial process   could all be related to CHF. Difficult to exclude concomitant infection.   There is slight interval improvement on the left. No significant change   bilateral pleural effusions. Left ICD reidentified in position.    Impression: As above                IRIS MOREAU M.D., ATTENDING RADIOLOGIST  This document has been electronically signed. Sep 20 2019 11:11AM        < end of copied text >

## 2019-09-20 NOTE — PROGRESS NOTE ADULT - PROBLEM SELECTOR PLAN 10
DVT ppx: Continue home Xarelto  Fall precautions    11. Pulmonary nodule:  - new RUL nodule   - pt will f/up with pulm Dr. Singh, as outpt and likely need CT surgery eval as outpt   -pulm recs appreciated    12. Chronic urinary retention:   -Alvarado inserted this admission; hematuria today, maybe due to alvarado trauma  -monitor H&H (stable from yesterday), continue A/C for now  -consider adjustments to a/c and possible urology consult if hematuria persists  - becoming lighter / tea-colored during the course of the day    13. EKG abnormalities:   - LBBB noted on EKG. not on prior EKG 2018; no other signs of acute ischemia  - no active CP, SOB   - CE negative x 3  - cardio recs appreciated

## 2019-09-20 NOTE — CHART NOTE - NSCHARTNOTEFT_GEN_A_CORE
Expiration Note- PGY1    Called by RN because patient appears to no longer be breathing  Assessed patient at bedside.  Patient found to be unresponsive to verbal, physical and painful stimuli. No pulses palpable at radial carotid or femoral arteries. No heart or lung sounds heard on auscultation. No corneal reflex noted.     Time of death: 23:12  Attending Dr. Grey notified  Family at beside notified yes

## 2019-09-20 NOTE — PROGRESS NOTE ADULT - ASSESSMENT
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  We can attempt diamox to assist with further diuresis for the effusions and help reverse to contraction alkalosis.    - CW entresto  - Strict intake and output, 1.2 liter fluid restriction   - Continue with ASA Coreg Lipitor Fenofibrate,  -Pt prognosis is poor      ID LE Cellulitis  -Follow up Blood cultures  - xray can not exclude infection and pt now with leukocytosis  -Antibiotics as per primary team     COPD  Supplemental O2 on bipap  His lung diesase maybe contributing to his overall CO2   nebulizers PRn  Pulmonary following     Anemia  Anemia work up as per primary team  Keep hgb > 8 given CAD history   IF transfusing given split units, lasix in between and frequent lung checks     Afib  Rate controlled on Coreg  cw  Xarelto PO    HLD  Continue with Lipitor    CKD  Baseline cr 1.5  Daily renal profile     Family requesting palliative consultation for CHF and would like to move towards making him comfortable  PT   Fall precautions  Consider Speech and Swallow evaluation       Stefan Miles [facility]  Cardiology   Spectra #[phone]

## 2019-09-20 NOTE — PROGRESS NOTE ADULT - ATTENDING COMMENTS
Patient seen and evaluated at bedside. Agree with findings above. with the additions.    Patient acutely decompensating. now on 5 liters. with active secretions. tea colored urine now with sediment    Patient now DNR/DNI comfort care protocol for hospice.    Care Teams and Family in agreement with plan

## 2019-09-20 NOTE — CHART NOTE - NSCHARTNOTEFT_GEN_A_CORE
Assessment: Pt seen for malnutrition f/u. Pt with poor po intake noted, 25-50% consumed. Currently on O2 mask. Family deciding on  hospice/comfort care.Na+ 149, significant pleural effusions noted, was being diuresed. On 1.2 L fluid restriction. Seen by SLP, on Dys 3 diet with nectar liquids, po supplements added.Sacrum st 1 pressure injury, consider mvi daily if not placed on comfort care.    Factors impacting intake: [ ] none [ ] nausea  [ ] vomiting [ ] diarrhea [ ] constipation  [ ]chewing problems [ ] swallowing issues  [x ] other: resp distress    Diet Presciption: Diet, Dysphagia 3 Soft-Nectar Consistency Fluid:   DASH/TLC {Sodium & Cholesterol Restricted}  No Carb Prosource (1pkg = 15gms Protein)     Qty per Day:  1  Supplement Feeding Modality:  Oral  Ensure Enlive Servings Per Day:  1       Frequency:  Two Times a day (09-18-19 @ 14:17)    Intake: 25-50%    Current Weight:   % Weight Change    Pertinent Medications: MEDICATIONS  (STANDING):  ALBUTerol/ipratropium for Nebulization 3 milliLiter(s) Nebulizer every 6 hours  aspirin enteric coated 81 milliGRAM(s) Oral daily  atorvastatin 40 milliGRAM(s) Oral at bedtime  carvedilol 18.75 milliGRAM(s) Oral every 12 hours  fenofibrate Tablet 145 milliGRAM(s) Oral daily  gabapentin 100 milliGRAM(s) Oral two times a day  guaiFENesin  milliGRAM(s) Oral every 12 hours  influenza   Vaccine 0.5 milliLiter(s) IntraMuscular once  meropenem  IVPB 1000 milliGRAM(s) IV Intermittent once  mineral oil/petrolatum Hydrophilic Ointment 1 Application(s) Topical daily  potassium chloride    Tablet ER 10 milliEquivalent(s) Oral daily  rivaroxaban 15 milliGRAM(s) Oral with dinner  sacubitril 24 mG/valsartan 26 mG 2 Tablet(s) Oral two times a day    MEDICATIONS  (PRN):  petrolatum Ophthalmic Ointment 1 Application(s) Both EYES two times a day PRN eye dryness    Pertinent Labs: 09-20 Na149 mmol/L<H> Glu 138 mg/dL<H> K+ 3.7 mmol/L Cr  1.40 mg/dL<H> BUN 51 mg/dL<H> 09-20 Phos 2.7 mg/dL 09-20 Alb 2.7 g/dL<L> 09-11 [id number] 5.1 %     CAPILLARY BLOOD GLUCOSE        Skin:     Estimated Needs:   [x ] no change since previous assessment  [ ] recalculated:     Previous Nutrition Diagnosis:   [ ] Inadequate Energy Intake [ ]Inadequate Oral Intake [ ] Excessive Energy Intake   [ ] Underweight [ ] Increased Nutrient Needs [ ] Overweight/Obesity   [ ] Altered GI Function [ ] Unintended Weight Loss [ ] Food & Nutrition Related Knowledge Deficit [x ] Malnutrition     Nutrition Diagnosis is [x ] ongoing  [ ] resolved [ ] not applicable     New Nutrition Diagnosis: [ ] not applicable       Interventions:   Recommend  [ ] Change Diet To:  [ ] Nutrition Supplement  [ ] Nutrition Support  [x ] Other:   Continue diet/supplements as tolerated, consider MVI if not placed on comfort care  Monitoring and Evaluation:   [ ] PO intake [ x ] Tolerance to diet prescription [ x ] weights [ x ] labs[ x ] follow up per protocol  [ ] other:

## 2019-09-20 NOTE — PROGRESS NOTE ADULT - SUBJECTIVE AND OBJECTIVE BOX
EMIGDIO REEVES [facility] [phone]   1934 DOA 9/10/2019 DR HOLLY ZIEGLER   ALLERGY     nka    CONTACT     sp Esperanza RODRIGUEZ       Initial evaluation/Pulmonary Critical Care consultation requested on 2019   by Dr Ziegler  from Dr Singh   Patient examined chart reviewed    HOSPITAL ADMISSION   PATIENT CAME  FROM (if information available)        TYPE OF VISIT      Subsequent Pulmonary followup     REASON FOR VISIT  PLEASE SEE PROBLEM LIST/ASSESSMENT AND RECOMMENDATIONS     PATIENT EMIGDIO REEVES [facility] [phone]   1934 DOA 9/10/2019 DR HOLLY ZIEGLER     VITALS/LABS       2019 afeb 64 118/62 16 95%   2019 W 11.6 Hb 8.6 Plt 246 Na 149 K 3.7 CO2 43 Cr 1.4    bnp 24626     REVIEW OF SYMPTOMS     NOTE Noteworthy changes  if any  in ROS and PE are also entered  in note  below      Able to give ROS  Yes     RELIABLE No   CONSTITUTIONAL Weakness Yes  Chills No Vision changes No  ENDOCRINE No unexplained hair loss No heat or cold intolerance    ALLERGY No hives  Sore throat No   RESP Coughing blood no  Shortness of breath YES   NEURO No Headache  Confusion Pain neck No   CARDIAC No Chest pain No Palpitations   GI No Pain abdomen NO   Vomiting NO     PHYSICAL EXAM    HEENT Unremarkable PERRLA atraumatic   RESP Fair air entry EXP prolonged    Harsh breath sound Resp distres mild   CARDIAC S1 S2 No S3     NO JVD    ABDOMEN SOFT BS PRESENT NOT DISTENDED No hepatosplenomegaly PEDAL EDEMA present No calf tenderness  NO rash   GENERAL Not TOXIC looking     PATIENT EMIGDIO REEVES [facility] [phone]   1934 DOA 9/10/2019 DR HOLLY ZIEGLER                             PATIENT DATA    ALLERGY   nka                             HEAD OF BED ELEVATION Yes   DVT PROPHYLAXIS rivaroca()   DIET     dash ()

## 2019-09-20 NOTE — PROGRESS NOTE ADULT - PROVIDER SPECIALTY LIST ADULT
Cardiology
Hospitalist
Pulmonology
Cardiology
Hospitalist

## 2019-09-20 NOTE — PROGRESS NOTE ADULT - PROBLEM SELECTOR PLAN 1
-Acute on chronic systolic CHF exacerbation; respiratory distress: continues to have good output  -last BNP 44148, was improving, with worsening respiratory status overnight; -- pt has reportedly lost almost 20kg in the week he has been diuresed as inpt  - restarted pt's home Entresto for diuresis maintenance; patient status worsened overnight; increasing O2 requirements  - STAT CXR shows ongoing pleural effusions, cannot excluded infectious process; patient placed back on BiPaP given dyspnea  -echo shows stable EF at 30-35%  - Cardio, consulted recs appreciated:   -Strict I&Os, daily weights, HOB elevated, negative balance >14 liters

## 2019-09-20 NOTE — PROGRESS NOTE ADULT - PROBLEM SELECTOR PROBLEM 5
Anemia, vitamin B12 deficiency

## 2019-09-20 NOTE — PROGRESS NOTE ADULT - ASSESSMENT
PATIENT EMIGDIO REEVES [facility] [phone]   1934 DOA 9/10/2019 DR HOLLY LEE                            PULMONARY/CRITICAL CARE ASSESSMENT/RECOMMENDATIONS              85 m 1 ppd smoker quit age 40 retd [facility]  Has home O2  PMH Colon CA 2004 colostomy  (isch colitis AICD CABG A fib Prostate ca Urine retn Self catheterizes PVD Angioplasty R SFA 2019 New 9 mm nodule rul spiculated was admitted with cough sob and was managed for s chf ef 35 copd fev1% 56  cellulitis zosyn then ceeftin                 RESP GAS EXCHANGE NEED FOR HOME BPAP   Patient seems to have compensated chronic resp acidosis likely from copd (was smoker retd )   May use bpap as needed if he has increased work breathing   Monitor pulse ox prn sob and adjust O2 to keep po 90-95%  Will benefit from home bpap after discharge If he goes to [facility] wilfredo sparks use bpap at night and duirng day as needed   2019 I have requested sw to try arrange home bpap based on abg 2019 6a bpap .4 740/53/126 After he finishes beth he should have bpap at home   2019 4l 744/62/84 Meets criteria suggested by Boy et al HOT [facility] study LAURA       PLEURAL EFFUSION   Is likely sec to CHF Doubt that it is contributing significantly to dyspnea and even if we drained it it will likely recur as it is probably from his s CHF Will observe at this point      COPD   2018 rodolfo showed mod obstructive pattern   Continue duoneb Under control     LUNG NODULE   Pt has new rul nodule He will need cts eval  He should come see me in office 200 7400 within 2 w after discharge    A fib   On xarelto     S CHF   9/10 cxr chf   echo ef 35% pasp 49 dilated ivc lae   Lasix 60.2 (-) ( Cr 1.6  Started enteresto 2019   Follow with cardio    INFECTION   Cellulitis sp ceftin course    cns blod culture () likely contaminant                       DISPOSITION  CHF meds being adjusted [facility]f in [facility]   COPD Cont BD  Chr resp failure hypercapnic Home bpap if it can be arranged   LUNG NODULE tO see me in 2 w as outpt   DEHYDRATION 2019 Na 149 Monitor serially     TIME SPENT Over 25 minutes aggregate care time spent on encounter; activities included   direct pt care, counseling and/or coordinating care reviewing notes, lab data/ imaging , discussion with multidisciplinary team/ pt /family. Risks, benefits, alternatives  discussed in detail.

## 2019-09-20 NOTE — PROGRESS NOTE ADULT - PROBLEM SELECTOR PLAN 5
-Pt received transfusions x3 08/2019 which resulted in worsening b/l LE edema   -Monitor H/H, type and screen  -Transfuse PRN  - B12 levels WNL

## 2019-09-20 NOTE — CONSULT NOTE ADULT - PROBLEM SELECTOR RECOMMENDATION 9
frail and weak elderly with multiple medical issues -   pt is DNR DNI  pt has 6 children and is  -   discussed with family - goals of care - discussed [facility] and Hospice at home  family is unsure of best next step -   they are considering eval and assessment for [facility] SN[facility] and poss Home Hospice  prognosis is very poor  family meeting suggested with as many siblings as possible  will follow  medical supportive care and regimen in effect
doing well and recommend today as last day of IV abx and can then finish course with: cefuroxime 250mg PO BID with last day 9/16

## 2019-09-21 NOTE — DISCHARGE NOTE FOR THE EXPIRED PATIENT - HOSPITAL COURSE
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 Pt was heavily diuresed with IV lasix and had ~20kg weight loss within a week. Pt also received a week of antibiotics with resolution of cellulitis. Pt became intra-vascularly depleted, but still had worsening pulm edema and acute on chronic hypoxic resp failure likely due to end-stage CHF c/b afib and severe mitral regurgitation. Pt also had chronic resp failure on home O2 from COPD. Pt and his family chose to pursue hospice and comfort care. Pt was placed on dilaudid drip for dyspnea and . 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 Pt was heavily diuresed with IV lasix and had ~20kg weight loss within a week. Pt also received a week of antibiotics with resolution of cellulitis. Pt became intra-vascularly depleted, but still had worsening pulm edema and acute on chronic hypoxic resp failure likely due to end-stage CHF c/b afib and severe mitral regurgitation. Pt also had chronic resp failure on home O2 from COPD. Pt and his family chose to pursue hospice and comfort care. Pt was placed on dilaudid drip for dyspnea and .

## 2019-09-21 NOTE — DISCHARGE NOTE FOR THE EXPIRED PATIENT - SECONDARY DIAGNOSIS.
Acute on chronic systolic (congestive) heart failure COPD (chronic obstructive pulmonary disease) Cellulitis of left lower extremity Afib Stage 3 chronic kidney disease Acute on chronic respiratory failure with hypoxia

## 2019-09-30 DIAGNOSIS — Z79.82 LONG TERM (CURRENT) USE OF ASPIRIN: ICD-10-CM

## 2019-09-30 DIAGNOSIS — E87.2 ACIDOSIS: ICD-10-CM

## 2019-09-30 DIAGNOSIS — Z89.421 ACQUIRED ABSENCE OF OTHER RIGHT TOE(S): ICD-10-CM

## 2019-09-30 DIAGNOSIS — R91.1 SOLITARY PULMONARY NODULE: ICD-10-CM

## 2019-09-30 DIAGNOSIS — I25.10 ATHEROSCLEROTIC HEART DISEASE OF NATIVE CORONARY ARTERY WITHOUT ANGINA PECTORIS: ICD-10-CM

## 2019-09-30 DIAGNOSIS — I47.2 VENTRICULAR TACHYCARDIA: ICD-10-CM

## 2019-09-30 DIAGNOSIS — E44.0 MODERATE PROTEIN-CALORIE MALNUTRITION: ICD-10-CM

## 2019-09-30 DIAGNOSIS — R33.9 RETENTION OF URINE, UNSPECIFIED: ICD-10-CM

## 2019-09-30 DIAGNOSIS — Z95.1 PRESENCE OF AORTOCORONARY BYPASS GRAFT: ICD-10-CM

## 2019-09-30 DIAGNOSIS — E78.5 HYPERLIPIDEMIA, UNSPECIFIED: ICD-10-CM

## 2019-09-30 DIAGNOSIS — Z92.21 PERSONAL HISTORY OF ANTINEOPLASTIC CHEMOTHERAPY: ICD-10-CM

## 2019-09-30 DIAGNOSIS — Z99.81 DEPENDENCE ON SUPPLEMENTAL OXYGEN: ICD-10-CM

## 2019-09-30 DIAGNOSIS — I13.0 HYPERTENSIVE HEART AND CHRONIC KIDNEY DISEASE WITH HEART FAILURE AND STAGE 1 THROUGH STAGE 4 CHRONIC KIDNEY DISEASE, OR UNSPECIFIED CHRONIC KIDNEY DISEASE: ICD-10-CM

## 2019-09-30 DIAGNOSIS — E87.6 HYPOKALEMIA: ICD-10-CM

## 2019-09-30 DIAGNOSIS — I73.9 PERIPHERAL VASCULAR DISEASE, UNSPECIFIED: ICD-10-CM

## 2019-09-30 DIAGNOSIS — R31.9 HEMATURIA, UNSPECIFIED: ICD-10-CM

## 2019-09-30 DIAGNOSIS — Z85.46 PERSONAL HISTORY OF MALIGNANT NEOPLASM OF PROSTATE: ICD-10-CM

## 2019-09-30 DIAGNOSIS — Z95.0 PRESENCE OF CARDIAC PACEMAKER: ICD-10-CM

## 2019-09-30 DIAGNOSIS — L03.116 CELLULITIS OF LEFT LOWER LIMB: ICD-10-CM

## 2019-09-30 DIAGNOSIS — Z85.038 PERSONAL HISTORY OF OTHER MALIGNANT NEOPLASM OF LARGE INTESTINE: ICD-10-CM

## 2019-09-30 DIAGNOSIS — I48.2 CHRONIC ATRIAL FIBRILLATION: ICD-10-CM

## 2019-09-30 DIAGNOSIS — N18.3 CHRONIC KIDNEY DISEASE, STAGE 3 (MODERATE): ICD-10-CM

## 2019-09-30 DIAGNOSIS — Z92.3 PERSONAL HISTORY OF IRRADIATION: ICD-10-CM

## 2019-09-30 DIAGNOSIS — J96.22 ACUTE AND CHRONIC RESPIRATORY FAILURE WITH HYPERCAPNIA: ICD-10-CM

## 2019-09-30 DIAGNOSIS — Z87.891 PERSONAL HISTORY OF NICOTINE DEPENDENCE: ICD-10-CM

## 2019-09-30 DIAGNOSIS — Z79.01 LONG TERM (CURRENT) USE OF ANTICOAGULANTS: ICD-10-CM

## 2019-09-30 DIAGNOSIS — G93.41 METABOLIC ENCEPHALOPATHY: ICD-10-CM

## 2019-09-30 DIAGNOSIS — R07.89 OTHER CHEST PAIN: ICD-10-CM

## 2019-09-30 DIAGNOSIS — G47.00 INSOMNIA, UNSPECIFIED: ICD-10-CM

## 2019-09-30 DIAGNOSIS — G62.9 POLYNEUROPATHY, UNSPECIFIED: ICD-10-CM

## 2019-09-30 DIAGNOSIS — Z66 DO NOT RESUSCITATE: ICD-10-CM

## 2019-09-30 DIAGNOSIS — I25.2 OLD MYOCARDIAL INFARCTION: ICD-10-CM

## 2019-09-30 DIAGNOSIS — D51.9 VITAMIN B12 DEFICIENCY ANEMIA, UNSPECIFIED: ICD-10-CM

## 2019-09-30 DIAGNOSIS — J18.9 PNEUMONIA, UNSPECIFIED ORGANISM: ICD-10-CM

## 2019-09-30 DIAGNOSIS — J44.9 CHRONIC OBSTRUCTIVE PULMONARY DISEASE, UNSPECIFIED: ICD-10-CM

## 2019-09-30 DIAGNOSIS — I50.23 ACUTE ON CHRONIC SYSTOLIC (CONGESTIVE) HEART FAILURE: ICD-10-CM

## 2019-09-30 DIAGNOSIS — R41.0 DISORIENTATION, UNSPECIFIED: ICD-10-CM

## 2019-10-07 ENCOUNTER — APPOINTMENT (OUTPATIENT)
Dept: VASCULAR SURGERY | Facility: CLINIC | Age: 84
End: 2019-10-07

## 2019-10-13 PROCEDURE — 83735 ASSAY OF MAGNESIUM: CPT

## 2019-10-13 PROCEDURE — 83880 ASSAY OF NATRIURETIC PEPTIDE: CPT

## 2019-10-13 PROCEDURE — 82553 CREATINE MB FRACTION: CPT

## 2019-10-13 PROCEDURE — 93005 ELECTROCARDIOGRAM TRACING: CPT

## 2019-10-13 PROCEDURE — 87899 AGENT NOS ASSAY W/OPTIC: CPT

## 2019-10-13 PROCEDURE — 94760 N-INVAS EAR/PLS OXIMETRY 1: CPT

## 2019-10-13 PROCEDURE — 82550 ASSAY OF CK (CPK): CPT

## 2019-10-13 PROCEDURE — 84100 ASSAY OF PHOSPHORUS: CPT

## 2019-10-13 PROCEDURE — 87493 C DIFF AMPLIFIED PROBE: CPT

## 2019-10-13 PROCEDURE — 97110 THERAPEUTIC EXERCISES: CPT

## 2019-10-13 PROCEDURE — 83036 HEMOGLOBIN GLYCOSYLATED A1C: CPT

## 2019-10-13 PROCEDURE — 85027 COMPLETE CBC AUTOMATED: CPT

## 2019-10-13 PROCEDURE — 94640 AIRWAY INHALATION TREATMENT: CPT

## 2019-10-13 PROCEDURE — 80069 RENAL FUNCTION PANEL: CPT

## 2019-10-13 PROCEDURE — 87150 DNA/RNA AMPLIFIED PROBE: CPT

## 2019-10-13 PROCEDURE — 82607 VITAMIN B-12: CPT

## 2019-10-13 PROCEDURE — 86900 BLOOD TYPING SEROLOGIC ABO: CPT

## 2019-10-13 PROCEDURE — 96365 THER/PROPH/DIAG IV INF INIT: CPT

## 2019-10-13 PROCEDURE — 80053 COMPREHEN METABOLIC PANEL: CPT

## 2019-10-13 PROCEDURE — 99221 1ST HOSP IP/OBS SF/LOW 40: CPT

## 2019-10-13 PROCEDURE — 86901 BLOOD TYPING SEROLOGIC RH(D): CPT

## 2019-10-13 PROCEDURE — 94660 CPAP INITIATION&MGMT: CPT

## 2019-10-13 PROCEDURE — 87086 URINE CULTURE/COLONY COUNT: CPT

## 2019-10-13 PROCEDURE — 80048 BASIC METABOLIC PNL TOTAL CA: CPT

## 2019-10-13 PROCEDURE — 97162 PT EVAL MOD COMPLEX 30 MIN: CPT

## 2019-10-13 PROCEDURE — 97530 THERAPEUTIC ACTIVITIES: CPT

## 2019-10-13 PROCEDURE — 99231 SBSQ HOSP IP/OBS SF/LOW 25: CPT

## 2019-10-13 PROCEDURE — 86850 RBC ANTIBODY SCREEN: CPT

## 2019-10-13 PROCEDURE — 82803 BLOOD GASES ANY COMBINATION: CPT

## 2019-10-13 PROCEDURE — 84484 ASSAY OF TROPONIN QUANT: CPT

## 2019-10-13 PROCEDURE — 93306 TTE W/DOPPLER COMPLETE: CPT

## 2019-10-13 PROCEDURE — 99285 EMERGENCY DEPT VISIT HI MDM: CPT | Mod: 25

## 2019-10-13 PROCEDURE — 87040 BLOOD CULTURE FOR BACTERIA: CPT

## 2019-10-13 PROCEDURE — 31720 CLEARANCE OF AIRWAYS: CPT

## 2019-10-13 PROCEDURE — 87186 SC STD MICRODIL/AGAR DIL: CPT

## 2019-10-13 PROCEDURE — 81001 URINALYSIS AUTO W/SCOPE: CPT

## 2019-10-13 PROCEDURE — 71045 X-RAY EXAM CHEST 1 VIEW: CPT

## 2019-10-13 PROCEDURE — 85610 PROTHROMBIN TIME: CPT

## 2019-10-13 PROCEDURE — 36415 COLL VENOUS BLD VENIPUNCTURE: CPT

## 2019-10-13 PROCEDURE — 94668 MNPJ CHEST WALL SBSQ: CPT

## 2019-10-13 PROCEDURE — 83605 ASSAY OF LACTIC ACID: CPT

## 2019-10-13 PROCEDURE — 87449 NOS EACH ORGANISM AG IA: CPT

## 2019-10-13 PROCEDURE — 96367 TX/PROPH/DG ADDL SEQ IV INF: CPT

## 2019-11-14 NOTE — H&P ADULT - CONSTITUTIONAL DETAILS
89F h/o paranoid schizophrenia (managed off medication), cholelithiasis/choledocholithiasis, left hip arthroplasty, gait instability, presents after fall at home and found to have abn distension in setting of increased  alk phos and t bili, a/f eval, pain control and pending PT evaluation:     Problem/Plan - 1:  ·  Problem: Pulmonary embolism .  Plan: Started on NOAC and tolerating well . . Ct chest noted. < from: CT Chest w/ IV Cont (11.06.19 @ 19:58) >  IMPRESSION:   Left lower lobe pulmonary embolus. Has IVC filter also.     < end of copied text >  Pulmonary and cardiology consulted.     TTE pending.      Problem/Plan - 2:  ·  Problem: Hyperbilirubinemia.  Plan: US abdominal noted   CT abdomen/pelvis noted. < from: CT Abdomen and Pelvis w/ IV Cont (11.06.19 @ 19:58) >  ABDOMEN AND PELVIS:    LIVER: Within normal limits.   BILE DUCTS: Minimal biliary dilatation. Plastic CBD stent.  GALLBLADDER: Cholelithiasis and choledocholithiasis.  SPLEEN: Within normal limits.   PANCREAS: Within normal limits.  ADRENALS: Within normal limits.   KIDNEYS/URETERS: Cysts and other lesions too small to characterize.     BLADDER: Within normal limits.   REPRODUCTIVE ORGANS: Within normal limits.     BOWEL: No bowel obstruction.  PERITONEUM: No ascites.   VESSELS:  IVC filter.   RETROPERITONEUM/LYMPH NODES: No lymphadenopathy.     ABDOMINAL WALL: Within normal limits.  BONES: Bilateral hip arthroplasty. Chronic rib fractures. Multiple   thoracolumbar compression fractures. Age indeterminate fracture right   pubic bone anteriorly.    < end of copied text >  GI consult noted . Possible ERCP for stent removal next week so will hold off starting PO AC.   Awaiting ERCP with stent removal.      Problem/Plan - 3:  ·  Problem: Closed nondisplaced fracture of proximal phalanx of middle fingerr and Pelvic fracture .  Plan: s/p splint for left middle finger distal fracture  Ortho helping.      Problem/Plan - 4:  ·  Problem: Schizophrenia, unspecified type.  Plan: stable off medication.      Problem/Plan - 5:  ·  Problem: Protein calorie malnutrition.  Plan: encourage po  nutrition evaluation  b12 and folate normal.      Problem/Plan - 6:  Problem: CKD (chronic kidney disease), stage II. Plan: -renally dose meds and monitor Cr.     Problem/Plan - 7:  ·  Problem: Fall, initial encounter.  Plan: no evidence of syncope  fall precautions  PT evaluation in progress.     Disposition : DC planning . well-developed/well-groomed/no distress/well-nourished

## 2019-11-18 ENCOUNTER — APPOINTMENT (OUTPATIENT)
Dept: VASCULAR SURGERY | Facility: CLINIC | Age: 84
End: 2019-11-18

## 2020-01-01 NOTE — PHYSICAL THERAPY INITIAL EVALUATION ADULT - IMPAIRMENTS CONTRIBUTING IMPAIRED BED MOBILITY, REHAB EVAL
No pits or tags/Acceptable shape position of pinnae/External auditory canal size and shape acceptable impaired balance

## 2020-07-10 NOTE — PATIENT PROFILE ADULT. - NS PRO OT REFERRAL QUES 2 YN
Okay to refill without recent dilantin levels? I called and left a message to return my call to confirm no recent szs.       Thank you   no

## 2020-09-21 NOTE — PROGRESS NOTE ADULT - PROBLEM SELECTOR PLAN 3
Detail Level: Detailed Hide Additional Notes?: No Detail Level: Zone Continue home medications: Xarelto and carvedilol with hold parameters

## 2021-04-16 NOTE — PATIENT PROFILE ADULT. - TOBACCO USE
From: Tricia Ortiz  To: Monie Moore. Mark Evans MD  Sent: 4/16/2021 10:05 AM CDT  Subject: Non-Urgent Medical Question    I have a scope scheduled for next Thursday and am receiving a second Pfizer vaccine next Monday.  Do I need to schedule a Covid Test before Former smoker

## 2021-05-27 NOTE — CONSULT NOTE ADULT - PROBLEM SELECTOR PROBLEM 2
21      Kathie Quinones  [address]  [facility] [address]       To whom it may concern,   This letter is to inform you that Kathie Quinones YOB: 2004 will need medication from 2021- 2021 since patient and family are traveling to [place] on vacation.  Please do not hesitate to contact me with any questions or concerns.    Sincerely,         Diana Lazar MD  [facility]  [facility] [address]  Phone: [phone]  Fax: [phone]              
CHF exacerbation

## 2021-11-30 NOTE — DISCHARGE NOTE ADULT - NSTOBACCOREFERRAL_GEN_A_CS
Patient declined information Mucosal Advancement Flap Text: Given the location of the defect, shape of the defect and the proximity to free margins a mucosal advancement flap was deemed most appropriate. Incisions were made with a 15 blade scalpel in the appropriate fashion along the cutaneous vermilion border and the mucosal lip. The remaining actinically damaged mucosal tissue was excised.  The mucosal advancement flap was then elevated to the gingival sulcus with care taken to preserve the neurovascular structures and advanced into the primary defect. Care was taken to ensure that precise realignment of the vermilion border was achieved.

## 2022-06-20 NOTE — ED PROVIDER NOTE - OBJECTIVE STATEMENT
Patient/Caregiver requests family/friend to interpret. Pt is a 83 yo male who presents to the ED with a cc of bilateral lower ext edema.  PMHx  CHF (LVEF 30% in Aug 2016) with AICD, MI s/p CABG, A-fib on Xarelto, prostate CA s/p radiation (2004), colon CA s/p chemo (2004),  Ischemic colitis with resection with L colostomy, HTN, COPD, Iron def anemia, CKD, PAD, B12 def, on 2LNC at home.  Pt reports that last week he underwent a vascular procedure to his left lower leg where they attempted to stent vessels compromised by pt underlying PVD.  Per pt and wife the procedure was unsuccessful but pt is a poor OR candidate for further intervention secondary to his underlying co-morbid medical conditions.  He goes on to state that this morning he awoke with cold clammy chills and was noted to have intermittent rigors.  He further reports progressive weakness and difficulty ambulating but notes that the weakness is most pronounced in his left lower ext.  He noted that he began to develop redness to his left lower leg yesterday which has progressively worsened.  Denies documented fevers, N/V/D/C, CP, SOB, abd pain.  Denies ext numbness

## 2022-07-07 NOTE — SWALLOW BEDSIDE ASSESSMENT ADULT - ORAL PHASE
Decreased anterior-posterior movement of the bolus/Delayed oral transit time
Pfizer dose 1, 2, and 3

## 2023-04-17 NOTE — PROGRESS NOTE ADULT - ATTENDING COMMENTS
I personally saw and examined the patient in detail.  I have spoken to the above provider regarding the assessment and plan of care.  I reviewed the above assessment and plan of care, and agree.  I have made changes in the body of the note where appropriate. Cimzia Pregnancy And Lactation Text: This medication crosses the placenta but can be considered safe in certain situations. Cimzia may be excreted in breast milk.

## 2023-08-03 NOTE — PROGRESS NOTE ADULT - PROBLEM/PLAN-1
given to family
DISPLAY PLAN FREE TEXT

## 2023-08-15 NOTE — PATIENT PROFILE ADULT. - TYPE OF ADMISSION, PATIENT PROFILE
[facility] Progress Note           Aminata Garcia Patient Status:  Inpatient    1947 MRN [id number]   Location [facility] 8 [facility] Attending Estela Gillette MD   Hosp Day # 4 PCP Osmany Aguilar MD     Subjective:      Patient seen and examined with daughter in law and  at bedside. No CV complaints.        Review of Systems    General: No fever or chills, No loss of appetite.  Jez  RS: No hemoptysis, no shortness of breath  CV: No chest pain, no palpitations  GI: No melena or hematochezia  : No urinary disturbance  Derm: No skin disorders   Heme: No blood dyscrasias.  Remainder of 12 point systems reviewed and negative (or as mentioned in HPI)      Objective:     Medications:  Current Facility-Administered Medications   Medication Dose Route Frequency Provider Last Rate Last Admin   • ferrous sulfate (65 mg Fe per 325 mg) tablet 325 mg  325 mg Oral Daily with breakfast Leonardo Pena,    325 mg at 23 1434   • furosemide (LASIX) tablet 40 mg  40 mg Oral Daily Estrellita Simon MD       • apixaBAN (ELIQUIS) tablet 5 mg  5 mg Oral 2 times per day Estrellita Simon MD       • cholecalciferol (VITAMIN D) tablet 25 mcg  25 mcg Oral Daily Estrellita Simon MD       • metoPROLOL tartrate (LOPRESSOR) tablet 100 mg  100 mg Oral BID Laura Kirkland MBBS   100 mg at 23   • allopurinol (ZYLOPRIM) tablet 100 mg  100 mg Oral QHS Laura Kirkland MBBS   100 mg at 23   • amLODIPine (NORVASC) tablet 5 mg  5 mg Oral Daily Laura Kirkland MBBS   5 mg at 23   • atorvastatin (LIPITOR) tablet 40 mg  40 mg Oral Daily Laura Kirkland MBBS   40 mg at 23   • insulin lispro (ADMELOG,HumaLOG) - Correction Dose   Subcutaneous TID WC Salvador Arredondo MD   2 Units at 23   • insulin lispro (ADMELOG,HumaLOG) - Correction Dose   Subcutaneous Nightly Salvador Arredondo MD       • heparin (porcine) injection 8,700 Units  80 Units/kg (Dosing  Weight) Intravenous Once Qiana Simon MD       • aspirin (ECOTRIN) enteric coated tablet 81 mg  81 mg Oral Daily Qiana Simon MD   81 mg at 08/14/23 0826   • sodium chloride (PF) 0.9 % injection 2 mL  2 mL Intracatheter 2 times per day Qiana Simon MD   2 mL at 08/14/23 2032      Current Facility-Administered Medications   Medication Dose Route Frequency Provider Last Rate Last Admin   • heparin (porcine) 25,000 units/250 mL in dextrose 5 % infusion  1-40 Units/kg/hr (Dosing Weight) Intravenous Continuous Qiana Simon MD 15.3 mL/hr at 08/14/23 2359 14 Units/kg/hr at 08/14/23 2359   • sodium chloride 0.9% infusion   Intravenous Continuous PRN Qiana Simon MD       • sodium chloride 0.9% infusion   Intravenous Continuous PRN Qiana Simon MD          Current Facility-Administered Medications   Medication Dose Route Frequency Provider Last Rate Last Admin   • hydrALAZINE (APRESOLINE) injection 10 mg  10 mg Intravenous Q4H PRN Laura Kirkland MBBS   10 mg at 08/13/23 2012   • acetaminophen (TYLENOL) tablet 650 mg  650 mg Oral Q4H PRN Salvador Arredondo MD   650 mg at 08/12/23 0818   • dextrose 50 % injection 25 g  25 g Intravenous PRN Salvador Arredondo MD       • dextrose 50 % injection 12.5 g  12.5 g Intravenous PRN Salvador Arredondo MD       • glucagon (GLUCAGEN) injection 1 mg  1 mg Intramuscular PRN Salvador Arredondo MD       • dextrose (GLUTOSE) 40 % gel 15 g  15 g Oral PRN Salvador Arredondo MD       • dextrose (GLUTOSE) 40 % gel 30 g  30 g Oral PRN Salvador Arredondo MD       • sodium chloride (NORMAL SALINE) 0.9 % bolus 100-200 mL  100-200 mL Intravenous PRN Albino Zavaleta MD       • sodium chloride (PF) 0.9 % injection 10 mL  10 mL Intracatheter PRN Albino Zavaleta MD       • heparin (porcine) injection 8,700 Units  80 Units/kg (Dosing Weight) Intravenous PRN Qiana Simon MD   8,700 Units at 08/11/23 2300   • heparin (porcine) injection 4,400 Units   40 Units/kg (Dosing Weight) Intravenous PRQiana Hurley MD   4,400 Units at 08/13/23 2141   • sodium chloride 0.9 % flush bag 25 mL  25 mL Intravenous Qiana Raygoza MD       • sodium chloride 0.9% infusion   Intravenous Continuous Qiana Raygoza MD       • sodium chloride 0.9% infusion   Intravenous Continuous Qiana Raygoza MD            Allergies:   ALLERGIES:   Allergen Reactions   • Sulfamethoxazole-Trimethoprim DIARRHEA, Nausea & Vomiting and Other (See Comments)     Admitted 8/11/23 for K 9.1        Physical Exam:  Vital Last Value 24 Hour Range   Temperature 98.2 °F (36.8 °C) (08/15/23 0805) Temp  Min: 98.2 °F (36.8 °C)  Max: 98.8 °F (37.1 °C)   Pulse 63 (08/15/23 0805) Pulse  Min: 60  Max: 80   Respiratory 18 (08/15/23 0805) Resp  Min: 14  Max: 33   Non-Invasive  Blood Pressure (!) 148/69 (08/15/23 0805) BP  Min: 118/48  Max: 148/69   Pulse Oximetry 96 % (08/15/23 0805) SpO2  Min: 94 %  Max: 97 %   Arterial   Blood Pressure (!) 156/56 (08/14/23 1700) Arterial Line BP  Min: 121/48  Max: 156/56     Tele: Sinus HR 50-60s    Intake/Output:     Intake/Output Summary (Last 24 hours) at 8/15/2023 0909  Last data filed at 8/15/2023 0400  Gross per 24 hour   Intake 757.98 ml   Output 350 ml   Net 407.98 ml       Weight    08/11/23 1600 08/11/23 1620 08/11/23 1920 08/15/23 0124   Weight: 109 kg (240 lb 4.8 oz) 109 kg (240 lb 4.8 oz) 109 kg (240 lb 4.8 oz) 107.9 kg (237 lb 14 oz)        GENERAL: No acute distress  HEENT: Normocephalic.  Neck:  Supple neck.  No JVD  Oral mucosa : Pink and moist.    Endocrine: There is no goiter.  CVS: Regular rate and rhythm.  Normal first and second heart tones.  No murmur  Lung fields: Clear to auscultation bilaterally.   GI: Soft. Nontender, nondistended.    Lower extremity: No cyanosis, clubbing or edema.   Peripheral vascular: Both lower extremities are warm and well perfused.    Neuro: Awake and alert.  Nonfocal examination.  Psych: Appropriate mood  and affect  Integumentary: Warm and Dry      Clinical Data:   (PERSONALLY REVIEWED)    Labs    CBC  Recent Labs   Lab 08/15/23  0452 08/14/23  0315 08/13/23  1603   WBC 10.7 10.7 10.6   HCT 30.2* 29.6* 30.7*   HGB 9.8* 9.6* 10.0*    160 159       CMP  Recent Labs   Lab 08/14/23  0315 08/13/23  0252 08/12/23  1817   SODIUM 138 139 138   POTASSIUM 4.2 4.6 5.1   CHLORIDE 107 107 107   CO2 25 26 26   GLUCOSE 121* 109* 152*   BUN 32* 35* 37*   CREATININE 1.84* 2.45* 2.56*   CALCIUM 9.0 9.0 9.1   TOTPROTEIN 6.2* 5.8* 5.7*   ALBUMIN 3.0* 3.0* 3.2*   BILIRUBIN 0.4 0.4 0.3   AST 67* 114* 128*   * 247* 252*   ALKPT 51 46 51       Cardiac Labs  Recent Labs   Lab 08/11/23  1200   HTROPI 11   NTPROB 1,051*       Lipid Panel  No results found    Coags  Recent Labs   Lab 08/15/23  0452 08/14/23  2256 08/14/23  1547 08/11/23  2307 08/11/23  1200   INR  --   --   --   --  1.2   PTT 56* 45* 52*   < > 29    < > = values in this interval not displayed.       ABG  No results found    Imaging    ECG:   Encounter Date: 08/11/23   Electrocardiogram 12-Lead   Result Value    Ventricular Rate EKG/Min (BPM) 88    Atrial Rate (BPM) 88    AZ-Interval (MSEC) 162    QRS-Interval (MSEC) 92    QT-Interval (MSEC) 362    QTc 438    P Axis (Degrees) 33    R Axis (Degrees) -11    T Axis (Degrees) 32    REPORT TEXT      Normal sinus rhythm  Normal ECG  When compared with ECG of  11-AUG-2023 15:59,  Sinus rhythm  has replaced  Wide QRS rhythm  Vent. rate  has increased  BY  45 BPM  Confirmed by ERROL BLAND, [facility] ([id number]) on 8/12/2023 11:43:46 AM          Echocardiogram:  Results for orders placed during the hospital encounter of 08/11/23    TRANSTHORACIC ECHO(TTE) COMPLETE W/ W/O IMAGING AGENT    Narrative  *[facility]*  [address] [address]  [phone]  Transthoracic Echocardiogram (TTE)    Patient: Aminata Garcia      Study Date/Time:      Aug 11 2023 4:45PM  MRN:     [id number]                FIN#:                 [id number]  :     1947             Ht/Wt:                162cm 103kg  Age:     76                     BSA/BMI:              2.2m^2 39.2kg/m^2  Gender:  F                      Baseline BP:          90 / 63  Ordering Physician:      Terry Macedo    Referring Physician:     Terry Macedo    Attending Physician:     Terry Macedo    Diagnostic Physician:    Dheeraj Lambert MD  Sonographer:             JACKIE Rothman    ------------------------------------------------------------------------------  INDICATIONS:   Shortness of breath.    ------------------------------------------------------------------------------  STUDY CONCLUSIONS  SUMMARY:    1. Left ventricle: The cavity size is normal. Wall thickness is normal.  Systolic function is normal. The ejection fraction was measured by biplane  method of disks. The ejection fraction is 58%.  2. Left atrium: The atrium is moderately dilated.  3. Right ventricle: The cavity size is normal. Systolic function is normal.  The estimated peak pressure is 33mm Hg.  IMPRESSIONS:  There is no previous report available for comparison at this  time.    ------------------------------------------------------------------------------  STUDY DATA:   Procedure:  A transthoracic echocardiogram was performed. Image  quality was good.  M-mode, complete 2D, complete spectral Doppler, and color  Doppler.  Study status:  STAT.  Study completion:  There were no  complications.    FINDINGS    BASELINE ECG:   Normal sinus rhythm.  LEFT VENTRICLE:  The cavity size is normal. Wall thickness is normal. Systolic  function is normal. Wall motion is normal; there are no regional wall motion  abnormalities.    The ejection fraction was measured by biplane method of  disks. The ejection fraction is 58%. The tissue Doppler parameters are  abnormal.    AORTIC VALVE:  The annulus is normal. The valve is trileaflet. The leaflets  are normal thickness.  There is no  stenosis.    AORTA:  Ascending aorta: The vessel is normal-sized.    MITRAL VALVE:  The annulus is normal. The leaflets are normal thickness.  There is no evidence for stenosis.   The peak diastolic gradient is 4mm Hg.    LEFT ATRIUM:  The atrium is moderately dilated.    RIGHT VENTRICLE:  The cavity size is normal. Systolic function is normal. The  estimated peak pressure is 33mm Hg.       The RV pressure during systole is  33mm Hg.    PULMONIC VALVE:   The leaflets are normal thickness.  There is no evidence for  stenosis.    TRICUSPID VALVE:  The leaflets are normal thickness.  There is no evidence for  stenosis.   There is mild regurgitation.    RIGHT ATRIUM:  The atrium is normal in size.       The estimated central  venous pressure is 8mm Hg.    PERICARDIUM:  The pericardium is normal in appearance.    ------------------------------------------------------------------------------  Measurements    Left ventricle            Value         Ref        Left atrium                Value          Ref  JAMES, LAX chord        (N) 5.0    cm     3.8 - 5.2  AP dim, ES             (H) 4.5   cm       2.7 - 3.8  ESD, LAX chord        (N) 2.9    cm     2.2 - 3.5  AP dim index, ES       (N) 2.0   cm/m^2   1.5 - 2.3  JAMES/bsa, LAX chord    (N) 2.3    cm/m^2 2.3 - 3.1  Area ES, A4C           (H) 23    cm^2     <=20  ESD/bsa, LAX chord    (N) 1.3    cm/m^2 1.3 - 2.1  Area/bsa ES, A4C           10.43 cm^2/m^2 ---------  PW, ED, LAX           (H) 1.2    cm     0.6 - 0.9  Area ES, A2C               15    cm^2     ---------  JAMES major ax, A4C         6.6    cm     ---------  Area/bsa ES, A2C           6.67  cm^2/m^2 ---------  ESD major ax, A4C         5.1    cm     ---------  Vol, ES, 1-p A4C       (H) 83    ml       22 - 52  FS major axis, A4C        24     %      ---------  Vol/bsa, ES, 1-p A4C   (N) 38    ml/m^2   11 - 40  JAMES/bsa major ax, A4C     3.0    cm/m^2 ---------  Vol, ES, 1-p A2C       (N) 33    ml       22 - 52  ESD/bsa  major ax, A4C     2.3    cm/m^2 ---------  Vol/bsa, ES, 1-p A2C   (N) 15    ml/m^2   13 - 40  DEVI, A4C                  30.7   cm^2   ---------  Vol, ES, 2-p               53    ml       ---------  GIULIANO, A4C                  17.0   cm^2   ---------  Vol/bsa, ES, 2-p       (N) 24    ml/m^2   16 - 34  FAC, A4C                  45     %      ---------  IVS, ED               (H) 1.0    cm     0.6 - 0.9  Mitral valve               Value          Ref  PW, ED                (H) 1.2    cm     0.6 - 0.9  Peak E                     1.02  m/sec    ---------  IVS/PW, ED                0.87          ---------  Peak A                     0.83  m/sec    ---------  EDV                   (H) 125    ml     46 - 106   Decel time                 190   ms       ---------  ESV                   (N) 25     ml     14 - 42    Peak grad, D               4     mm Hg    ---------  EF                    (N) 58     %      54 - 74    Peak E/A ratio             1.2            ---------  SV                        86     ml     ---------  EDV/bsa               (N) 57     ml/m^2 29 - 61    Tricuspid valve            Value          Ref  ESV/bsa               (N) 11     ml/m^2 8 - 24     TR peak v              (N) 2.5   m/sec    <=2.8  SV/bsa                    39     ml/m^2 ---------  Peak RV-RA grad, S         25    mm Hg    ---------  SV, 1-p A4C               69     ml     ---------  SV/bsa, 1-p A4C           31     ml/m^2 ---------  Aortic root                Value          Ref  EDV, 2-p              (N) 97     ml     46 - 106   Root diam, ED          (L) 2.5   cm       2.8 - 4.3  ESV, 2-p              (N) 33     ml     14 - 42    S-T junct diam, ED     (N) 2.6   cm       2.0 - 3.2  SV, 2-p                   64     ml     ---------  S-T junct diam/bsa, ED (N) 1.2   cm/m^2   1.1 - 1.9  EDV/bsa, 2-p          (N) 44     ml/m^2 29 - 61  ESV/bsa, 2-p          (N) 15     ml/m^2 8 - 24     Ascending aorta            Value          Ref  SV/bsa, 2-p                29     ml/m^2 ---------  AAo AP diam, ED        (N) 2.5   cm       1.9 - 3.5  E', lat filomena, TDI      (N) 13.7   cm/sec >=10.0     AAo AP diam/bsa, ED    (N) 1.1   cm/m^2   1.0 - 2.2  E/e', lat filomena, TDI    (N) 7             <=13  E', med filomena, TDI      (N) 8.1    cm/sec >=7.0      Pulmonary artery           Value          Ref  E/e', med filomena, TDI        13            ---------  Pressure, S                33    mm Hg    ---------  E', avg, TDI              10.875 cm/sec ---------  E/e', avg, TDI        (N) 9             <=14       Systemic veins             Value          Ref  Estimated CVP              8     mm Hg    ---------  Right ventricle           Value         Ref  JAMES, LAX                  3.4    cm     ---------  Pressure, S               33     mm Hg  ---------  Legend:  (L)  and  (H)  monty values outside specified reference range.    (N)  marks values inside specified reference range.    Prepared and electronically signed by  Dheeraj Lambert MD  08/12/2023 08:13       Cardiac cath:   No results found for this or any previous visit.    Assessment and Plan:      Shock  Bradycardia/idioventricular rhythm   Hyperkalemia-resolved  -Idioventricular rhythm/marked bradycardia caused by hypokalemia on presentation.  Status post emergent dialysis.  - 8/11/23: Serial EKGs showed  idioventricular rhythm with a heart rate in 30s    -8/11/2023 echo showed normal LV/RV systolic function.  LVEF 58%.     -No documented previous history of heart disease.     Chronic HFpEF  Hypertension  -8/11/2023 echo  EF 58% showed normal LV/RV systolic function.  -Home medications: Norasc 5 mg daily,Metoprolol 100 mg twice daily, lisinopril 40 mg once daily, and Lasix 40 mg once daily.    -8/13 lopressor and Norvasc restarted, continue to hold lisinopril 2/2 renal indices   -titrate meds as bp tolerates     HX PE  -on eliquis at home  -IV heparin for now  -resume eliquis prior to discharge    DM  - management per primary    Acute on  chronic kidney disease  Hyperkalemia-resolved.  -8/11/2023 emergent HD.  -low potassium diet    -nephrology following    D/w patient, daughter in law and  and RN    Thank you for allowing us to participate in this patient's care.  Please do not hesitate to call with any questions or concerns.    Hal Gan MSN, APRN  [facility]  08/15/23      Emergent/ED

## 2023-08-31 NOTE — SWALLOW BEDSIDE ASSESSMENT ADULT - DIET PRIOR TO ADMI
Regular solids with thin liquids per patient's wife's report Griseofulvin Counseling:  I discussed with the patient the risks of griseofulvin including but not limited to photosensitivity, cytopenia, liver damage, nausea/vomiting and severe allergy.  The patient understands that this medication is best absorbed when taken with a fatty meal (e.g., ice cream or french fries).

## 2024-02-02 NOTE — ED ADULT NURSE NOTE - TEMPLATE
Return to the emergency department earlier if you develop fevers, if you  see pus coming from the wound, or if you develop redness around the wound that extends  beyond 1 inch from the wound edges as these can be signs of wound infection.  
Orthopedic

## 2024-08-07 NOTE — H&P CARDIOLOGY - TOBACCO USE
Patient discussed at thoracic conference today lead by Dr Vivar.  Patient is 83 yo with HX of COPD, asthma and waxing and waning pulmonary nodules.  Patient had a RUL CT guided BX that was non-diagnostic and patient had pneumothorax post.  Group discusses that nodules remain small and that patient should have 3 month PET scan in follow up.  Radiology discusses T2 compression FX which Mrs Stewart discussed at appointment 8/6.    I have spoken with Mrs Lyman, listed on ROSA.  She is aware of communication and will schedule PET scan at end of October.   
Former smoker
